# Patient Record
Sex: FEMALE | Race: WHITE | NOT HISPANIC OR LATINO | Employment: FULL TIME | URBAN - METROPOLITAN AREA
[De-identification: names, ages, dates, MRNs, and addresses within clinical notes are randomized per-mention and may not be internally consistent; named-entity substitution may affect disease eponyms.]

---

## 2017-02-27 ENCOUNTER — OFFICE VISIT (OUTPATIENT)
Dept: URGENT CARE | Facility: MEDICAL CENTER | Age: 42
End: 2017-02-27
Payer: COMMERCIAL

## 2017-02-27 PROCEDURE — 99203 OFFICE O/P NEW LOW 30 MIN: CPT

## 2017-11-21 ENCOUNTER — OFFICE VISIT (OUTPATIENT)
Dept: URGENT CARE | Facility: MEDICAL CENTER | Age: 42
End: 2017-11-21
Payer: COMMERCIAL

## 2017-11-21 PROCEDURE — 99213 OFFICE O/P EST LOW 20 MIN: CPT

## 2017-11-23 NOTE — PROGRESS NOTES
Assessment    1  Low back pain (724 2) (M54 5)    Plan  Low back pain    · Medrol 4 MG Oral Tablet Therapy Pack (MethylPREDNISolone); TAKE ASDIRECTED ON PACKAGE   · Methocarbamol 500 MG Oral Tablet; Take 1 tablet 4 times daily  Continue ice over the back  She can also use ibuprofen or naproxen after the Medrol Dosepak  I gave her handout on back exercises and discussed that she may benefit from physical therapy as her range of motion is full and she has no nerve signs  Chief Complaint    1  Back Pain  Chief Complaint Free Text Note Form: Known history of spinal stenosis and lumbar disc herniation, pain began yesterday      History of Present Illness  HPI: 51-year-old female complains of low back pain for 2 days  She states she got out of the car and felt pain in her back right in the center  She she denies numbness or tingling  She has some pain radiating to her left lateral thigh  Has had a history of back pain and stenosis before  She was referred to pain management but did not go back  She was referred to physical therapy but did not do it due to her work schedule  Hospital Based Practices Required Assessment:  Pain Assessment  the patient states they have pain  The pain is located in the low back  (on a scale of 0 to 10, the patient rates the pain at 8 )       Active Problems  1  Age 35+ During Pregnancy   2  Ankle pain, left (719 47) (M25 572)   3  Bulging lumbar disc (722 10) (M51 26)   4  Complete spontaneous  without mention of complication (310 08) (M51 4)   5  Contusion of leg, right (924 5) (S80 11XA)   6  Facet arthropathy, lumbosacral (721 3) (M12 88)   7  GERD (gastroesophageal reflux disease) (530 81) (K21 9)   8  Hypoglycemia (251 2) (E16 2)   9  Low back pain (724 2) (M54 5)   10  Lumbar degenerative disc disease (722 52) (M51 36)   11  Lumbar radiculopathy (724 4) (M54 16)   12  Lumbar spinal stenosis (724 02) (M48 061)   13  Normal pregnancy (V22 2) (Z34 90)   14   Oral contraceptive prescribed (V25 01) (Z30 011)   15  Spontaneous , incomplete (634 91) (O03 4)    Past Medical History  1  History of Depression (311) (F32 9)   2  History of Diabetes Mellitus (250 00)   3  History of esophageal reflux (V12 79) (Z87 19)   4  History of hypertension (V12 59) (Z86 79)   5  History of urinary tract infection (V13 02) (Z87 440)   6  Oral contraceptive prescribed (V25 01) (Z30 011)   7  History of Pancreatic B Islet Cell Hyperplasia (251 1)   8  History of Previously Pregnant Including 1  Miscarriage(S)   9  History of Strain of ankle, left (845 00) (S96 912A)   10  History of Summary Of Previous Pregnancies  1  (Total No )    Family History  Mother    1  Family history of Diabetes Mellitus (V18 0)   2  Family history of Hypertension (V17 49)  Father    3  Family history of Hypertension (V17 49)  Maternal Grandmother    4  Family history of Diabetes Mellitus (V18 0)  Paternal Grandmother    11  Family history of Breast Cancer (V16 3)  Paternal Aunt    10  Family history of Breast Cancer (V16 3)  Family History    7  Family history of Neuropathy, generalized    Social History   · Denied: History of Alcohol Use (History)   · Daily caffeine consumption, 1 serving a day   · Denied: History of Former smoker   · Never A Smoker   · No drug use   · Social alcohol use (Z78 9)    Surgical History    1  History of Cholecystectomy   2  History of Gastric Surgery For Morbid Obesity   3  History of Wrist Surgery    Current Meds   1  DULoxetine HCl - 60 MG Oral Capsule Delayed Release Particles; TAKE 1 CAPSULE DAILY; Therapy: [id number] to Recorded   2  Famotidine 40 MG Oral Tablet; TAKE 1 TABLET DAILY; Therapy: [id number] to Recorded   3  Ferrous Sulfate 325 (65 Fe) MG Oral Tablet; TAKE 1 TABLET TWICE DAILY; Therapy: (Recorded:2015) to Recorded   4  Multivitamins TABS; TAKE 1 TABLET DAILY; Therapy: (Recorded:2015) to Recorded   5   Protonix 40 MG Oral Tablet Delayed Release; take 1 tab daily; Therapy: (Recorded:15Jan2015) to Recorded   6  Vitamin B Complex Oral Tablet; TAKE 1 TABLET DAILY; Therapy: (Recorded:15Jan2015) to Recorded   7  Vitamin C 1000 MG Oral Tablet; TAKE 1 TABLET DAILY; Therapy: (Recorded:15Jan2015) to Recorded    Allergies    1  No Known Drug Allergies    Vitals  Signs   Recorded: 21Nov2017 05:34PM   Temperature: 98 4 F  Heart Rate: 111  Respiration: 18  Systolic: 227  Diastolic: 74  Weight: 305 lb   BMI Calculated: 39 53  BSA Calculated: 2 28  O2 Saturation: 100  Pain Scale: 8    Physical Exam   Constitutional  General appearance: No acute distress, well appearing and well nourished  Musculoskeletal  Inspection/palpation of joints, bones, and muscles: Abnormal  -- Lumbar spine tender over the spinous processes and bilateral paravertebral muscles  She has some spasm along the bilateral erectors  She is a full back range of motion but painful extension from bend over  She has no weakness with lower extremity range of motion of the hips knees and ankles  Negative straight leg raise bilaterally  Sensation intact bilateral lower extremities        Signatures   Electronically signed by : Luis Manuel Albrecht, [facility]; Nov 21 2017  6:24PM EST                       (Author)    Electronically signed by : KRYSTINA Amso ; Nov 22 2017  9:22AM EST                       (Co-author)

## 2018-01-10 NOTE — MISCELLANEOUS
Message   Recorded as Task   Date: 2016 09:07 AM, Created By: Denise Ambrocio   Task Name: Call Back   Assigned To: Donna Harp   Regarding Patient: Demetrio Martinez, Status: Active   Comment:    Ting Chavez - 2016 9:07 AM     TASK CREATED  Caller: Self; [phone] (Home)  pt called - she said her period has been off  She has been cramping and discharge  please advise  [phone]   Maddie Ruiz - 2016 9:57 AM     TASK EDITED  pt states this is the first episode she has had a 37 day cycle    disch is intermittent with no odor, no c/o redness or irritation    she will monitor and cb if smpt persist or increase     has yrly sched  2016        Active Problems    1  Age 35+ During Pregnancy   2  Ankle pain, left (719 47) (M25 572)   3  Bulging lumbar disc (722 10) (M51 26)   4  Complete spontaneous  without mention of complication (549 15) (W79 0)   5  Facet arthropathy, lumbosacral (721 3) (M46 97)   6  Hypoglycemia (251 2) (E16 2)   7  Low back pain (724 2) (M54 5)   8  Lumbar degenerative disc disease (722 52) (M51 36)   9  Lumbar radiculopathy (724 4) (M54 16)   10  Lumbar spinal stenosis (724 02) (M48 06)   11  Normal pregnancy (V22 2) (Z34 90)   12  Oral contraceptive prescribed (V25 01) (Z30 011)   13  Spontaneous , incomplete (634 91) (O03 4)   14  Urinary tract infection (599 0) (N39 0)    Current Meds   1  DULoxetine HCl - 60 MG Oral Capsule Delayed Release Particles; TAKE 1 CAPSULE   DAILY; Therapy: [id number] to Recorded   2  Ferrous Sulfate 325 (65 Fe) MG Oral Tablet; TAKE 1 TABLET TWICE DAILY; Therapy: (Recorded:2015) to Recorded   3  Multivitamins TABS; TAKE 1 TABLET DAILY; Therapy: (Recorded:2015) to Recorded   4  Protonix 40 MG Oral Tablet Delayed Release (Pantoprazole Sodium); take 1 tab daily; Therapy: (Recorded:2015) to Recorded   5  Vitamin B Complex Oral Tablet; TAKE 1 TABLET DAILY;    Therapy: (Recorded:2015) to Recorded 6  Vitamin C 1000 MG Oral Tablet; TAKE 1 TABLET DAILY; Therapy: (Recorded:15Jan2015) to Recorded   7  ZyrTEC Allergy 10 MG Oral Capsule; take 1 capsule daily; Therapy: (Recorded:15Jan2015) to Recorded    Allergies    1   No Known Drug Allergies    Signatures   Electronically signed by : Chauncey Booth, ; Apr 1 2016  9:58AM EST                       (Author)

## 2018-01-12 NOTE — PROGRESS NOTES
Assessment   1  Contusion of leg, right (924 5) (S80 11XA)    Discussion/Summary   Discussion Summary:    Motrin as directed  as directed     Medication Side Effects Reviewed: Possible side effects of new medications were reviewed with the patient/guardian today  Understands and agrees with treatment plan: The treatment plan was reviewed with the patient/guardian  The patient/guardian understands and agrees with the treatment plan    Counseling Documentation With Imm: The patient was counseled regarding diagnostic results,-- instructions for management,-- risk factor reductions,-- prognosis,-- patient and family education,-- impressions,-- risks and benefits of treatment options,-- importance of compliance with treatment  Follow Up Instructions: Follow Up with your Primary Care Provider in 1-2 days  If your symptoms worsen, go to the nearest [place] [address] Emergency Department  Chief Complaint   Chief Complaint Free Text Note Form: 2 to 3 weeks ago tripped over dog gate had bruises and lumps on R calf with pain      History of Present Illness   HPI: This is a 61-year-old female complaining of bruise right calf times 2-3 weeks  Patient reports she tripped over a dog gate and fell  Patient is concerned his daughter still Edema from the fall  Patient denies any numbness, tingling, loss of sensation  Patient reports pain to deep palpation  Hospital Based Practices Required Assessment:      Pain Assessment      the patient states they have pain  The pain is located in the R leg  (on a scale of 0 to 10, the patient rates the pain at 6 )      Abuse And Domestic Violence Screen       Yes, the patient is safe at home  -- The patient states no one is hurting them  Depression And Suicide Screen  No, the patient has not had thoughts of hurting themself  No, the patient has not felt depressed in the past 7 days  Prefered Language is  english  Primary Language is  english        Readiness To Learn: Receptive  Barriers To Learning: none  Preferred Learning: verbal      Review of Systems   Focused-Female:      Constitutional: No fever, no chills, feels well, no tiredness, no recent weight gain or loss  ENT: no ear ache, no loss of hearing, no nosebleeds or nasal discharge, no sore throat or hoarseness  Cardiovascular: no complaints of slow or fast heart rate, no chest pain, no palpitations, no leg claudication or lower extremity edema  Respiratory: no complaints of shortness of breath, no wheezing, no dyspnea on exertion, no orthopnea or PND  Breasts: no complaints of breast pain, breast lump or nipple discharge  Gastrointestinal: no complaints of abdominal pain, no constipation, no nausea or diarrhea, no vomiting, no bloody stools  Genitourinary: no complaints of dysuria, no incontinence, no pelvic pain, no dysmenorrhea, no vaginal discharge or abnormal vaginal bleeding  Musculoskeletal: no complaints of arthralgia, no myalgia, no joint swelling or stiffness, no limb pain or swelling-- and-- as noted in HPI  Integumentary: no complaints of skin rash or lesion, no itching or dry skin, no skin wounds  Neurological: no complaints of headache, no confusion, no numbness or tingling, no dizziness or fainting  Active Problems    1  Age 35+ During Pregnancy   2  Ankle pain, left (719 47) (M25 572)   3  Bulging lumbar disc (722 10) (M51 26)   4  Complete spontaneous  without mention of complication (138 71) (B75 9)   5  Facet arthropathy, lumbosacral (721 3) (M46 97)   6  Hypoglycemia (251 2) (E16 2)   7  Lumbar degenerative disc disease (722 52) (M51 36)   8  Lumbar radiculopathy (724 4) (M54 16)   9  Lumbar spinal stenosis (724 02) (M48 061)   10  Normal pregnancy (V22 2) (Z34 90)   11  Oral contraceptive prescribed (V25 01) (Z30 011)   12   Spontaneous , incomplete (634 91) (O03 4)      Urinary tract infection (599 0) (N39 0) Low back pain (724 2) (M54 5)               Past Medical History   1  History of Depression (311) (F32 9)   2  History of Diabetes Mellitus (250 00)   3  History of esophageal reflux (V12 79) (Z87 19)   4  History of hypertension (V12 59) (Z86 79)   5  Oral contraceptive prescribed (V25 01) (Z30 011)   6  History of Pancreatic B Islet Cell Hyperplasia (251 1)   7  History of Previously Pregnant Including 1  Miscarriage(S)   8  History of Strain of ankle, left (845 00) (S96 912A)   9  History of Summary Of Previous Pregnancies  1  (Total No )  Active Problems And Past Medical History Reviewed: The active problems and past medical history were reviewed and updated today  Family History   Mother    1  Family history of Diabetes Mellitus (V18 0)   2  Family history of Hypertension (V17 49)  Father    3  Family history of Hypertension (V17 49)  Maternal Grandmother    4  Family history of Diabetes Mellitus (V18 0)  Paternal Grandmother    11  Family history of Breast Cancer (V16 3)  Paternal Aunt    10  Family history of Breast Cancer (V16 3)  Family History    7  Family history of Neuropathy, generalized  Family History Reviewed: The family history was reviewed and updated today  Social History    · Denied: History of Alcohol Use (History)   · Daily caffeine consumption, 1 serving a day   · Denied: History of Former smoker   · Never A Smoker   · No drug use   · Social alcohol use (Z78 9)  Social History Reviewed: The social history was reviewed and updated today  Surgical History   1  History of Cholecystectomy   2  History of Gastric Surgery For Morbid Obesity   3  History of Wrist Surgery  Surgical History Reviewed: The surgical history was reviewed and updated today  Current Meds    1  DULoxetine HCl - 60 MG Oral Capsule Delayed Release Particles; TAKE 1 CAPSULE     DAILY; Therapy: [id number] to Recorded   2  Famotidine 40 MG Oral Tablet; TAKE 1 TABLET DAILY;      Therapy: [id number] to Recorded   3  Ferrous Sulfate 325 (65 Fe) MG Oral Tablet; TAKE 1 TABLET TWICE DAILY; Therapy: (Recorded:15Jan2015) to Recorded   4  Multivitamins TABS; TAKE 1 TABLET DAILY; Therapy: (Recorded:15Jan2015) to Recorded   5  Protonix 40 MG Oral Tablet Delayed Release; take 1 tab daily; Therapy: (Recorded:15Jan2015) to Recorded   6  Vitamin B Complex Oral Tablet; TAKE 1 TABLET DAILY; Therapy: (Recorded:15Jan2015) to Recorded   7  Vitamin C 1000 MG Oral Tablet; TAKE 1 TABLET DAILY; Therapy: (Recorded:15Jan2015) to Recorded   8  ZyrTEC Allergy 10 MG Oral Capsule; take 1 capsule daily; Therapy: (Recorded:15Jan2015) to Recorded  Medication List Reviewed: The medication list was reviewed and updated today  Allergies   1  No Known Drug Allergies    Vitals   Signs   Recorded: [id number] 04:50PM   Heart Rate: 76  Respiration: 16  Systolic: 876  Diastolic: 84  Weight: 484 lb   BMI Calculated: 39 38  BSA Calculated: 2 28  Pain Scale: 6    Physical Exam        Constitutional      General appearance: No acute distress, well appearing and well nourished  Musculoskeletal      Gait and station: Normal        Digits and nails: Normal without clubbing or cyanosis  Inspection/palpation of joints, bones, and muscles: Abnormal  -- right calf, full range of motion, +5 strength, contusion distal aspect of calf with ecchymosis, edema, negative Homans sign        Signatures    Electronically signed by : DREAD Powell; Hany 10 2018  8:20AM EST                       (Author)     Electronically signed by : KRYSTINA Salgado ; Hany 10 2018 10:48AM EST                       (Co-author)

## 2018-05-23 ENCOUNTER — APPOINTMENT (EMERGENCY)
Dept: RADIOLOGY | Facility: HOSPITAL | Age: 43
End: 2018-05-23
Payer: COMMERCIAL

## 2018-05-23 ENCOUNTER — HOSPITAL ENCOUNTER (EMERGENCY)
Facility: HOSPITAL | Age: 43
Discharge: HOME | End: 2018-05-23
Attending: EMERGENCY MEDICINE
Payer: COMMERCIAL

## 2018-05-23 VITALS
SYSTOLIC BLOOD PRESSURE: 159 MMHG | WEIGHT: 246.91 LBS | BODY MASS INDEX: 37.54 KG/M2 | OXYGEN SATURATION: 96 % | HEART RATE: 90 BPM | RESPIRATION RATE: 20 BRPM | TEMPERATURE: 98 F | DIASTOLIC BLOOD PRESSURE: 93 MMHG

## 2018-05-23 DIAGNOSIS — R07.89 CHEST DISCOMFORT: Primary | ICD-10-CM

## 2018-05-23 DIAGNOSIS — R06.00 DYSPNEA: ICD-10-CM

## 2018-05-23 LAB
DEPRECATED D DIMER PPP: 413 NG/ML (FEU) (ref 0–424)
NT-PROBNP SERPL-MCNC: 34 PG/ML
TROPONIN I SERPL-MCNC: <0.02 NG/ML

## 2018-05-23 PROCEDURE — 85379 FIBRIN DEGRADATION QUANT: CPT | Performed by: EMERGENCY MEDICINE

## 2018-05-23 PROCEDURE — 99285 EMERGENCY DEPT VISIT HI MDM: CPT

## 2018-05-23 PROCEDURE — 71046 X-RAY EXAM CHEST 2 VIEWS: CPT

## 2018-05-23 PROCEDURE — 36415 COLL VENOUS BLD VENIPUNCTURE: CPT | Performed by: EMERGENCY MEDICINE

## 2018-05-23 PROCEDURE — 83880 ASSAY OF NATRIURETIC PEPTIDE: CPT | Performed by: EMERGENCY MEDICINE

## 2018-05-23 PROCEDURE — 93005 ELECTROCARDIOGRAM TRACING: CPT

## 2018-05-23 PROCEDURE — 84484 ASSAY OF TROPONIN QUANT: CPT | Performed by: EMERGENCY MEDICINE

## 2018-05-23 NOTE — DISCHARGE INSTRUCTIONS
Chest Pain   WHAT YOU NEED TO KNOW:   Chest pain can be caused by a range of conditions, from not serious to life-threatening  Chest pain can be a symptom of a digestive problem, such as acid reflux or a stomach ulcer  An anxiety attack or a strong emotion, such as anger, can also cause chest pain  Infection, inflammation, or a fracture in the bones or cartilage in your chest can cause pain or discomfort  Sometimes chest pain or pressure is caused by poor blood flow to your heart (angina)  Chest pain may also be caused by life-threatening conditions such as a heart attack or blood clot in your lungs  DISCHARGE INSTRUCTIONS:   Call 911 if:   · You have any of the following signs of a heart attack:      ¨ Squeezing, pressure, or pain in your chest that lasts longer than 5 minutes or returns    ¨ Discomfort or pain in your back, neck, jaw, stomach, or arm     ¨ Trouble breathing    ¨ Nausea or vomiting    ¨ Lightheadedness or a sudden cold sweat, especially with chest pain or trouble breathing    Seek care immediately if:   · You have chest discomfort that gets worse, even with medicine  · You cough or vomit blood  · Your bowel movements are black or bloody  · You cannot stop vomiting, or it hurts to swallow  Contact your healthcare provider if:   · You have questions or concerns about your condition or care  Medicines:   · Medicines  may be given to treat the cause of your chest pain  Examples include pain medicine, anxiety medicine, or medicines to increase blood flow to your heart  · Do not take certain medicines without asking your healthcare provider first   These include NSAIDs, herbal or vitamin supplements, or hormones (estrogen or progestin)  · Take your medicine as directed  Contact your healthcare provider if you think your medicine is not helping or if you have side effects  Tell him or her if you are allergic to any medicine   Keep a list of the medicines, vitamins, and herbs you take  Include the amounts, and when and why you take them  Bring the list or the pill bottles to follow-up visits  Carry your medicine list with you in case of an emergency  Follow up with your healthcare provider within 72 hours, or as directed: You may need to return for more tests to find the cause of your chest pain  You may be referred to a specialist, such as a cardiologist or gastroenterologist  Write down your questions so you remember to ask them during your visits  Healthy living tips: The following are general healthy guidelines  If your chest pain is caused by a heart problem, your healthcare provider will give you specific guidelines to follow  · Do not smoke  Nicotine and other chemicals in cigarettes and cigars can cause lung and heart damage  Ask your healthcare provider for information if you currently smoke and need help to quit  E-cigarettes or smokeless tobacco still contain nicotine  Talk to your healthcare provider before you use these products  · Eat a variety of healthy, low-fat foods  Healthy foods include fruits, vegetables, whole-grain breads, low-fat dairy products, beans, lean meats, and fish  Ask for more information about a heart healthy diet  · Ask about activity  Your healthcare provider will tell you which activities to limit or avoid  Ask when you can drive, return to work, and have sex  Ask about the best exercise plan for you  · Maintain a healthy weight  Ask your healthcare provider how much you should weigh  Ask him or her to help you create a weight loss plan if you are overweight  © 2017 [address] [employer] Information is for End User's use only and may not be sold, redistributed or otherwise used for commercial purposes  All illustrations and images included in CareNotes® are the copyrighted property of [employer]  or [employer]  The above information is an  only   It is not intended as medical advice for individual conditions or treatments  Talk to your doctor, nurse or pharmacist before following any medical regimen to see if it is safe and effective for you  Dyspnea   WHAT YOU NEED TO KNOW:   Dyspnea is breathing difficulty or discomfort  You may have labored, painful, or shallow breathing  You may feel breathless or short of breath  Dyspnea can occur during rest or with activity  You may have dyspnea for a short time, or it might become chronic  Dyspnea is often a symptom of a disease or condition  DISCHARGE INSTRUCTIONS:   Seek care immediately if:   · Your signs and symptoms are the same or worse within 24 hours of treatment  · You have shaking chills or a fever over 102°F      · You have new pain, pressure, or tightness in your chest      · You have a new or worse cough or wheezing, or you cough up blood  · You feel like you cannot get enough air  · The skin over your ribs or on your neck sinks in when you breathe  · You have a severe headache with vomiting and abdominal pain  · You feel confused or dizzy  Contact your healthcare provider or specialist if:   · You have questions or concerns about your condition or care  Medicines:   · Medicines  may be used to treat the cause of your dyspnea  Medicines may reduce swelling in your airway or decrease extra fluid from around your heart or lungs  Other medicines may be used to decrease anxiety and help you feel calm and relaxed  · Take your medicine as directed  Contact your healthcare provider if you think your medicine is not helping or if you have side effects  Tell him or her if you are allergic to any medicine  Keep a list of the medicines, vitamins, and herbs you take  Include the amounts, and when and why you take them  Bring the list or the pill bottles to follow-up visits  Carry your medicine list with you in case of an emergency  Manage long-term dyspnea:   · Create an action plan    You and your healthcare provider can work together to create a plan for how to handle episodes of dyspnea  The plan can include daily activities, treatment changes, and what to do if you have severe breathing problems  · Lean forward on your elbows when you sit  This helps your lungs expand and may make it easier to breathe  · Use pursed-lip breathing any time you feel short of breath  Breathe in through your nose and then slowly breathe out through your mouth with your lips slightly puckered  It should take you twice as long to breathe out as it did to breathe in  · Do not smoke  Nicotine and other chemicals in cigarettes and cigars can cause lung damage and make it harder to breathe  Ask your healthcare provider for information if you currently smoke and need help to quit  E-cigarettes or smokeless tobacco still contain nicotine  Talk to your healthcare provider before you use these products  · Reach or maintain a healthy weight  Your healthcare provider can help you create a safe weight loss plan if you are overweight  · Exercise as directed  Exercise can help your lungs work more easily  Exercise can also help you lose weight if needed  Try to get at least 30 minutes of exercise most days of the week  Your healthcare provider can help you create an exercise plan that is safe for you  Follow up with your healthcare provider or specialist as directed:  Write down your questions so you remember to ask them during your visits  © 2017 [employer] Information is for End User's use only and may not be sold, redistributed or otherwise used for commercial purposes  All illustrations and images included in CareNotes® are the copyrighted property of [employer]  or Isra Daniels  The above information is an  only  It is not intended as medical advice for individual conditions or treatments   Talk to your doctor, nurse or pharmacist before following any medical regimen to see if it is safe and effective for you

## 2018-05-23 NOTE — ED PROVIDER NOTES
History  Chief Complaint   Patient presents with    Shortness of Breath     Pt started with bilat LE edema, now with SOB and some CP  59-year-old female presents to the emergency department relating that "a month ago began having swelling my arms and legs "  She relates that the swelling would be a bit worse at the end of each day  She relates that she saw her primary care physician on Friday and had blood work which was "okay "  She relates that the only thing abnormal on this was that her red blood cells per high and that a couple of numbers on the CBC were off  She relates that she did have thyroid testing,Lyme testing and rheumatoid panel which were unremarkable  She relates that on Monday afternoon) 2 days ago) she began noticing some shortness of breath  Yesterday she began experiencing some chest pressure  Episodes of this were intermittent that they have become more frequent  The dyspnea has been constant  She notices that the symptoms are worse with activity  She relates that she felt a little bit weird when she was laying flat  She has had a cough over this time though attributes this to allergies  She has noticed some fullness in her ears and nasal congestion  Cough is occasionally productive of white-yellow sputum  She has not appreciated any hemoptysis  She has noted occasional temperature elevation up to 99  Patient denies having felt particularly anxious  She notes that she did contact her PCP hoping to obtain a chest x-ray  She relates that she was directed to the ED for further evaluation  Past medical history significant for GERD and spinal stenosis  She relates that she did have gastric bypass surgery in 8167 and some complications afterwards  She has had anemia and required use of oral iron  She has never required transfusion and on most recent testing was not anemic  No personal history of cardiac, pulmonary or coagulopathy problems    Family history only of hypertension  Patient denies any recent immobilization  She is not on any hormonal medications  5/19 labs reviewed            None       Past Medical History:   Diagnosis Date    GERD (gastroesophageal reflux disease)     Spinal stenosis        Past Surgical History:   Procedure Laterality Date    GASTRIC BYPASS         History reviewed  No pertinent family history  I have reviewed and agree with the history as documented  Social History   Substance Use Topics    Smoking status: Never Smoker    Smokeless tobacco: Never Used    Alcohol use No        Review of Systems   Cardiovascular: Positive for leg swelling (Without discomfort)  All other systems reviewed and are negative  Physical Exam  Physical Exam   Constitutional: She is oriented to person, place, and time  She appears well-developed and well-nourished  HENT:   Head: Normocephalic  Eyes: Conjunctivae and EOM are normal    Cardiovascular: Normal rate and regular rhythm  Pulmonary/Chest: Effort normal and breath sounds normal    Musculoskeletal: Normal range of motion  She exhibits no edema or tenderness  Neurological: She is alert and oriented to person, place, and time  Skin: Skin is warm and dry  Psychiatric: She has a normal mood and affect  Her behavior is normal    Nursing note and vitals reviewed        Vital Signs  ED Triage Vitals [05/23/18 1448]   Temperature Pulse Respirations Blood Pressure SpO2   98 °F (36 7 °C) 103 20 147/71 98 %      Temp Source Heart Rate Source Patient Position - Orthostatic VS BP Location FiO2 (%)   Oral Monitor Sitting Right arm --      Pain Score       7           Vitals:    05/23/18 1448 05/23/18 1530 05/23/18 1615   BP: 147/71 159/93    Pulse: 103 88 90   Patient Position - Orthostatic VS: Sitting Sitting        Visual Acuity      ED Medications  Medications - No data to display    Diagnostic Studies  Results Reviewed     Procedure Component Value Units Date/Time    D-Dimer [[id number]] (Normal) Collected:  05/23/18 1518    Lab Status:  Final result Specimen:  Blood from Arm, Right Updated:  05/23/18 1553     D-Dimer, Quant 413 ng/ml (FEU)     B-type natriuretic peptide [[id number]]  (Normal) Collected:  05/23/18 1518    Lab Status:  Final result Specimen:  Blood from Arm, Right Updated:  05/23/18 1548     NT-proBNP 34 pg/mL     Troponin I [[id number]]  (Normal) Collected:  05/23/18 1518    Lab Status:  Final result Specimen:  Blood from Arm, Right Updated:  05/23/18 1547     Troponin I <0 02 ng/mL     Narrative:         [employer] Chemistry analyzer 99% cutoff is > 0 04 ng/mL in network labs    o cTnI 99% cutoff is useful only when applied to patients in the clinical setting of myocardial ischemia  o cTnI 99% cutoff should be interpreted in the context of clinical history, ECG findings and possibly cardiac imaging to establish correct diagnosis  o cTnI 99% cutoff may be suggestive but clearly not indicative of a coronary event without the clinical setting of myocardial ischemia  XR chest 2 views   ED Interpretation by Aris Silva MD (05/23 1546)   Unremarkable cardiac silhouette  Clear lungs  Final Result by John Rizzo MD (05/23 1548)      No acute cardiopulmonary disease              Workstation performed: [id number]                    Procedures  ECG 12 Lead Documentation  Date/Time: 5/23/2018 3:18 PM  Performed by: Yaima Barba  Authorized by: Yaima Barba     ECG reviewed by me, the ED Provider: yes    Patient location:  ED and bedside  Previous ECG:     Previous ECG:  Unavailable  Rate:     ECG rate:  95    ECG rate assessment: normal    Rhythm:     Rhythm: sinus rhythm    Ectopy:     Ectopy: none    QRS:     QRS axis:  Normal  Conduction:     Conduction: normal    ST segments:     ST segments:  Normal  T waves:     T waves: normal             Phone Contacts  ED Phone Contact    ED Course MDM  Number of Diagnoses or Management Options  Chest discomfort:   Dyspnea:     CritCare Time    Disposition  Final diagnoses:   Chest discomfort   Dyspnea     Time reflects when diagnosis was documented in both MDM as applicable and the Disposition within this note     Time User Action Codes Description Comment    5/23/2018  4:15 PM Namita Capone A Add [R07 89] Chest discomfort     5/23/2018  4:15 PM Namita Capone A Add [R06 00] Dyspnea       ED Disposition     ED Disposition Condition Comment    Discharge  Carlin Essex discharge to home/self care  Condition at discharge: Good        Follow-up Information     Follow up With Specialties Details Why [address], [address] Kia Gonzalez Nurse Practitioner Schedule an appointment as soon as possible for a visit  [address] [place] [address]  [phone]            There are no discharge medications for this patient  No discharge procedures on file      ED Provider  Electronically Signed by           Genia Correa MD  05/23/18 Xu Gabriel

## 2018-05-24 LAB
ATRIAL RATE: 95 BPM
P AXIS: 58 DEGREES
PR INTERVAL: 144 MS
QRS AXIS: 66 DEGREES
QRSD INTERVAL: 78 MS
QT INTERVAL: 330 MS
QTC INTERVAL: 414 MS
T WAVE AXIS: 47 DEGREES
VENTRICULAR RATE: 95 BPM

## 2018-05-24 PROCEDURE — 93010 ELECTROCARDIOGRAM REPORT: CPT | Performed by: INTERNAL MEDICINE

## 2018-09-20 ENCOUNTER — OFFICE VISIT (OUTPATIENT)
Dept: URGENT CARE | Facility: CLINIC | Age: 43
End: 2018-09-20
Payer: COMMERCIAL

## 2018-09-20 VITALS
OXYGEN SATURATION: 97 % | HEART RATE: 103 BPM | HEIGHT: 67 IN | SYSTOLIC BLOOD PRESSURE: 118 MMHG | WEIGHT: 258 LBS | DIASTOLIC BLOOD PRESSURE: 70 MMHG | RESPIRATION RATE: 18 BRPM | TEMPERATURE: 98.8 F | BODY MASS INDEX: 40.49 KG/M2

## 2018-09-20 DIAGNOSIS — J20.9 ACUTE BRONCHITIS, UNSPECIFIED ORGANISM: Primary | ICD-10-CM

## 2018-09-20 PROCEDURE — 99213 OFFICE O/P EST LOW 20 MIN: CPT | Performed by: PHYSICIAN ASSISTANT

## 2018-09-20 RX ORDER — CETIRIZINE HYDROCHLORIDE 10 MG/1
TABLET ORAL
COMMUNITY

## 2018-09-20 RX ORDER — ONDANSETRON HYDROCHLORIDE 8 MG/1
TABLET, FILM COATED ORAL
COMMUNITY
End: 2021-09-15 | Stop reason: SDUPTHER

## 2018-09-20 RX ORDER — BUSPIRONE HYDROCHLORIDE 10 MG/1
10 TABLET ORAL 2 TIMES DAILY
COMMUNITY
Start: 2018-06-08 | End: 2020-06-01

## 2018-09-20 RX ORDER — FAMOTIDINE 40 MG/1
40 TABLET, FILM COATED ORAL 2 TIMES DAILY
COMMUNITY
End: 2020-06-01

## 2018-09-20 RX ORDER — BENZONATATE 100 MG/1
100 CAPSULE ORAL 3 TIMES DAILY PRN
Qty: 20 CAPSULE | Refills: 0 | Status: SHIPPED | OUTPATIENT
Start: 2018-09-20 | End: 2020-06-01

## 2018-09-20 RX ORDER — DULOXETIN HYDROCHLORIDE 60 MG/1
60 CAPSULE, DELAYED RELEASE ORAL
COMMUNITY
Start: 2017-12-20 | End: 2020-07-27 | Stop reason: SDUPTHER

## 2018-09-20 RX ORDER — AZITHROMYCIN 250 MG/1
TABLET, FILM COATED ORAL
Qty: 6 TABLET | Refills: 0 | Status: SHIPPED | OUTPATIENT
Start: 2018-09-20 | End: 2018-09-24

## 2018-09-20 NOTE — PROGRESS NOTES
[facility]        NAME: Kang Michel is a 43 y o  female  : 1975    MRN: [id number]  DATE: 2018  TIME: 4:55 PM    Assessment and Plan   Acute bronchitis, unspecified organism [J20 9]  1  Acute bronchitis, unspecified organism  azithromycin (ZITHROMAX) 250 mg tablet    benzonatate (TESSALON PERLES) 100 mg capsule         Patient Instructions     1  Bronchitis  -Take antibiotic as directed with food  -Take tessalon perles as directed  -Tylenol/Motrin  -Increase fluids  -Follow-up with PCP within 3-5 days    Go to ER with worsening symptoms, fever, chest pain, shortness of breath, or any signs of distress    Chief Complaint     Chief Complaint   Patient presents with    Cold Like Symptoms     taking sudafed/cough med   Cough     x 7 days, chest congestion    Headache     sinus headache, head congested    Nasal Congestion     sinus pressure         History of Present Illness       Patient is a 17-year-old female who presents today for evaluation of a cough and cold symptoms that has been present for the past 7 days  Patient feels that her symptoms now moved down into her chest and she is having some chest congestion  Patient admits to nasal congestion and headache as well  Patient states that she tried taking Sudafed and guaifenesin without any relief  Patient denies any sick contacts  Review of Systems   Review of Systems   Constitutional: Negative for chills and fever  HENT: Positive for rhinorrhea  Negative for ear pain and sore throat  Respiratory: Positive for cough  Negative for shortness of breath  Cardiovascular: Negative for chest pain  Neurological: Positive for headaches           Current Medications       Current Outpatient Prescriptions:     cetirizine (ZyrTEC) 10 mg tablet, , Disp: , Rfl:     DULoxetine (CYMBALTA) 60 mg delayed release capsule, Take 60 mg by mouth, Disp: , Rfl:     famotidine (PEPCID) 40 MG tablet, Take 40 mg by mouth 2 (two) times a day, Disp: , Rfl:     azithromycin (ZITHROMAX) 250 mg tablet, Take 2 tablets today then 1 tablet daily x 4 days, Disp: 6 tablet, Rfl: 0    benzonatate (TESSALON PERLES) 100 mg capsule, Take 1 capsule (100 mg total) by mouth 3 (three) times a day as needed for cough, Disp: 20 capsule, Rfl: 0    busPIRone (BUSPAR) 10 mg tablet, Take 10 mg by mouth 2 (two) times a day, Disp: , Rfl:     ondansetron (ZOFRAN) 8 mg tablet, 1 tab(s), Disp: , Rfl:     Current Allergies     Allergies as of 09/20/2018 - Reviewed 09/20/2018   Allergen Reaction Noted    Pollen extract  09/29/2016            The following portions of the patient's history were reviewed and updated as appropriate: allergies, current medications, past family history, past medical history, past social history, past surgical history and problem list      Past Medical History:   Diagnosis Date    GERD (gastroesophageal reflux disease)     Spinal stenosis        Past Surgical History:   Procedure Laterality Date    GASTRIC BYPASS         Family History   Problem Relation Age of Onset    Diabetes Mother     Hypertension Mother     Diabetes Father     Hypertension Father     Heart disease Father          Medications have been verified  Objective   /70   Pulse 103   Temp 98 8 °F (37 1 °C) (Tympanic)   Resp 18   Ht 5' 7" (1 702 m)   Wt 117 kg (258 lb)   LMP 09/09/2018 (Approximate)   SpO2 97%   BMI 40 41 kg/m²        Physical Exam     Physical Exam   Constitutional: She is oriented to person, place, and time  She appears well-developed and well-nourished  No distress  HENT:   Right Ear: Tympanic membrane and external ear normal    Left Ear: Tympanic membrane and external ear normal    Nose: Nose normal  Right sinus exhibits no maxillary sinus tenderness and no frontal sinus tenderness  Left sinus exhibits no maxillary sinus tenderness and no frontal sinus tenderness     Mouth/Throat: Uvula is midline, oropharynx is clear and moist and mucous membranes are normal    Eyes: Conjunctivae and EOM are normal  Pupils are equal, round, and reactive to light  Neck: Normal range of motion  Neck supple  Cardiovascular: Normal rate, regular rhythm and normal heart sounds  Pulmonary/Chest: Effort normal and breath sounds normal  She has no wheezes  She has no rales  Lymphadenopathy:     She has no cervical adenopathy  Neurological: She is alert and oriented to person, place, and time  Skin: Skin is warm and dry  Psychiatric: She has a normal mood and affect  Nursing note and vitals reviewed

## 2018-09-20 NOTE — PATIENT INSTRUCTIONS
1  Bronchitis  -Take antibiotic as directed with food  -Take tessalon perles as directed  -Tylenol/Motrin  -Increase fluids  -Follow-up with PCP within 3-5 days    Go to ER with worsening symptoms, fever, chest pain, shortness of breath, or any signs of distress    Acute Bronchitis   AMBULATORY CARE:   Acute bronchitis  is swelling and irritation in the air passages of your lungs  This irritation may cause you to cough or have other breathing problems  Acute bronchitis often starts because of another illness, such as a cold or the flu  The illness spreads from your nose and throat to your windpipe and airways  Bronchitis is often called a chest cold  Acute bronchitis lasts about 3 to 6 weeks and is usually not a serious illness  Your cough can last for several weeks  You may have any of the following symptoms:   · A cough with sputum that may be clear, yellow, or green    · Feeling more tired than usual, and body aches    · A fever and chills    · Wheezing when you breathe    · A tight chest or pain when you breathe or cough  Seek care immediately if:   · You cough up blood  · Your lips or fingernails turn blue  · You feel like you are not getting enough air when you breathe  Contact your healthcare provider if:   · You have a fever  · Your breathing problems do not go away or get worse  · Your cough does not get better within 4 weeks  · You have questions or concerns about your condition or care  Self-care:   · Get more rest   Rest helps your body to heal  Slowly start to do more each day  Rest when you feel it is needed  · Avoid irritants in the air  Avoid chemicals, fumes, and dust  Wear a face mask if you must work around dust or fumes  Stay inside on days when air pollution levels are high  If you have allergies, stay inside when pollen counts are high  Do not use aerosol products, such as spray-on deodorant, bug spray, and hair spray  · Do not smoke or be around others who smoke  Nicotine and other chemicals in cigarettes and cigars damages the cilia that move mucus out of your lungs  Ask your healthcare provider for information if you currently smoke and need help to quit  E-cigarettes or smokeless tobacco still contain nicotine  Talk to your healthcare provider before you use these products  · Drink liquids as directed  Liquids help keep your air passages moist and help you cough up mucus  You may need to drink more liquids when you have acute bronchitis  Ask how much liquid to drink each day and which liquids are best for you  · Use a humidifier or vaporizer  Use a cool mist humidifier or a vaporizer to increase air moisture in your home  This may make it easier for you to breathe and help decrease your cough  Prevent acute bronchitis by doing the following:   · Get the vaccinations you need  Ask your healthcare provider if you should get vaccinated against the flu or pneumonia  · Prevent the spread of germs  You can decrease your risk of acute bronchitis and other illnesses by doing the following:     [facility] your hands often with soap and water  Carry germ-killing hand lotion or gel with you  You can use the lotion or gel to clean your hands when soap and water are not available  ¨ Do not touch your eyes, nose, or mouth unless you have washed your hands first     ¨ Always cover your mouth when you cough to prevent the spread of germs  It is best to cough into a tissue or your shirt sleeve instead of into your hand  Ask those around you cover their mouths when they cough  ¨ Try to avoid people who have a cold or the flu  If you are sick, stay away from others as much as possible  Medicines: Your healthcare provider may  give you any of the following:  · Ibuprofen or acetaminophen  are medicines that help lower your fever  They are available without a doctor's order  Ask your healthcare provider which medicine is right for you   Ask how much to take and how often to take it  Follow directions  These medicines can cause stomach bleeding if not taken correctly  Ibuprofen can cause kidney damage  Do not take ibuprofen if you have kidney disease, an ulcer, or allergies to aspirin  Acetaminophen can cause liver damage  Do not take more than 4,000 milligrams in 24 hours  · Decongestants  help loosen mucus in your lungs and make it easier to cough up  This can help you breathe easier  · Cough suppressants  decrease your urge to cough  If your cough produces mucus, do not take a cough suppressant unless your healthcare provider tells you to  Your healthcare provider may suggest that you take a cough suppressant at night so you can rest     · Inhalers  may be given  Your healthcare provider may give you one or more inhalers to help you breathe easier and cough less  An inhaler gives your medicine to open your airways  Ask your healthcare provider to show you how to use your inhaler correctly  Follow up with your healthcare provider as directed:  Write down questions you have so you will remember to ask them during your follow-up visits  © 2017 [facility] Information is for End User's use only and may not be sold, redistributed or otherwise used for commercial purposes  All illustrations and images included in CareNotes® are the copyrighted property of A D A M [employer] Inc  or Isra Daniels  The above information is an  only  It is not intended as medical advice for individual conditions or treatments  Talk to your doctor, nurse or pharmacist before following any medical regimen to see if it is safe and effective for you

## 2020-05-18 ENCOUNTER — OFFICE VISIT (OUTPATIENT)
Dept: URGENT CARE | Facility: CLINIC | Age: 45
End: 2020-05-18
Payer: COMMERCIAL

## 2020-05-18 VITALS
WEIGHT: 261.6 LBS | HEART RATE: 86 BPM | TEMPERATURE: 97.5 F | OXYGEN SATURATION: 99 % | SYSTOLIC BLOOD PRESSURE: 137 MMHG | RESPIRATION RATE: 18 BRPM | DIASTOLIC BLOOD PRESSURE: 71 MMHG | HEIGHT: 67 IN | BODY MASS INDEX: 41.06 KG/M2

## 2020-05-18 DIAGNOSIS — L03.113 CELLULITIS OF RIGHT UPPER EXTREMITY: Primary | ICD-10-CM

## 2020-05-18 PROCEDURE — G0382 LEV 3 HOSP TYPE B ED VISIT: HCPCS | Performed by: NURSE PRACTITIONER

## 2020-05-18 RX ORDER — PANTOPRAZOLE SODIUM 40 MG/1
40 TABLET, DELAYED RELEASE ORAL DAILY
COMMUNITY
End: 2020-10-06 | Stop reason: SDUPTHER

## 2020-05-18 RX ORDER — CEPHALEXIN 500 MG/1
500 CAPSULE ORAL EVERY 6 HOURS SCHEDULED
Qty: 20 CAPSULE | Refills: 0 | Status: SHIPPED | OUTPATIENT
Start: 2020-05-18 | End: 2020-05-23

## 2020-06-01 ENCOUNTER — OFFICE VISIT (OUTPATIENT)
Dept: FAMILY MEDICINE CLINIC | Facility: CLINIC | Age: 45
End: 2020-06-01
Payer: COMMERCIAL

## 2020-06-01 VITALS
TEMPERATURE: 97.6 F | OXYGEN SATURATION: 99 % | DIASTOLIC BLOOD PRESSURE: 72 MMHG | HEART RATE: 76 BPM | BODY MASS INDEX: 40.81 KG/M2 | SYSTOLIC BLOOD PRESSURE: 118 MMHG | HEIGHT: 67 IN | RESPIRATION RATE: 18 BRPM | WEIGHT: 260 LBS

## 2020-06-01 DIAGNOSIS — Z00.00 ANNUAL PHYSICAL EXAM: Primary | ICD-10-CM

## 2020-06-01 DIAGNOSIS — Z12.39 SCREENING FOR BREAST CANCER: ICD-10-CM

## 2020-06-01 DIAGNOSIS — K21.9 GASTROESOPHAGEAL REFLUX DISEASE, ESOPHAGITIS PRESENCE NOT SPECIFIED: ICD-10-CM

## 2020-06-01 DIAGNOSIS — E66.01 CLASS 3 SEVERE OBESITY DUE TO EXCESS CALORIES WITH SERIOUS COMORBIDITY AND BODY MASS INDEX (BMI) OF 40.0 TO 44.9 IN ADULT (HCC): ICD-10-CM

## 2020-06-01 DIAGNOSIS — M79.89 LEG SWELLING: ICD-10-CM

## 2020-06-01 DIAGNOSIS — I10 ESSENTIAL HYPERTENSION: ICD-10-CM

## 2020-06-01 DIAGNOSIS — M48.061 SPINAL STENOSIS OF LUMBAR REGION WITHOUT NEUROGENIC CLAUDICATION: ICD-10-CM

## 2020-06-01 DIAGNOSIS — Z98.84 HISTORY OF ROUX-EN-Y GASTRIC BYPASS: ICD-10-CM

## 2020-06-01 DIAGNOSIS — R73.03 PREDIABETES: ICD-10-CM

## 2020-06-01 DIAGNOSIS — R06.00 DYSPNEA ON EXERTION: ICD-10-CM

## 2020-06-01 PROBLEM — R06.09 DYSPNEA ON EXERTION: Status: ACTIVE | Noted: 2020-06-01

## 2020-06-01 PROBLEM — E66.813 CLASS 3 SEVERE OBESITY DUE TO EXCESS CALORIES WITH SERIOUS COMORBIDITY AND BODY MASS INDEX (BMI) OF 40.0 TO 44.9 IN ADULT (HCC): Status: ACTIVE | Noted: 2020-06-01

## 2020-06-01 PROCEDURE — 99386 PREV VISIT NEW AGE 40-64: CPT | Performed by: INTERNAL MEDICINE

## 2020-06-01 PROCEDURE — 3074F SYST BP LT 130 MM HG: CPT | Performed by: INTERNAL MEDICINE

## 2020-06-01 PROCEDURE — 3008F BODY MASS INDEX DOCD: CPT | Performed by: INTERNAL MEDICINE

## 2020-06-01 PROCEDURE — 3078F DIAST BP <80 MM HG: CPT | Performed by: INTERNAL MEDICINE

## 2020-06-01 PROCEDURE — 99215 OFFICE O/P EST HI 40 MIN: CPT | Performed by: INTERNAL MEDICINE

## 2020-06-01 PROCEDURE — 1036F TOBACCO NON-USER: CPT | Performed by: INTERNAL MEDICINE

## 2020-06-01 RX ORDER — METOPROLOL SUCCINATE 25 MG/1
25 TABLET, EXTENDED RELEASE ORAL DAILY
COMMUNITY
End: 2020-06-01 | Stop reason: SDUPTHER

## 2020-06-01 RX ORDER — METOPROLOL SUCCINATE 25 MG/1
25 TABLET, EXTENDED RELEASE ORAL DAILY
Qty: 90 TABLET | Refills: 1 | Status: SHIPPED | OUTPATIENT
Start: 2020-06-01 | End: 2020-12-03 | Stop reason: SDUPTHER

## 2020-06-05 ENCOUNTER — LAB (OUTPATIENT)
Dept: LAB | Facility: CLINIC | Age: 45
End: 2020-06-05
Payer: COMMERCIAL

## 2020-06-05 DIAGNOSIS — R73.03 PREDIABETES: ICD-10-CM

## 2020-06-05 DIAGNOSIS — D50.9 IRON DEFICIENCY ANEMIA, UNSPECIFIED IRON DEFICIENCY ANEMIA TYPE: ICD-10-CM

## 2020-06-05 DIAGNOSIS — E55.9 VITAMIN D DEFICIENCY: ICD-10-CM

## 2020-06-05 DIAGNOSIS — I10 ESSENTIAL HYPERTENSION: ICD-10-CM

## 2020-06-05 DIAGNOSIS — Z98.84 HISTORY OF ROUX-EN-Y GASTRIC BYPASS: ICD-10-CM

## 2020-06-05 DIAGNOSIS — R06.00 DYSPNEA ON EXERTION: ICD-10-CM

## 2020-06-05 DIAGNOSIS — E66.01 CLASS 3 SEVERE OBESITY DUE TO EXCESS CALORIES WITH SERIOUS COMORBIDITY AND BODY MASS INDEX (BMI) OF 40.0 TO 44.9 IN ADULT (HCC): ICD-10-CM

## 2020-06-05 DIAGNOSIS — M79.89 LEG SWELLING: ICD-10-CM

## 2020-06-05 DIAGNOSIS — D50.9 MICROCYTIC ANEMIA: Primary | ICD-10-CM

## 2020-06-05 DIAGNOSIS — R31.9 HEMATURIA, UNSPECIFIED TYPE: ICD-10-CM

## 2020-06-05 LAB
25(OH)D3 SERPL-MCNC: 10.7 NG/ML (ref 30–100)
ALBUMIN SERPL BCP-MCNC: 3.5 G/DL (ref 3.5–5)
ALP SERPL-CCNC: 96 U/L (ref 46–116)
ALT SERPL W P-5'-P-CCNC: 19 U/L (ref 12–78)
ANION GAP SERPL CALCULATED.3IONS-SCNC: 7 MMOL/L (ref 4–13)
AST SERPL W P-5'-P-CCNC: 8 U/L (ref 5–45)
BACTERIA UR QL AUTO: NORMAL /HPF
BASOPHILS # BLD AUTO: 0.08 THOUSANDS/ΜL (ref 0–0.1)
BASOPHILS NFR BLD AUTO: 1 % (ref 0–1)
BILIRUB SERPL-MCNC: 0.31 MG/DL (ref 0.2–1)
BILIRUB UR QL STRIP: NEGATIVE
BUN SERPL-MCNC: 11 MG/DL (ref 5–25)
CALCIUM SERPL-MCNC: 8.7 MG/DL (ref 8.3–10.1)
CHLORIDE SERPL-SCNC: 108 MMOL/L (ref 100–108)
CHOLEST SERPL-MCNC: 186 MG/DL (ref 50–200)
CLARITY UR: ABNORMAL
CO2 SERPL-SCNC: 23 MMOL/L (ref 21–32)
COLOR UR: YELLOW
CREAT SERPL-MCNC: 0.66 MG/DL (ref 0.6–1.3)
CREAT UR-MCNC: 129 MG/DL
CREAT UR-MCNC: 129 MG/DL
EOSINOPHIL # BLD AUTO: 0.29 THOUSAND/ΜL (ref 0–0.61)
EOSINOPHIL NFR BLD AUTO: 5 % (ref 0–6)
ERYTHROCYTE [DISTWIDTH] IN BLOOD BY AUTOMATED COUNT: 18.3 % (ref 11.6–15.1)
EST. AVERAGE GLUCOSE BLD GHB EST-MCNC: 140 MG/DL
FERRITIN SERPL-MCNC: 2 NG/ML (ref 8–388)
GFR SERPL CREATININE-BSD FRML MDRD: 108 ML/MIN/1.73SQ M
GLUCOSE P FAST SERPL-MCNC: 99 MG/DL (ref 65–99)
GLUCOSE UR STRIP-MCNC: NEGATIVE MG/DL
HBA1C MFR BLD: 6.5 %
HCT VFR BLD AUTO: 31.3 % (ref 34.8–46.1)
HDLC SERPL-MCNC: 57 MG/DL
HGB BLD-MCNC: 8.8 G/DL (ref 11.5–15.4)
HGB UR QL STRIP.AUTO: ABNORMAL
IMM GRANULOCYTES # BLD AUTO: 0.02 THOUSAND/UL (ref 0–0.2)
IMM GRANULOCYTES NFR BLD AUTO: 0 % (ref 0–2)
KETONES UR STRIP-MCNC: NEGATIVE MG/DL
LDLC SERPL CALC-MCNC: 96 MG/DL (ref 0–100)
LEUKOCYTE ESTERASE UR QL STRIP: NEGATIVE
LYMPHOCYTES # BLD AUTO: 2.45 THOUSANDS/ΜL (ref 0.6–4.47)
LYMPHOCYTES NFR BLD AUTO: 38 % (ref 14–44)
MCH RBC QN AUTO: 18.4 PG (ref 26.8–34.3)
MCHC RBC AUTO-ENTMCNC: 28.1 G/DL (ref 31.4–37.4)
MCV RBC AUTO: 66 FL (ref 82–98)
MICROALBUMIN UR-MCNC: 14.3 MG/L (ref 0–20)
MICROALBUMIN/CREAT 24H UR: 11 MG/G CREATININE (ref 0–30)
MONOCYTES # BLD AUTO: 0.39 THOUSAND/ΜL (ref 0.17–1.22)
MONOCYTES NFR BLD AUTO: 6 % (ref 4–12)
NEUTROPHILS # BLD AUTO: 3.15 THOUSANDS/ΜL (ref 1.85–7.62)
NEUTS SEG NFR BLD AUTO: 50 % (ref 43–75)
NITRITE UR QL STRIP: NEGATIVE
NON-SQ EPI CELLS URNS QL MICRO: NORMAL /HPF
NRBC BLD AUTO-RTO: 0 /100 WBCS
NT-PROBNP SERPL-MCNC: 43 PG/ML
PH UR STRIP.AUTO: 8.5 [PH]
PLATELET # BLD AUTO: 427 THOUSANDS/UL (ref 149–390)
PMV BLD AUTO: 9.5 FL (ref 8.9–12.7)
POTASSIUM SERPL-SCNC: 4.1 MMOL/L (ref 3.5–5.3)
PROT SERPL-MCNC: 6.6 G/DL (ref 6.4–8.2)
PROT UR STRIP-MCNC: ABNORMAL MG/DL
PROT UR-MCNC: 17 MG/DL
PROT/CREAT UR: 0.13 MG/G{CREAT} (ref 0–0.1)
RBC # BLD AUTO: 4.77 MILLION/UL (ref 3.81–5.12)
RBC #/AREA URNS AUTO: NORMAL /HPF
SODIUM SERPL-SCNC: 138 MMOL/L (ref 136–145)
SP GR UR STRIP.AUTO: 1.02 (ref 1–1.03)
TRIGL SERPL-MCNC: 164 MG/DL
TSH SERPL DL<=0.05 MIU/L-ACNC: 1.3 UIU/ML (ref 0.36–3.74)
UROBILINOGEN UR QL STRIP.AUTO: 1 E.U./DL
VIT B12 SERPL-MCNC: 342 PG/ML (ref 100–900)
WBC # BLD AUTO: 6.38 THOUSAND/UL (ref 4.31–10.16)
WBC #/AREA URNS AUTO: NORMAL /HPF

## 2020-06-05 PROCEDURE — 80053 COMPREHEN METABOLIC PANEL: CPT

## 2020-06-05 PROCEDURE — 84443 ASSAY THYROID STIM HORMONE: CPT

## 2020-06-05 PROCEDURE — 83880 ASSAY OF NATRIURETIC PEPTIDE: CPT

## 2020-06-05 PROCEDURE — 82570 ASSAY OF URINE CREATININE: CPT | Performed by: INTERNAL MEDICINE

## 2020-06-05 PROCEDURE — 82043 UR ALBUMIN QUANTITATIVE: CPT

## 2020-06-05 PROCEDURE — 82728 ASSAY OF FERRITIN: CPT

## 2020-06-05 PROCEDURE — 36415 COLL VENOUS BLD VENIPUNCTURE: CPT

## 2020-06-05 PROCEDURE — 82607 VITAMIN B-12: CPT

## 2020-06-05 PROCEDURE — 81001 URINALYSIS AUTO W/SCOPE: CPT | Performed by: INTERNAL MEDICINE

## 2020-06-05 PROCEDURE — 85025 COMPLETE CBC W/AUTO DIFF WBC: CPT

## 2020-06-05 PROCEDURE — 80061 LIPID PANEL: CPT

## 2020-06-05 PROCEDURE — 84156 ASSAY OF PROTEIN URINE: CPT | Performed by: INTERNAL MEDICINE

## 2020-06-05 PROCEDURE — 83036 HEMOGLOBIN GLYCOSYLATED A1C: CPT

## 2020-06-05 PROCEDURE — 82570 ASSAY OF URINE CREATININE: CPT

## 2020-06-05 PROCEDURE — 82306 VITAMIN D 25 HYDROXY: CPT

## 2020-06-06 RX ORDER — ERGOCALCIFEROL 1.25 MG/1
50000 CAPSULE ORAL WEEKLY
Qty: 12 CAPSULE | Refills: 0 | Status: SHIPPED | OUTPATIENT
Start: 2020-06-06 | End: 2020-09-04 | Stop reason: SDUPTHER

## 2020-06-06 RX ORDER — MULTIVIT WITH MINERALS/LUTEIN
250 TABLET ORAL DAILY
Qty: 90 TABLET | Refills: 1
Start: 2020-06-06

## 2020-06-06 RX ORDER — FERROUS SULFATE TAB EC 324 MG (65 MG FE EQUIVALENT) 324 (65 FE) MG
TABLET DELAYED RESPONSE ORAL
Qty: 90 TABLET | Refills: 1
Start: 2020-06-06

## 2020-06-26 ENCOUNTER — APPOINTMENT (OUTPATIENT)
Dept: LAB | Facility: CLINIC | Age: 45
End: 2020-06-26
Payer: COMMERCIAL

## 2020-06-26 DIAGNOSIS — D50.9 MICROCYTIC ANEMIA: ICD-10-CM

## 2020-06-26 LAB
BACTERIA UR QL AUTO: ABNORMAL /HPF
BILIRUB UR QL STRIP: NEGATIVE
CLARITY UR: ABNORMAL
COLOR UR: YELLOW
FERRITIN SERPL-MCNC: 6 NG/ML (ref 8–388)
GLUCOSE UR STRIP-MCNC: NEGATIVE MG/DL
HGB UR QL STRIP.AUTO: NEGATIVE
IRON SATN MFR SERPL: 38 %
IRON SERPL-MCNC: 179 UG/DL (ref 50–170)
KETONES UR STRIP-MCNC: ABNORMAL MG/DL
LEUKOCYTE ESTERASE UR QL STRIP: ABNORMAL
NITRITE UR QL STRIP: NEGATIVE
NON-SQ EPI CELLS URNS QL MICRO: ABNORMAL /HPF
PH UR STRIP.AUTO: 6 [PH]
PROT UR STRIP-MCNC: NEGATIVE MG/DL
RBC #/AREA URNS AUTO: ABNORMAL /HPF
SP GR UR STRIP.AUTO: 1.02 (ref 1–1.03)
TIBC SERPL-MCNC: 469 UG/DL (ref 250–450)
UROBILINOGEN UR QL STRIP.AUTO: 0.2 E.U./DL
WBC #/AREA URNS AUTO: ABNORMAL /HPF

## 2020-06-26 PROCEDURE — 83550 IRON BINDING TEST: CPT

## 2020-06-26 PROCEDURE — 82728 ASSAY OF FERRITIN: CPT

## 2020-06-26 PROCEDURE — 36415 COLL VENOUS BLD VENIPUNCTURE: CPT

## 2020-06-26 PROCEDURE — 83540 ASSAY OF IRON: CPT

## 2020-06-26 PROCEDURE — 81001 URINALYSIS AUTO W/SCOPE: CPT

## 2020-07-16 ENCOUNTER — TELEPHONE (OUTPATIENT)
Dept: FAMILY MEDICINE CLINIC | Facility: CLINIC | Age: 45
End: 2020-07-16

## 2020-07-16 DIAGNOSIS — L03.011 PARONYCHIA OF FINGER OF RIGHT HAND: Primary | ICD-10-CM

## 2020-07-16 RX ORDER — SULFAMETHOXAZOLE AND TRIMETHOPRIM 800; 160 MG/1; MG/1
1 TABLET ORAL 2 TIMES DAILY
Qty: 10 TABLET | Refills: 0 | Status: SHIPPED | OUTPATIENT
Start: 2020-07-16 | End: 2020-07-21

## 2020-07-16 NOTE — TELEPHONE ENCOUNTER
Please advise pt that I can send oral antibiotic but if it's not improving or worsening there may be abscess at the nail bed that needs to be drained  I'll send Bactrim to her pharmacy  She should follow up with us Monday if it's not improving   Thx

## 2020-07-16 NOTE — TELEPHONE ENCOUNTER
Pt called and she has an infection on her left hand ,2nd finger at the nail    She get them from using the hand  and gloves    She has had is for 1 week , has used OVC antibiotic cream and is not getting better    She had went to the urgent care in May for the same problem    She is asking for an antibiotic    Pharmacy [facility]    Please advise    [phone]

## 2020-07-27 ENCOUNTER — OFFICE VISIT (OUTPATIENT)
Dept: FAMILY MEDICINE CLINIC | Facility: CLINIC | Age: 45
End: 2020-07-27
Payer: COMMERCIAL

## 2020-07-27 VITALS
HEART RATE: 70 BPM | TEMPERATURE: 98 F | HEIGHT: 67 IN | BODY MASS INDEX: 36.73 KG/M2 | SYSTOLIC BLOOD PRESSURE: 124 MMHG | DIASTOLIC BLOOD PRESSURE: 70 MMHG | WEIGHT: 234 LBS | OXYGEN SATURATION: 98 % | RESPIRATION RATE: 18 BRPM

## 2020-07-27 DIAGNOSIS — R31.29 MICROSCOPIC HEMATURIA: ICD-10-CM

## 2020-07-27 DIAGNOSIS — M54.50 CHRONIC LOW BACK PAIN, UNSPECIFIED BACK PAIN LATERALITY, UNSPECIFIED WHETHER SCIATICA PRESENT: ICD-10-CM

## 2020-07-27 DIAGNOSIS — Z98.84 HISTORY OF ROUX-EN-Y GASTRIC BYPASS: ICD-10-CM

## 2020-07-27 DIAGNOSIS — E53.8 B12 DEFICIENCY: ICD-10-CM

## 2020-07-27 DIAGNOSIS — M48.061 SPINAL STENOSIS OF LUMBAR REGION WITHOUT NEUROGENIC CLAUDICATION: ICD-10-CM

## 2020-07-27 DIAGNOSIS — G89.29 CHRONIC LOW BACK PAIN, UNSPECIFIED BACK PAIN LATERALITY, UNSPECIFIED WHETHER SCIATICA PRESENT: ICD-10-CM

## 2020-07-27 DIAGNOSIS — E55.9 VITAMIN D DEFICIENCY: ICD-10-CM

## 2020-07-27 DIAGNOSIS — D50.8 OTHER IRON DEFICIENCY ANEMIA: Primary | ICD-10-CM

## 2020-07-27 PROBLEM — D64.9 ABSOLUTE ANEMIA: Status: ACTIVE | Noted: 2020-07-27

## 2020-07-27 PROCEDURE — 3008F BODY MASS INDEX DOCD: CPT | Performed by: INTERNAL MEDICINE

## 2020-07-27 PROCEDURE — 3078F DIAST BP <80 MM HG: CPT | Performed by: INTERNAL MEDICINE

## 2020-07-27 PROCEDURE — 1036F TOBACCO NON-USER: CPT | Performed by: INTERNAL MEDICINE

## 2020-07-27 PROCEDURE — 99214 OFFICE O/P EST MOD 30 MIN: CPT | Performed by: INTERNAL MEDICINE

## 2020-07-27 PROCEDURE — 3074F SYST BP LT 130 MM HG: CPT | Performed by: INTERNAL MEDICINE

## 2020-07-27 RX ORDER — DULOXETIN HYDROCHLORIDE 60 MG/1
120 CAPSULE, DELAYED RELEASE ORAL DAILY
Qty: 60 CAPSULE | Refills: 2 | Status: SHIPPED | OUTPATIENT
Start: 2020-07-27 | End: 2020-10-19 | Stop reason: SDUPTHER

## 2020-07-27 RX ORDER — CYANOCOBALAMIN 1000 UG/ML
1000 INJECTION INTRAMUSCULAR; SUBCUTANEOUS
Qty: 1 ML | Refills: 3 | Status: SHIPPED | OUTPATIENT
Start: 2020-07-27 | End: 2020-09-28 | Stop reason: SDUPTHER

## 2020-07-27 NOTE — PROGRESS NOTES
[facility]        NAME: Nahun Calhoun is a 40 y o  female  : 1975    MRN: [id number]  DATE: 2020  TIME: 8:09 AM    Assessment and Plan     Problem List Items Addressed This Visit        Genitourinary    Microscopic hematuria  Resolved, likely related to menses  Other    Spinal stenosis of lumbar region without neurogenic claudication    Increase cymbalta to 120mg, trial of PT  Relevant Orders    Ambulatory referral to Physical Therapy    History of Toni-en-Y gastric bypass    Absolute anemia - Primary    Microcytic, elevated RDW, low ferritin  Likely related to dietary intake and prior gastric bypass  Taking po FeSO4 with symptomatic improvement  Will repeat CBC at this time, if Hgb still < 10, will start IV iron  Check stool blood  Relevant Medications    cyanocobalamin 1,000 mcg/mL    Other Relevant Orders    CBC and differential    Occult Blood, Fecal Immunochemical    Vitamin D deficiency  D25OH very low at 10  Taking high-dose weekly formulation, will repeat testing in 2 months  B12 deficiency  Mild, B12 342 on recent labs  Will arranged for IM injections due to history of gastric bypass    Relevant Medications    cyanocobalamin 1,000 mcg/mL      Other Visit Diagnoses     Chronic low back pain, unspecified back pain laterality, unspecified whether sciatica present        Relevant Medications    DULoxetine (CYMBALTA) 60 mg delayed release capsule    Other Relevant Orders    Ambulatory referral to Physical Therapy               Chief Complaint     Chief Complaint   Patient presents with    Follow-up     one month follow up         History of Present Illness       Here for one month follow up iron deficiency anemia, HTN and obesity  Anemia: feels less fatigued but still gets short of breath at time  Not falling asleep at work as much  Taking 2 tablets FeSO4 every other day with vitamin C  Did not get stool blood testing done   Toni-en-Y gastric bypass in 2011  Also history of GERD and nausea for which she takes PPI and prn zofran  Menses occur every 28 days, last about 4-5 days, denies heavy bleeding like changing pad/tampon every hour  Obesity: lost about 15lbs since last visit  Walking 2 miles per day, eating better  Back pain: chronic, taking cymbalta and advil  Cymbalta helped initially, but no longer helping  Has never done PT  No weakness or falls  Review of Systems   Review of Systems   Constitutional: Positive for activity change and fatigue  Negative for chills, fever and unexpected weight change  Respiratory: Positive for shortness of breath  Negative for cough and chest tightness  Cardiovascular: Negative for chest pain, palpitations and leg swelling  Gastrointestinal: Positive for nausea  Negative for abdominal pain, blood in stool, constipation and diarrhea  Genitourinary: Negative for difficulty urinating, dysuria, frequency, hematuria, menstrual problem and urgency  Musculoskeletal: Positive for back pain  Hematological: Negative for adenopathy  Does not bruise/bleed easily           Current Medications       Current Outpatient Medications:     ascorbic acid (VITAMIN C) 250 mg tablet, Take 1 tablet (250 mg total) by mouth daily With FeSO4, Disp: 90 tablet, Rfl: 1    cetirizine (ZyrTEC) 10 mg tablet, , Disp: , Rfl:     DULoxetine (CYMBALTA) 60 mg delayed release capsule, Take 2 capsules (120 mg total) by mouth daily, Disp: 60 capsule, Rfl: 2    ergocalciferol (VITAMIN D2) 50,000 units, Take 1 capsule (50,000 Units total) by mouth once a week, Disp: 12 capsule, Rfl: 0    ferrous sulfate 324 (65 Fe) mg, Take 2-3 tabs every other day with orange juice or vitamin C, Disp: 90 tablet, Rfl: 1    metoprolol succinate (TOPROL-XL) 25 mg 24 hr tablet, Take 1 tablet (25 mg total) by mouth daily, Disp: 90 tablet, Rfl: 1    ondansetron (ZOFRAN) 8 mg tablet, 1 tab(s), Disp: , Rfl:     pantoprazole (PROTONIX) 40 mg tablet, Take 40 mg by mouth daily, Disp: , Rfl:     cyanocobalamin 1,000 mcg/mL, Inject 1 mL (1,000 mcg total) into a muscle every 30 (thirty) days, Disp: 1 mL, Rfl: 3    Current Allergies     Allergies as of 07/27/2020 - Reviewed 07/27/2020   Allergen Reaction Noted    Bee pollen  09/29/2016    Pollen extract  09/29/2016            The following portions of the patient's history were reviewed and updated as appropriate: allergies, current medications, past family history, past medical history, past social history, past surgical history and problem list      Past Medical History:   Diagnosis Date    GERD (gastroesophageal reflux disease)     Spinal stenosis        Past Surgical History:   Procedure Laterality Date    GASTRIC BYPASS         Family History   Problem Relation Age of Onset    Diabetes Mother     Hypertension Mother     Diabetes Father     Hypertension Father     Heart disease Father          Medications have been verified  Objective   /70   Pulse 70   Temp 98 °F (36 7 °C)   Resp 18   Ht 5' 7" (1 702 m)   Wt 106 kg (234 lb)   SpO2 98%   BMI 36 65 kg/m²        Physical Exam     Physical Exam   Constitutional: She appears well-developed and well-nourished  No distress  Cardiovascular: Normal rate, regular rhythm, normal heart sounds and intact distal pulses  Exam reveals no gallop and no friction rub  No murmur heard  Pulmonary/Chest: Effort normal and breath sounds normal  No respiratory distress  She has no wheezes  She has no rales  Abdominal: Soft  Bowel sounds are normal  She exhibits no distension and no mass  There is no tenderness  There is no rebound and no guarding  No hernia  Vitals reviewed            Results:  Lab Results   Component Value Date    SODIUM 138 06/05/2020    K 4 1 06/05/2020     06/05/2020    CO2 23 06/05/2020    BUN 11 06/05/2020    CREATININE 0 66 06/05/2020    CALCIUM 8 7 06/05/2020       Lab Results   Component Value Date    HGBA1C 6 5 (H) 06/05/2020 Lab Results   Component Value Date    WBC 6 38 06/05/2020    HGB 8 8 (L) 06/05/2020    HCT 31 3 (L) 06/05/2020    MCV 66 (L) 06/05/2020     (H) 06/05/2020     Irone 179, ferritin 6, %saturation 38, TIBC 469 (06/26/2020)    D25OH 10 7  B12 342

## 2020-07-31 ENCOUNTER — APPOINTMENT (OUTPATIENT)
Dept: LAB | Facility: CLINIC | Age: 45
End: 2020-07-31
Payer: COMMERCIAL

## 2020-07-31 DIAGNOSIS — D50.8 OTHER IRON DEFICIENCY ANEMIA: ICD-10-CM

## 2020-07-31 LAB
BASOPHILS # BLD AUTO: 0.07 THOUSANDS/ΜL (ref 0–0.1)
BASOPHILS NFR BLD AUTO: 1 % (ref 0–1)
EOSINOPHIL # BLD AUTO: 0.3 THOUSAND/ΜL (ref 0–0.61)
EOSINOPHIL NFR BLD AUTO: 5 % (ref 0–6)
ERYTHROCYTE [DISTWIDTH] IN BLOOD BY AUTOMATED COUNT: 24.6 % (ref 11.6–15.1)
HCT VFR BLD AUTO: 40 % (ref 34.8–46.1)
HGB BLD-MCNC: 11.7 G/DL (ref 11.5–15.4)
IMM GRANULOCYTES # BLD AUTO: 0.01 THOUSAND/UL (ref 0–0.2)
IMM GRANULOCYTES NFR BLD AUTO: 0 % (ref 0–2)
LYMPHOCYTES # BLD AUTO: 2.12 THOUSANDS/ΜL (ref 0.6–4.47)
LYMPHOCYTES NFR BLD AUTO: 37 % (ref 14–44)
MCH RBC QN AUTO: 20.7 PG (ref 26.8–34.3)
MCHC RBC AUTO-ENTMCNC: 29.3 G/DL (ref 31.4–37.4)
MCV RBC AUTO: 71 FL (ref 82–98)
MONOCYTES # BLD AUTO: 0.43 THOUSAND/ΜL (ref 0.17–1.22)
MONOCYTES NFR BLD AUTO: 8 % (ref 4–12)
NEUTROPHILS # BLD AUTO: 2.75 THOUSANDS/ΜL (ref 1.85–7.62)
NEUTS SEG NFR BLD AUTO: 49 % (ref 43–75)
NRBC BLD AUTO-RTO: 0 /100 WBCS
PLATELET # BLD AUTO: 287 THOUSANDS/UL (ref 149–390)
PMV BLD AUTO: 10.2 FL (ref 8.9–12.7)
RBC # BLD AUTO: 5.64 MILLION/UL (ref 3.81–5.12)
WBC # BLD AUTO: 5.68 THOUSAND/UL (ref 4.31–10.16)

## 2020-07-31 PROCEDURE — 36415 COLL VENOUS BLD VENIPUNCTURE: CPT

## 2020-07-31 PROCEDURE — 85025 COMPLETE CBC W/AUTO DIFF WBC: CPT

## 2020-09-01 ENCOUNTER — APPOINTMENT (OUTPATIENT)
Dept: LAB | Facility: CLINIC | Age: 45
End: 2020-09-01
Payer: COMMERCIAL

## 2020-09-01 DIAGNOSIS — D50.9 IRON DEFICIENCY ANEMIA, UNSPECIFIED IRON DEFICIENCY ANEMIA TYPE: ICD-10-CM

## 2020-09-01 DIAGNOSIS — D50.8 OTHER IRON DEFICIENCY ANEMIA: ICD-10-CM

## 2020-09-01 LAB
HEMOCCULT STL QL: NEGATIVE

## 2020-09-01 PROCEDURE — 82272 OCCULT BLD FECES 1-3 TESTS: CPT

## 2020-09-04 DIAGNOSIS — E55.9 VITAMIN D DEFICIENCY: ICD-10-CM

## 2020-09-04 RX ORDER — ERGOCALCIFEROL 1.25 MG/1
50000 CAPSULE ORAL WEEKLY
Qty: 12 CAPSULE | Refills: 0 | Status: SHIPPED | OUTPATIENT
Start: 2020-09-04 | End: 2020-09-28 | Stop reason: SDUPTHER

## 2020-09-15 ENCOUNTER — TELEPHONE (OUTPATIENT)
Dept: FAMILY MEDICINE CLINIC | Facility: CLINIC | Age: 45
End: 2020-09-15

## 2020-09-17 DIAGNOSIS — D50.8 OTHER IRON DEFICIENCY ANEMIA: Primary | ICD-10-CM

## 2020-09-17 DIAGNOSIS — E56.9 VITAMIN DEFICIENCY: ICD-10-CM

## 2020-09-17 DIAGNOSIS — Z98.84 HISTORY OF ROUX-EN-Y GASTRIC BYPASS: ICD-10-CM

## 2020-09-17 DIAGNOSIS — E53.8 B12 DEFICIENCY: ICD-10-CM

## 2020-09-25 ENCOUNTER — APPOINTMENT (OUTPATIENT)
Dept: LAB | Facility: CLINIC | Age: 45
End: 2020-09-25
Payer: COMMERCIAL

## 2020-09-25 DIAGNOSIS — E53.8 B12 DEFICIENCY: ICD-10-CM

## 2020-09-25 DIAGNOSIS — Z98.84 HISTORY OF ROUX-EN-Y GASTRIC BYPASS: ICD-10-CM

## 2020-09-25 DIAGNOSIS — E56.9 VITAMIN DEFICIENCY: ICD-10-CM

## 2020-09-25 DIAGNOSIS — D50.8 OTHER IRON DEFICIENCY ANEMIA: ICD-10-CM

## 2020-09-25 DIAGNOSIS — E55.9 VITAMIN D DEFICIENCY: ICD-10-CM

## 2020-09-25 LAB
25(OH)D3 SERPL-MCNC: 17.1 NG/ML (ref 30–100)
BASOPHILS # BLD AUTO: 0.07 THOUSANDS/ΜL (ref 0–0.1)
BASOPHILS NFR BLD AUTO: 1 % (ref 0–1)
EOSINOPHIL # BLD AUTO: 0.36 THOUSAND/ΜL (ref 0–0.61)
EOSINOPHIL NFR BLD AUTO: 5 % (ref 0–6)
ERYTHROCYTE [DISTWIDTH] IN BLOOD BY AUTOMATED COUNT: 22 % (ref 11.6–15.1)
FERRITIN SERPL-MCNC: 7 NG/ML (ref 8–388)
FOLATE SERPL-MCNC: 6 NG/ML (ref 3.1–17.5)
HCT VFR BLD AUTO: 43 % (ref 34.8–46.1)
HGB BLD-MCNC: 13.1 G/DL (ref 11.5–15.4)
IMM GRANULOCYTES # BLD AUTO: 0.02 THOUSAND/UL (ref 0–0.2)
IMM GRANULOCYTES NFR BLD AUTO: 0 % (ref 0–2)
IRON SATN MFR SERPL: 22 %
IRON SERPL-MCNC: 94 UG/DL (ref 50–170)
LYMPHOCYTES # BLD AUTO: 2.4 THOUSANDS/ΜL (ref 0.6–4.47)
LYMPHOCYTES NFR BLD AUTO: 33 % (ref 14–44)
MCH RBC QN AUTO: 23.4 PG (ref 26.8–34.3)
MCHC RBC AUTO-ENTMCNC: 30.5 G/DL (ref 31.4–37.4)
MCV RBC AUTO: 77 FL (ref 82–98)
MONOCYTES # BLD AUTO: 0.39 THOUSAND/ΜL (ref 0.17–1.22)
MONOCYTES NFR BLD AUTO: 5 % (ref 4–12)
NEUTROPHILS # BLD AUTO: 4.09 THOUSANDS/ΜL (ref 1.85–7.62)
NEUTS SEG NFR BLD AUTO: 56 % (ref 43–75)
NRBC BLD AUTO-RTO: 0 /100 WBCS
PLATELET # BLD AUTO: 327 THOUSANDS/UL (ref 149–390)
PMV BLD AUTO: 10.1 FL (ref 8.9–12.7)
RBC # BLD AUTO: 5.6 MILLION/UL (ref 3.81–5.12)
TIBC SERPL-MCNC: 434 UG/DL (ref 250–450)
VIT B12 SERPL-MCNC: 223 PG/ML (ref 100–900)
WBC # BLD AUTO: 7.33 THOUSAND/UL (ref 4.31–10.16)

## 2020-09-25 PROCEDURE — 82728 ASSAY OF FERRITIN: CPT

## 2020-09-25 PROCEDURE — 82746 ASSAY OF FOLIC ACID SERUM: CPT

## 2020-09-25 PROCEDURE — 84425 ASSAY OF VITAMIN B-1: CPT

## 2020-09-25 PROCEDURE — 36415 COLL VENOUS BLD VENIPUNCTURE: CPT

## 2020-09-25 PROCEDURE — 82607 VITAMIN B-12: CPT

## 2020-09-25 PROCEDURE — 83540 ASSAY OF IRON: CPT

## 2020-09-25 PROCEDURE — 85025 COMPLETE CBC W/AUTO DIFF WBC: CPT

## 2020-09-25 PROCEDURE — 83550 IRON BINDING TEST: CPT

## 2020-09-25 PROCEDURE — 82306 VITAMIN D 25 HYDROXY: CPT

## 2020-09-27 PROBLEM — D50.8 IRON DEFICIENCY ANEMIA SECONDARY TO INADEQUATE DIETARY IRON INTAKE: Status: ACTIVE | Noted: 2020-07-27

## 2020-09-27 NOTE — PROGRESS NOTES
[facility]        NAME: Otilia Servin is a 40 y o  female  : 1975    MRN: [id number]  DATE: 2020  TIME: 9:10 AM    Assessment and Plan   1  Class 3 severe obesity due to excess calories with serious comorbidity and body mass index (BMI) of 40 0 to 44 9 in [facility])    - pt has never tried medications for weight loss  Discussed trial of metformin due to concurrent pre-diabetes  Discussed referral to weight management for nutritional evaluation/dietary recommendations  -     Ambulatory referral to Weight Management; Future    2  B12 deficiency  - B12 level 223  Increase to weekly injections for 4 weeks, then repeat levels  -  cyanocobalamin 1,000 mcg/mL; Inject 1 mL (1,000 mcg total) into a muscle once a week for 4 doses  -     Vitamin B12; Future    3  Vitamin D deficiency  - slightly improved  Will continue high-dose supplementation for 6 more weeks  -    ergocalciferol (VITAMIN D2) 50,000 units; Take 1 capsule (50,000 Units total) by mouth once a week for 6 doses  -     Vitamin D 25 hydroxy; Future    4  Iron deficiency anemia secondary to inadequate dietary iron intake  - continue po FeSO4, ferritin sill low but Hgb has improved  Discussed possible IV iron, will inquire whether insurance will approve  5  History of Toni-en-Y gastric bypass  -     Ambulatory referral to Weight Management; Future  -     folic acid (FOLVITE) 054 mcg tablet; Take 1 tablet (400 mcg total) by mouth daily    6  Prediabetes  - A1c 6 3%  Discussed using metformin to help with weight loss  -  metFORMIN (GLUCOPHAGE) 500 mg tablet; Take 1 tablet (500 mg total) by mouth daily with dinner             Chief Complaint     Chief Complaint   Patient presents with    Follow-up         History of Present Illness       Here for follow up iron deficiency anemia, vitamin D deficiency, fatigue and obesity  Anemia: still taking po FeSO4 and vitamin C   Complains of continued fatigue, low energy despite adequate sleep (8 hrs), shortness of breath has resolved  B12 deficiency: doing home injections monthly  Obesity: weight loss has plateaued despite low carb diet  Pt tries to exercise by walking but hasn't been able to do so recently due to work schedule, home improvement projects and fatigue  Is interested in meeting with nutritionist, weight management  Review of Systems   Review of Systems   Constitutional: Positive for fatigue  Negative for chills, fever and unexpected weight change  HENT: Positive for dental problem, ear pain, postnasal drip, rhinorrhea and sinus pressure  Negative for congestion, ear discharge, facial swelling, hearing loss, sore throat and trouble swallowing  Eyes: Negative for pain, redness, itching and visual disturbance  Respiratory: Negative for cough, chest tightness, shortness of breath and wheezing  Cardiovascular: Negative for chest pain, palpitations and leg swelling  Gastrointestinal: Negative for abdominal pain, blood in stool, constipation, diarrhea and nausea  Endocrine: Negative for cold intolerance, heat intolerance, polydipsia and polyuria  Genitourinary: Negative for dysuria, frequency, hematuria and urgency  Musculoskeletal: Negative for arthralgias, back pain and joint swelling  Skin: Negative for rash  Neurological: Negative for dizziness, tremors, weakness, light-headedness, numbness and headaches  Psychiatric/Behavioral: Negative for confusion, dysphoric mood, hallucinations and suicidal ideas  The patient is not nervous/anxious            Current Medications       Current Outpatient Medications:     ascorbic acid (VITAMIN C) 250 mg tablet, Take 1 tablet (250 mg total) by mouth daily With FeSO4, Disp: 90 tablet, Rfl: 1    cetirizine (ZyrTEC) 10 mg tablet, , Disp: , Rfl:     cyanocobalamin 1,000 mcg/mL, Inject 1 mL (1,000 mcg total) into a muscle once a week for 4 doses, Disp: 4 mL, Rfl: 0    DULoxetine (CYMBALTA) 60 mg delayed release capsule, Take 2 capsules (120 mg total) by mouth daily, Disp: 60 capsule, Rfl: 2    ergocalciferol (VITAMIN D2) 50,000 units, Take 1 capsule (50,000 Units total) by mouth once a week for 6 doses, Disp: 6 capsule, Rfl: 0    ferrous sulfate 324 (65 Fe) mg, Take 2-3 tabs every other day with orange juice or vitamin C, Disp: 90 tablet, Rfl: 1    metoprolol succinate (TOPROL-XL) 25 mg 24 hr tablet, Take 1 tablet (25 mg total) by mouth daily, Disp: 90 tablet, Rfl: 1    ondansetron (ZOFRAN) 8 mg tablet, 1 tab(s), Disp: , Rfl:     pantoprazole (PROTONIX) 40 mg tablet, Take 40 mg by mouth daily, Disp: , Rfl:     folic acid (FOLVITE) 422 mcg tablet, Take 1 tablet (400 mcg total) by mouth daily, Disp: 30 tablet, Rfl: 3    metFORMIN (GLUCOPHAGE) 500 mg tablet, Take 1 tablet (500 mg total) by mouth daily with dinner, Disp: 30 tablet, Rfl: 3    Current Allergies     Allergies as of 09/28/2020 - Reviewed 09/28/2020   Allergen Reaction Noted    Bee pollen  09/29/2016    Pollen extract  09/29/2016            The following portions of the patient's history were reviewed and updated as appropriate: allergies, current medications, past family history, past medical history, past social history, past surgical history and problem list      Past Medical History:   Diagnosis Date    GERD (gastroesophageal reflux disease)     Spinal stenosis        Past Surgical History:   Procedure Laterality Date    GASTRIC BYPASS         Family History   Problem Relation Age of Onset    Diabetes Mother     Hypertension Mother     Diabetes Father     Hypertension Father     Heart disease Father          Medications have been verified  Objective   /62   Pulse 78   Temp 98 °F (36 7 °C)   Resp 18   Ht 5' 7" (1 702 m)   Wt 109 kg (240 lb)   SpO2 98%   BMI 37 59 kg/m²        Physical Exam     Physical Exam  Vitals signs reviewed     Constitutional:       General: She is not in acute distress  Appearance: She is obese  HENT:      Head: Normocephalic and atraumatic  Right Ear: Hearing, ear canal and external ear normal  A middle ear effusion is present  Tympanic membrane is not perforated, erythematous or bulging  Left Ear: Hearing, ear canal and external ear normal  Tympanic membrane is scarred  Tympanic membrane is not perforated, erythematous or bulging  Mouth/Throat:      Dentition: Abnormal dentition  Dental tenderness, gingival swelling and dental caries present  No dental abscesses or gum lesions  Tongue: No lesions  Pharynx: No posterior oropharyngeal erythema  Cardiovascular:      Rate and Rhythm: Normal rate and regular rhythm  Pulses: Normal pulses  Heart sounds: S1 normal and S2 normal  No murmur  No friction rub  No gallop  Pulmonary:      Effort: Pulmonary effort is normal       Breath sounds: Normal breath sounds  No wheezing, rhonchi or rales  Musculoskeletal:      Right lower leg: No edema  Left lower leg: No edema  Lymphadenopathy:      Head:      Right side of head: No submental, submandibular, preauricular or posterior auricular adenopathy  Left side of head: No submental, submandibular, preauricular or posterior auricular adenopathy  Cervical: No cervical adenopathy  Psychiatric:         Mood and Affect: Mood and affect normal          Speech: Speech normal          Thought Content:  Thought content normal              Results:  Lab Results   Component Value Date    SODIUM 138 06/05/2020    K 4 1 06/05/2020     06/05/2020    CO2 23 06/05/2020    BUN 11 06/05/2020    CREATININE 0 66 06/05/2020    CALCIUM 8 7 06/05/2020       Lab Results   Component Value Date    HGBA1C 6 5 (H) 06/05/2020       Lab Results   Component Value Date    WBC 7 33 09/25/2020    HGB 13 1 09/25/2020    HCT 43 0 09/25/2020    MCV 77 (L) 09/25/2020     09/25/2020     Ferritin 7  D25-OH 17  B12 223  Folate 6

## 2020-09-28 ENCOUNTER — OFFICE VISIT (OUTPATIENT)
Dept: FAMILY MEDICINE CLINIC | Facility: CLINIC | Age: 45
End: 2020-09-28
Payer: COMMERCIAL

## 2020-09-28 VITALS
OXYGEN SATURATION: 98 % | TEMPERATURE: 98 F | RESPIRATION RATE: 18 BRPM | BODY MASS INDEX: 37.67 KG/M2 | DIASTOLIC BLOOD PRESSURE: 62 MMHG | HEIGHT: 67 IN | WEIGHT: 240 LBS | SYSTOLIC BLOOD PRESSURE: 124 MMHG | HEART RATE: 78 BPM

## 2020-09-28 DIAGNOSIS — E53.8 B12 DEFICIENCY: ICD-10-CM

## 2020-09-28 DIAGNOSIS — E55.9 VITAMIN D DEFICIENCY: ICD-10-CM

## 2020-09-28 DIAGNOSIS — R73.03 PREDIABETES: ICD-10-CM

## 2020-09-28 DIAGNOSIS — K04.7 DENTAL INFECTION: ICD-10-CM

## 2020-09-28 DIAGNOSIS — D50.8 IRON DEFICIENCY ANEMIA SECONDARY TO INADEQUATE DIETARY IRON INTAKE: ICD-10-CM

## 2020-09-28 DIAGNOSIS — E66.01 CLASS 3 SEVERE OBESITY DUE TO EXCESS CALORIES WITH SERIOUS COMORBIDITY AND BODY MASS INDEX (BMI) OF 40.0 TO 44.9 IN ADULT (HCC): Primary | ICD-10-CM

## 2020-09-28 DIAGNOSIS — Z98.84 HISTORY OF ROUX-EN-Y GASTRIC BYPASS: ICD-10-CM

## 2020-09-28 PROCEDURE — 3078F DIAST BP <80 MM HG: CPT | Performed by: INTERNAL MEDICINE

## 2020-09-28 PROCEDURE — 1036F TOBACCO NON-USER: CPT | Performed by: INTERNAL MEDICINE

## 2020-09-28 PROCEDURE — 3725F SCREEN DEPRESSION PERFORMED: CPT | Performed by: INTERNAL MEDICINE

## 2020-09-28 PROCEDURE — 3074F SYST BP LT 130 MM HG: CPT | Performed by: INTERNAL MEDICINE

## 2020-09-28 PROCEDURE — 99214 OFFICE O/P EST MOD 30 MIN: CPT | Performed by: INTERNAL MEDICINE

## 2020-09-28 RX ORDER — AMOXICILLIN AND CLAVULANATE POTASSIUM 875; 125 MG/1; MG/1
1 TABLET, FILM COATED ORAL EVERY 12 HOURS SCHEDULED
Qty: 20 TABLET | Refills: 0 | Status: SHIPPED | OUTPATIENT
Start: 2020-09-28 | End: 2020-10-08

## 2020-09-28 RX ORDER — ERGOCALCIFEROL 1.25 MG/1
50000 CAPSULE ORAL WEEKLY
Qty: 6 CAPSULE | Refills: 0 | Status: SHIPPED | OUTPATIENT
Start: 2020-09-28 | End: 2020-09-28 | Stop reason: SDUPTHER

## 2020-09-28 RX ORDER — CYANOCOBALAMIN 1000 UG/ML
1000 INJECTION INTRAMUSCULAR; SUBCUTANEOUS WEEKLY
Qty: 4 ML | Refills: 0 | Status: SHIPPED | OUTPATIENT
Start: 2020-09-28 | End: 2020-10-22 | Stop reason: SDUPTHER

## 2020-09-28 RX ORDER — LANOLIN ALCOHOL/MO/W.PET/CERES
400 CREAM (GRAM) TOPICAL DAILY
Qty: 30 TABLET | Refills: 3 | Status: SHIPPED | OUTPATIENT
Start: 2020-09-28 | End: 2020-12-28 | Stop reason: SDUPTHER

## 2020-09-28 RX ORDER — ERGOCALCIFEROL 1.25 MG/1
50000 CAPSULE ORAL WEEKLY
Qty: 6 CAPSULE | Refills: 0 | Status: SHIPPED | OUTPATIENT
Start: 2020-09-28 | End: 2020-12-14 | Stop reason: SDUPTHER

## 2020-09-28 NOTE — PATIENT INSTRUCTIONS
Please increase B12 injections to weekly for 4 weeks  Continue weekly vitamin D supplement for 6 weeks  We will repeat blood work in 2 months  My office will contact you about IV iron infusions  Please start metformin 500mg daily with dinner for pre-diabetes and help with weight loss       Please start folic acid supplementation 400-800mcg daily, as well as oral B complex (B1, B6)

## 2020-09-29 LAB — VIT B1 BLD-SCNC: 101.5 NMOL/L (ref 66.5–200)

## 2020-10-06 DIAGNOSIS — K21.9 GASTROESOPHAGEAL REFLUX DISEASE, UNSPECIFIED WHETHER ESOPHAGITIS PRESENT: Primary | ICD-10-CM

## 2020-10-06 RX ORDER — PANTOPRAZOLE SODIUM 40 MG/1
40 TABLET, DELAYED RELEASE ORAL DAILY
Qty: 90 TABLET | Refills: 1 | Status: SHIPPED | OUTPATIENT
Start: 2020-10-06 | End: 2021-04-26 | Stop reason: SDUPTHER

## 2020-10-19 DIAGNOSIS — G89.29 CHRONIC LOW BACK PAIN, UNSPECIFIED BACK PAIN LATERALITY, UNSPECIFIED WHETHER SCIATICA PRESENT: ICD-10-CM

## 2020-10-19 DIAGNOSIS — M54.50 CHRONIC LOW BACK PAIN, UNSPECIFIED BACK PAIN LATERALITY, UNSPECIFIED WHETHER SCIATICA PRESENT: ICD-10-CM

## 2020-10-19 RX ORDER — DULOXETIN HYDROCHLORIDE 60 MG/1
120 CAPSULE, DELAYED RELEASE ORAL DAILY
Qty: 60 CAPSULE | Refills: 2 | Status: SHIPPED | OUTPATIENT
Start: 2020-10-19 | End: 2021-02-24 | Stop reason: SDUPTHER

## 2020-10-20 ENCOUNTER — TELEPHONE (OUTPATIENT)
Dept: BARIATRICS | Facility: CLINIC | Age: 45
End: 2020-10-20

## 2020-10-22 ENCOUNTER — TELEPHONE (OUTPATIENT)
Dept: FAMILY MEDICINE CLINIC | Facility: CLINIC | Age: 45
End: 2020-10-22

## 2020-10-22 DIAGNOSIS — E53.8 B12 DEFICIENCY: ICD-10-CM

## 2020-10-22 RX ORDER — CYANOCOBALAMIN 1000 UG/ML
1000 INJECTION INTRAMUSCULAR; SUBCUTANEOUS
Qty: 3 ML | Refills: 1 | Status: SHIPPED | OUTPATIENT
Start: 2020-10-22 | End: 2020-10-30 | Stop reason: SDUPTHER

## 2020-10-30 DIAGNOSIS — E53.8 B12 DEFICIENCY: ICD-10-CM

## 2020-10-30 RX ORDER — CYANOCOBALAMIN 1000 UG/ML
1000 INJECTION INTRAMUSCULAR; SUBCUTANEOUS
Qty: 3 ML | Refills: 1 | Status: SHIPPED | OUTPATIENT
Start: 2020-10-30 | End: 2020-12-23 | Stop reason: SDUPTHER

## 2020-11-30 ENCOUNTER — OFFICE VISIT (OUTPATIENT)
Dept: FAMILY MEDICINE CLINIC | Facility: CLINIC | Age: 45
End: 2020-11-30
Payer: COMMERCIAL

## 2020-11-30 ENCOUNTER — TELEPHONE (OUTPATIENT)
Dept: ADMINISTRATIVE | Facility: OTHER | Age: 45
End: 2020-11-30

## 2020-11-30 VITALS
WEIGHT: 248 LBS | BODY MASS INDEX: 38.92 KG/M2 | HEIGHT: 67 IN | DIASTOLIC BLOOD PRESSURE: 78 MMHG | TEMPERATURE: 97.1 F | SYSTOLIC BLOOD PRESSURE: 116 MMHG | OXYGEN SATURATION: 98 % | RESPIRATION RATE: 20 BRPM | HEART RATE: 79 BPM

## 2020-11-30 DIAGNOSIS — D50.8 IRON DEFICIENCY ANEMIA SECONDARY TO INADEQUATE DIETARY IRON INTAKE: ICD-10-CM

## 2020-11-30 DIAGNOSIS — E66.01 CLASS 3 SEVERE OBESITY DUE TO EXCESS CALORIES WITH SERIOUS COMORBIDITY AND BODY MASS INDEX (BMI) OF 40.0 TO 44.9 IN ADULT (HCC): Primary | ICD-10-CM

## 2020-11-30 DIAGNOSIS — E55.9 VITAMIN D DEFICIENCY: ICD-10-CM

## 2020-11-30 DIAGNOSIS — E53.8 B12 DEFICIENCY: ICD-10-CM

## 2020-11-30 PROCEDURE — 3078F DIAST BP <80 MM HG: CPT | Performed by: INTERNAL MEDICINE

## 2020-11-30 PROCEDURE — 3008F BODY MASS INDEX DOCD: CPT | Performed by: INTERNAL MEDICINE

## 2020-11-30 PROCEDURE — 99214 OFFICE O/P EST MOD 30 MIN: CPT | Performed by: INTERNAL MEDICINE

## 2020-11-30 PROCEDURE — 3725F SCREEN DEPRESSION PERFORMED: CPT | Performed by: INTERNAL MEDICINE

## 2020-11-30 PROCEDURE — 3074F SYST BP LT 130 MM HG: CPT | Performed by: INTERNAL MEDICINE

## 2020-11-30 PROCEDURE — 1036F TOBACCO NON-USER: CPT | Performed by: INTERNAL MEDICINE

## 2020-11-30 RX ORDER — TOPIRAMATE 25 MG/1
25 CAPSULE, EXTENDED RELEASE ORAL
Qty: 30 CAPSULE | Refills: 1 | Status: SHIPPED | OUTPATIENT
Start: 2020-11-30 | End: 2021-03-08

## 2020-11-30 RX ORDER — PHENTERMINE HYDROCHLORIDE 15 MG/1
15 CAPSULE ORAL EVERY MORNING
Qty: 30 CAPSULE | Refills: 1 | Status: SHIPPED | OUTPATIENT
Start: 2020-11-30 | End: 2021-03-08

## 2020-12-03 ENCOUNTER — PATIENT MESSAGE (OUTPATIENT)
Dept: FAMILY MEDICINE CLINIC | Facility: CLINIC | Age: 45
End: 2020-12-03

## 2020-12-03 DIAGNOSIS — I10 ESSENTIAL HYPERTENSION: ICD-10-CM

## 2020-12-03 RX ORDER — METOPROLOL SUCCINATE 25 MG/1
25 TABLET, EXTENDED RELEASE ORAL DAILY
Qty: 90 TABLET | Refills: 1 | Status: SHIPPED | OUTPATIENT
Start: 2020-12-03 | End: 2021-06-02 | Stop reason: SDUPTHER

## 2020-12-14 DIAGNOSIS — E55.9 VITAMIN D DEFICIENCY: ICD-10-CM

## 2020-12-14 RX ORDER — ERGOCALCIFEROL 1.25 MG/1
50000 CAPSULE ORAL WEEKLY
Qty: 6 CAPSULE | Refills: 0 | Status: SHIPPED | OUTPATIENT
Start: 2020-12-14 | End: 2020-12-23 | Stop reason: SDUPTHER

## 2020-12-23 ENCOUNTER — LAB (OUTPATIENT)
Dept: LAB | Facility: CLINIC | Age: 45
End: 2020-12-23
Payer: COMMERCIAL

## 2020-12-23 ENCOUNTER — TELEMEDICINE (OUTPATIENT)
Dept: FAMILY MEDICINE CLINIC | Facility: CLINIC | Age: 45
End: 2020-12-23
Payer: COMMERCIAL

## 2020-12-23 DIAGNOSIS — E53.8 B12 DEFICIENCY: ICD-10-CM

## 2020-12-23 DIAGNOSIS — J02.9 SORE THROAT: Primary | ICD-10-CM

## 2020-12-23 DIAGNOSIS — E55.9 VITAMIN D DEFICIENCY: ICD-10-CM

## 2020-12-23 DIAGNOSIS — D50.8 IRON DEFICIENCY ANEMIA SECONDARY TO INADEQUATE DIETARY IRON INTAKE: ICD-10-CM

## 2020-12-23 LAB
25(OH)D3 SERPL-MCNC: 29.4 NG/ML (ref 30–100)
BASOPHILS # BLD AUTO: 0.07 THOUSANDS/ΜL (ref 0–0.1)
BASOPHILS NFR BLD AUTO: 1 % (ref 0–1)
EOSINOPHIL # BLD AUTO: 0.5 THOUSAND/ΜL (ref 0–0.61)
EOSINOPHIL NFR BLD AUTO: 7 % (ref 0–6)
ERYTHROCYTE [DISTWIDTH] IN BLOOD BY AUTOMATED COUNT: 15.9 % (ref 11.6–15.1)
FERRITIN SERPL-MCNC: 11 NG/ML (ref 8–388)
HCT VFR BLD AUTO: 42.6 % (ref 34.8–46.1)
HGB BLD-MCNC: 13.5 G/DL (ref 11.5–15.4)
IMM GRANULOCYTES # BLD AUTO: 0.01 THOUSAND/UL (ref 0–0.2)
IMM GRANULOCYTES NFR BLD AUTO: 0 % (ref 0–2)
IRON SATN MFR SERPL: 43 %
IRON SERPL-MCNC: 189 UG/DL (ref 50–170)
LYMPHOCYTES # BLD AUTO: 2.43 THOUSANDS/ΜL (ref 0.6–4.47)
LYMPHOCYTES NFR BLD AUTO: 36 % (ref 14–44)
MCH RBC QN AUTO: 26.2 PG (ref 26.8–34.3)
MCHC RBC AUTO-ENTMCNC: 31.7 G/DL (ref 31.4–37.4)
MCV RBC AUTO: 83 FL (ref 82–98)
MONOCYTES # BLD AUTO: 0.45 THOUSAND/ΜL (ref 0.17–1.22)
MONOCYTES NFR BLD AUTO: 7 % (ref 4–12)
NEUTROPHILS # BLD AUTO: 3.28 THOUSANDS/ΜL (ref 1.85–7.62)
NEUTS SEG NFR BLD AUTO: 49 % (ref 43–75)
NRBC BLD AUTO-RTO: 0 /100 WBCS
PLATELET # BLD AUTO: 340 THOUSANDS/UL (ref 149–390)
PMV BLD AUTO: 9.8 FL (ref 8.9–12.7)
RBC # BLD AUTO: 5.16 MILLION/UL (ref 3.81–5.12)
S PYO AG THROAT QL: NEGATIVE
TIBC SERPL-MCNC: 435 UG/DL (ref 250–450)
VIT B12 SERPL-MCNC: 243 PG/ML (ref 100–900)
WBC # BLD AUTO: 6.74 THOUSAND/UL (ref 4.31–10.16)

## 2020-12-23 PROCEDURE — 83540 ASSAY OF IRON: CPT

## 2020-12-23 PROCEDURE — 36415 COLL VENOUS BLD VENIPUNCTURE: CPT

## 2020-12-23 PROCEDURE — 99213 OFFICE O/P EST LOW 20 MIN: CPT | Performed by: INTERNAL MEDICINE

## 2020-12-23 PROCEDURE — 85025 COMPLETE CBC W/AUTO DIFF WBC: CPT

## 2020-12-23 PROCEDURE — 82728 ASSAY OF FERRITIN: CPT

## 2020-12-23 PROCEDURE — 83550 IRON BINDING TEST: CPT

## 2020-12-23 PROCEDURE — 82306 VITAMIN D 25 HYDROXY: CPT

## 2020-12-23 PROCEDURE — 87070 CULTURE OTHR SPECIMN AEROBIC: CPT | Performed by: INTERNAL MEDICINE

## 2020-12-23 PROCEDURE — 82607 VITAMIN B-12: CPT

## 2020-12-23 PROCEDURE — 87880 STREP A ASSAY W/OPTIC: CPT | Performed by: INTERNAL MEDICINE

## 2020-12-23 RX ORDER — CYANOCOBALAMIN 1000 UG/ML
1000 INJECTION INTRAMUSCULAR; SUBCUTANEOUS WEEKLY
Qty: 12 ML | Refills: 1 | Status: SHIPPED | OUTPATIENT
Start: 2020-12-23 | End: 2021-01-04

## 2020-12-23 RX ORDER — ERGOCALCIFEROL 1.25 MG/1
50000 CAPSULE ORAL WEEKLY
Qty: 12 CAPSULE | Refills: 1 | Status: SHIPPED | OUTPATIENT
Start: 2020-12-23 | End: 2021-03-08

## 2020-12-23 RX ORDER — AMOXICILLIN AND CLAVULANATE POTASSIUM 875; 125 MG/1; MG/1
1 TABLET, FILM COATED ORAL EVERY 12 HOURS SCHEDULED
Qty: 20 TABLET | Refills: 0 | Status: SHIPPED | OUTPATIENT
Start: 2020-12-23 | End: 2021-01-02

## 2020-12-25 LAB — BACTERIA THROAT CULT: NORMAL

## 2020-12-28 DIAGNOSIS — Z98.84 HISTORY OF ROUX-EN-Y GASTRIC BYPASS: ICD-10-CM

## 2020-12-28 RX ORDER — LANOLIN ALCOHOL/MO/W.PET/CERES
400 CREAM (GRAM) TOPICAL DAILY
Qty: 30 TABLET | Refills: 3 | Status: SHIPPED | OUTPATIENT
Start: 2020-12-28 | End: 2021-03-22 | Stop reason: SDUPTHER

## 2020-12-29 ENCOUNTER — PATIENT MESSAGE (OUTPATIENT)
Dept: FAMILY MEDICINE CLINIC | Facility: CLINIC | Age: 45
End: 2020-12-29

## 2020-12-29 DIAGNOSIS — B34.9 VIRAL INFECTION, UNSPECIFIED: ICD-10-CM

## 2020-12-29 DIAGNOSIS — J31.2 SORE THROAT, CHRONIC: Primary | ICD-10-CM

## 2020-12-29 DIAGNOSIS — Z20.822 EXPOSURE TO COVID-19 VIRUS: ICD-10-CM

## 2020-12-29 PROCEDURE — U0003 INFECTIOUS AGENT DETECTION BY NUCLEIC ACID (DNA OR RNA); SEVERE ACUTE RESPIRATORY SYNDROME CORONAVIRUS 2 (SARS-COV-2) (CORONAVIRUS DISEASE [COVID-19]), AMPLIFIED PROBE TECHNIQUE, MAKING USE OF HIGH THROUGHPUT TECHNOLOGIES AS DESCRIBED BY CMS-2020-01-R: HCPCS | Performed by: INTERNAL MEDICINE

## 2020-12-31 DIAGNOSIS — J02.9 SORE THROAT: Primary | ICD-10-CM

## 2020-12-31 LAB — SARS-COV-2 RNA SPEC QL NAA+PROBE: NOT DETECTED

## 2021-01-04 DIAGNOSIS — E53.8 B12 DEFICIENCY: ICD-10-CM

## 2021-01-04 RX ORDER — CYANOCOBALAMIN 1000 UG/ML
1000 INJECTION INTRAMUSCULAR; SUBCUTANEOUS WEEKLY
Qty: 12 ML | Refills: 1 | Status: SHIPPED | OUTPATIENT
Start: 2021-01-04 | End: 2021-11-05

## 2021-01-07 ENCOUNTER — IMMUNIZATIONS (OUTPATIENT)
Dept: FAMILY MEDICINE CLINIC | Facility: HOSPITAL | Age: 46
End: 2021-01-07

## 2021-01-07 DIAGNOSIS — Z23 ENCOUNTER FOR IMMUNIZATION: ICD-10-CM

## 2021-01-07 PROCEDURE — 0011A SARS-COV-2 / COVID-19 MRNA VACCINE (MODERNA) 100 MCG: CPT

## 2021-01-07 PROCEDURE — 91301 SARS-COV-2 / COVID-19 MRNA VACCINE (MODERNA) 100 MCG: CPT

## 2021-01-14 DIAGNOSIS — Z23 ENCOUNTER FOR IMMUNIZATION: ICD-10-CM

## 2021-02-03 ENCOUNTER — IMMUNIZATIONS (OUTPATIENT)
Dept: FAMILY MEDICINE CLINIC | Facility: HOSPITAL | Age: 46
End: 2021-02-03

## 2021-02-03 DIAGNOSIS — Z23 ENCOUNTER FOR IMMUNIZATION: Primary | ICD-10-CM

## 2021-02-03 PROCEDURE — 91301 SARS-COV-2 / COVID-19 MRNA VACCINE (MODERNA) 100 MCG: CPT

## 2021-02-03 PROCEDURE — 0012A SARS-COV-2 / COVID-19 MRNA VACCINE (MODERNA) 100 MCG: CPT

## 2021-02-24 DIAGNOSIS — G89.29 CHRONIC LOW BACK PAIN, UNSPECIFIED BACK PAIN LATERALITY, UNSPECIFIED WHETHER SCIATICA PRESENT: ICD-10-CM

## 2021-02-24 DIAGNOSIS — M54.50 CHRONIC LOW BACK PAIN, UNSPECIFIED BACK PAIN LATERALITY, UNSPECIFIED WHETHER SCIATICA PRESENT: ICD-10-CM

## 2021-02-24 RX ORDER — DULOXETIN HYDROCHLORIDE 60 MG/1
120 CAPSULE, DELAYED RELEASE ORAL DAILY
Qty: 180 CAPSULE | Refills: 1 | Status: SHIPPED | OUTPATIENT
Start: 2021-02-24 | End: 2021-10-05 | Stop reason: SDUPTHER

## 2021-02-24 NOTE — TELEPHONE ENCOUNTER
----- Message from Fe Akins sent at 2/24/2021 11:34 AM EST -----  Regarding: Prescription Question  Contact: [phone]  Can you call in duloxetine 120 mg daily, 90 day supply to [facility] in [place]  Thank you!

## 2021-03-08 ENCOUNTER — OFFICE VISIT (OUTPATIENT)
Dept: FAMILY MEDICINE CLINIC | Facility: CLINIC | Age: 46
End: 2021-03-08
Payer: COMMERCIAL

## 2021-03-08 VITALS
RESPIRATION RATE: 18 BRPM | TEMPERATURE: 98.8 F | SYSTOLIC BLOOD PRESSURE: 130 MMHG | HEIGHT: 67 IN | HEART RATE: 94 BPM | WEIGHT: 256.2 LBS | BODY MASS INDEX: 40.21 KG/M2 | DIASTOLIC BLOOD PRESSURE: 78 MMHG | OXYGEN SATURATION: 100 %

## 2021-03-08 DIAGNOSIS — E55.9 VITAMIN D DEFICIENCY: ICD-10-CM

## 2021-03-08 DIAGNOSIS — D50.8 IRON DEFICIENCY ANEMIA SECONDARY TO INADEQUATE DIETARY IRON INTAKE: ICD-10-CM

## 2021-03-08 DIAGNOSIS — M25.50 POLYARTHRALGIA: ICD-10-CM

## 2021-03-08 DIAGNOSIS — E53.8 B12 DEFICIENCY: ICD-10-CM

## 2021-03-08 DIAGNOSIS — I10 ESSENTIAL HYPERTENSION: ICD-10-CM

## 2021-03-08 DIAGNOSIS — R53.82 CHRONIC FATIGUE: ICD-10-CM

## 2021-03-08 DIAGNOSIS — R73.03 PREDIABETES: ICD-10-CM

## 2021-03-08 DIAGNOSIS — E66.01 CLASS 3 SEVERE OBESITY DUE TO EXCESS CALORIES WITH SERIOUS COMORBIDITY AND BODY MASS INDEX (BMI) OF 40.0 TO 44.9 IN ADULT (HCC): Primary | ICD-10-CM

## 2021-03-08 PROCEDURE — 99214 OFFICE O/P EST MOD 30 MIN: CPT | Performed by: INTERNAL MEDICINE

## 2021-03-08 PROCEDURE — 3075F SYST BP GE 130 - 139MM HG: CPT | Performed by: INTERNAL MEDICINE

## 2021-03-08 PROCEDURE — 3078F DIAST BP <80 MM HG: CPT | Performed by: INTERNAL MEDICINE

## 2021-03-08 PROCEDURE — 1036F TOBACCO NON-USER: CPT | Performed by: INTERNAL MEDICINE

## 2021-03-08 PROCEDURE — 3725F SCREEN DEPRESSION PERFORMED: CPT | Performed by: INTERNAL MEDICINE

## 2021-03-08 PROCEDURE — 3008F BODY MASS INDEX DOCD: CPT | Performed by: INTERNAL MEDICINE

## 2021-03-08 NOTE — PROGRESS NOTES
[facility]        NAME: Fede Alexander is a 39 y o  female  : 1975    MRN: [id number]  DATE: March 10, 2021  TIME: 11:28 AM    Assessment and Plan   1  Class 3 severe obesity due to excess calories with serious comorbidity and body mass index (BMI) of 40 0 to 44 9 in [facility])  - she's lost 3 lbs since last visit, also discussed possible CHRISTIANO contributing to fatigue, will refer for repeat polysomnogram      -  Ambulatory referral to Sleep Medicine; Future    2  Prediabetes  - A1c 6 5 in 2020  Repeat prior to follow up    -   HEMOGLOBIN A1C W/ EAG ESTIMATION; Future    3  Vitamin D deficiency  - vitamin D levels improved, now 29  Stopped high-dose D2, should be taking D3 1,000 units daily    4  Iron deficiency anemia secondary to inadequate dietary iron intake  -    Ferritin still low at 11 in December, Hgb normal  Repeat prior to follow up  She has history of gastric bypass  -  CBC and differential; Future  -     Iron Panel (Includes Ferritin, Iron Sat%, Iron, and TIBC); Future    5  B12 deficiency  - B12 remains below goal at 243, continue IM supplementation    6  Essential hypertension  -     Comprehensive metabolic panel; Future    7  Polyarthralgia  - symmetric, both small and large joints affected but also worse with activity, no morning stiffness  Will check RF, inflammatory markers  Suspect possible OA or FMS  -     RF Screen w/ Reflex to Titer; Future  -     Sedimentation rate, automated; Future  -     C-reactive protein; Future    8  Chronic fatigue  - multifactorial, hasn't improved much with repleting iron stores, anemia correction, vitamin D deficiency, will evaluate for CHRISTIANO  -     Ambulatory referral to Sleep Medicine; Future             Chief Complaint     Chief Complaint   Patient presents with    Follow-up         History of Present Illness       Here for follow up  Still feels very tired  Taking FeSO4, IM B12 and vitamin D   Reports that she did have a sleep study previously for CHRISTIANO but it has been a while  Lives alone, so she doesn't know if she snores or has apneic episodes  Reports that she's not taking phentermine or topiramate for weight loss  Has started walking again since weather is nice  Reports a lot of pain and stiffness in hands, knees and elbows  Not typical for her fibromyalgia, more joint related  Pain is usually at the end of the day and at night  Denies prolonged morning stiffness  Review of Systems   Review of Systems   Constitutional: Positive for fatigue  Negative for appetite change, chills, fever and unexpected weight change  Respiratory: Negative for cough, chest tightness and shortness of breath  Cardiovascular: Negative for chest pain, palpitations and leg swelling  Gastrointestinal: Negative for abdominal pain, constipation, diarrhea, nausea and vomiting  Musculoskeletal: Positive for arthralgias, back pain and myalgias           Current Medications       Current Outpatient Medications:     ascorbic acid (VITAMIN C) 250 mg tablet, Take 1 tablet (250 mg total) by mouth daily With FeSO4, Disp: 90 tablet, Rfl: 1    azelastine (ASTELIN) 0 1 % nasal spray, 1 spray into each nostril 2 (two) times a day, Disp: 1 Bottle, Rfl: 11    cetirizine (ZyrTEC) 10 mg tablet, , Disp: , Rfl:     cyanocobalamin 1,000 mcg/mL, INJECT 1 ML (1,000 MCG TOTAL) INTO A MUSCLE ONCE A WEEK, Disp: 12 mL, Rfl: 1    DULoxetine (CYMBALTA) 60 mg delayed release capsule, Take 2 capsules (120 mg total) by mouth daily, Disp: 180 capsule, Rfl: 1    ferrous sulfate 324 (65 Fe) mg, Take 2-3 tabs every other day with orange juice or vitamin C, Disp: 90 tablet, Rfl: 1    folic acid (FOLVITE) 370 mcg tablet, Take 1 tablet (400 mcg total) by mouth daily, Disp: 30 tablet, Rfl: 3    metoprolol succinate (TOPROL-XL) 25 mg 24 hr tablet, Take 1 tablet (25 mg total) by mouth daily, Disp: 90 tablet, Rfl: 1    ondansetron (ZOFRAN) 8 mg tablet, 1 tab(s), Disp: , Rfl:   pantoprazole (PROTONIX) 40 mg tablet, Take 1 tablet (40 mg total) by mouth daily, Disp: 90 tablet, Rfl: 1    Current Allergies     Allergies as of 03/08/2021 - Reviewed 03/08/2021   Allergen Reaction Noted    Bee pollen  09/29/2016    Pollen extract  09/29/2016            The following portions of the patient's history were reviewed and updated as appropriate: allergies, current medications, past family history, past medical history, past social history, past surgical history and problem list      Past Medical History:   Diagnosis Date    Chronic back pain     secondary to spinal stenosis    Environmental allergies     GERD (gastroesophageal reflux disease)     Hypertension     Spinal stenosis        Past Surgical History:   Procedure Laterality Date    GASTRIC BYPASS  2011       Family History   Problem Relation Age of Onset    Diabetes Mother     Hypertension Mother     Diabetes Father     Hypertension Father     Heart disease Father          Medications have been verified  Objective   /78   Pulse 94   Temp 98 8 °F (37 1 °C)   Resp 18   Ht 5' 7" (1 702 m)   Wt 116 kg (256 lb 3 2 oz)   SpO2 100%   BMI 40 13 kg/m²        Physical Exam     Physical Exam  Vitals signs reviewed  Constitutional:       General: She is not in acute distress  Appearance: Normal appearance  She is obese  Cardiovascular:      Rate and Rhythm: Normal rate and regular rhythm  Heart sounds: S1 normal and S2 normal  No murmur  No friction rub  No gallop  Pulmonary:      Effort: Pulmonary effort is normal  No accessory muscle usage  Breath sounds: Normal breath sounds  No wheezing, rhonchi or rales  Musculoskeletal:      Right lower leg: No edema  Left lower leg: No edema  Comments: No tenderness of MCPs, DIPs or PIPs, wrists  No crepitus or bony deformity  Neurological:      General: No focal deficit present  Mental Status: She is alert     Psychiatric:         Mood and Affect: Mood and affect normal          Speech: Speech normal          Behavior: Behavior normal  Behavior is cooperative  Thought Content:  Thought content normal              Results:  Lab Results   Component Value Date    SODIUM 138 06/05/2020    K 4 1 06/05/2020     06/05/2020    CO2 23 06/05/2020    BUN 11 06/05/2020    CREATININE 0 66 06/05/2020    CALCIUM 8 7 06/05/2020       Lab Results   Component Value Date    HGBA1C 6 5 (H) 06/05/2020       Lab Results   Component Value Date    WBC 6 74 12/23/2020    HGB 13 5 12/23/2020    HCT 42 6 12/23/2020    MCV 83 12/23/2020     12/23/2020

## 2021-03-18 ENCOUNTER — PATIENT MESSAGE (OUTPATIENT)
Dept: FAMILY MEDICINE CLINIC | Facility: CLINIC | Age: 46
End: 2021-03-18

## 2021-03-18 DIAGNOSIS — L03.032 PARONYCHIA OF GREAT TOE, LEFT: Primary | ICD-10-CM

## 2021-03-18 RX ORDER — CEPHALEXIN 500 MG/1
500 CAPSULE ORAL EVERY 8 HOURS SCHEDULED
Qty: 15 CAPSULE | Refills: 0 | Status: SHIPPED | OUTPATIENT
Start: 2021-03-18 | End: 2021-03-23

## 2021-03-22 DIAGNOSIS — Z98.84 HISTORY OF ROUX-EN-Y GASTRIC BYPASS: ICD-10-CM

## 2021-03-22 DIAGNOSIS — M54.50 CHRONIC LOW BACK PAIN, UNSPECIFIED BACK PAIN LATERALITY, UNSPECIFIED WHETHER SCIATICA PRESENT: ICD-10-CM

## 2021-03-22 DIAGNOSIS — G89.29 CHRONIC LOW BACK PAIN, UNSPECIFIED BACK PAIN LATERALITY, UNSPECIFIED WHETHER SCIATICA PRESENT: ICD-10-CM

## 2021-03-22 RX ORDER — LANOLIN ALCOHOL/MO/W.PET/CERES
400 CREAM (GRAM) TOPICAL DAILY
Qty: 30 TABLET | Refills: 3 | Status: SHIPPED | OUTPATIENT
Start: 2021-03-22 | End: 2021-06-17 | Stop reason: SDUPTHER

## 2021-03-22 RX ORDER — DULOXETIN HYDROCHLORIDE 60 MG/1
120 CAPSULE, DELAYED RELEASE ORAL DAILY
Qty: 180 CAPSULE | Refills: 0 | Status: CANCELLED | OUTPATIENT
Start: 2021-03-22

## 2021-03-22 NOTE — TELEPHONE ENCOUNTER
Patient requesting refill(s) of: Cymbalta 60 mg take 2 caps PO QD    Last filled: 2/24/21 #180 x 1  Last appt:3/8/21  Next appt:6/9/21  [facility]

## 2021-03-22 NOTE — TELEPHONE ENCOUNTER
Patient requesting refill(s) of: Folic Acid 903 mcg take one tab PO QD    Last filled: 12/28/2020 #30 x 3  Last appt:3/8/21  Next appt:6/9/21  Pharmacy: [facility]

## 2021-03-22 NOTE — TELEPHONE ENCOUNTER
Pt was given a 3 month supply with one refill one month ago  She should have medication left  Called and lm for pt to call the office back to discuss

## 2021-03-25 DIAGNOSIS — L03.032 PARONYCHIA OF GREAT TOE, LEFT: Primary | ICD-10-CM

## 2021-03-25 RX ORDER — AMOXICILLIN AND CLAVULANATE POTASSIUM 875; 125 MG/1; MG/1
1 TABLET, FILM COATED ORAL EVERY 12 HOURS SCHEDULED
Qty: 8 TABLET | Refills: 0 | Status: SHIPPED | OUTPATIENT
Start: 2021-03-25 | End: 2021-03-29

## 2021-03-26 NOTE — TELEPHONE ENCOUNTER
Called and spoke with pharmacy  They state the pt did not  medication  They had it there for two weeks and she never came  Will call pt to inform

## 2021-03-29 NOTE — TELEPHONE ENCOUNTER
Called and left detailed message with patient informing her that Rx has been at pharmacy  Asked for a return call to confirm that she received message

## 2021-04-26 DIAGNOSIS — K21.9 GASTROESOPHAGEAL REFLUX DISEASE, UNSPECIFIED WHETHER ESOPHAGITIS PRESENT: ICD-10-CM

## 2021-04-26 NOTE — TELEPHONE ENCOUNTER
Patient requesting refill(s) of: Pantoprazole     Last filled: 10/6/20 90 tablets 1refill  Last appt: 3/8/21  Next appt: 6/9/21  [facility]

## 2021-04-27 ENCOUNTER — PATIENT MESSAGE (OUTPATIENT)
Dept: FAMILY MEDICINE CLINIC | Facility: CLINIC | Age: 46
End: 2021-04-27

## 2021-04-27 DIAGNOSIS — L03.039 PARONYCHIA OF GREAT TOE: Primary | ICD-10-CM

## 2021-04-27 RX ORDER — CHLORHEXIDINE GLUCONATE 4 G/100ML
1 SOLUTION TOPICAL DAILY PRN
Qty: 120 ML | Refills: 0 | Status: SHIPPED | OUTPATIENT
Start: 2021-04-27 | End: 2021-09-15

## 2021-04-27 RX ORDER — PANTOPRAZOLE SODIUM 40 MG/1
40 TABLET, DELAYED RELEASE ORAL DAILY
Qty: 90 TABLET | Refills: 1 | Status: SHIPPED | OUTPATIENT
Start: 2021-04-27 | End: 2021-06-04 | Stop reason: SDUPTHER

## 2021-06-02 DIAGNOSIS — I10 ESSENTIAL HYPERTENSION: ICD-10-CM

## 2021-06-02 RX ORDER — METOPROLOL SUCCINATE 25 MG/1
25 TABLET, EXTENDED RELEASE ORAL DAILY
Qty: 90 TABLET | Refills: 0 | Status: SHIPPED | OUTPATIENT
Start: 2021-06-02 | End: 2021-08-31

## 2021-06-02 NOTE — TELEPHONE ENCOUNTER
Patient requesting refill(s) of: Metoprolol Succ ER 25mg tab    Last filled: 12/3/20  Last appt:  3/8/21  Next appt:  6/16/21  [facility]: [facility] [place]

## 2021-06-04 DIAGNOSIS — K21.9 GASTROESOPHAGEAL REFLUX DISEASE, UNSPECIFIED WHETHER ESOPHAGITIS PRESENT: ICD-10-CM

## 2021-06-04 RX ORDER — PANTOPRAZOLE SODIUM 40 MG/1
40 TABLET, DELAYED RELEASE ORAL DAILY
Qty: 90 TABLET | Refills: 1 | Status: SHIPPED | OUTPATIENT
Start: 2021-06-04 | End: 2021-11-05

## 2021-06-04 NOTE — TELEPHONE ENCOUNTER
Patient requesting refill(s) of:pantoprazole (PROTONIX) 40 mg tablet    Last filled:4/27/21  Last appt:3/8/21  Next appt:6/16/21  Pharmacy:  [facility] [address] [place] [address] [id number] [place] [address]

## 2021-06-07 ENCOUNTER — OFFICE VISIT (OUTPATIENT)
Dept: OBGYN CLINIC | Facility: CLINIC | Age: 46
End: 2021-06-07
Payer: COMMERCIAL

## 2021-06-07 VITALS
SYSTOLIC BLOOD PRESSURE: 126 MMHG | WEIGHT: 256.6 LBS | HEIGHT: 67 IN | BODY MASS INDEX: 40.27 KG/M2 | DIASTOLIC BLOOD PRESSURE: 78 MMHG

## 2021-06-07 DIAGNOSIS — Z01.419 ROUTINE GYNECOLOGICAL EXAMINATION: Primary | ICD-10-CM

## 2021-06-07 DIAGNOSIS — Z12.4 SCREENING FOR MALIGNANT NEOPLASM OF CERVIX: ICD-10-CM

## 2021-06-07 PROCEDURE — G0145 SCR C/V CYTO,THINLAYER,RESCR: HCPCS | Performed by: PATHOLOGY

## 2021-06-07 PROCEDURE — S0610 ANNUAL GYNECOLOGICAL EXAMINA: HCPCS | Performed by: ADVANCED PRACTICE MIDWIFE

## 2021-06-07 PROCEDURE — 87624 HPV HI-RISK TYP POOLED RSLT: CPT | Performed by: ADVANCED PRACTICE MIDWIFE

## 2021-06-07 PROCEDURE — G0124 SCREEN C/V THIN LAYER BY MD: HCPCS | Performed by: PATHOLOGY

## 2021-06-07 NOTE — PROGRESS NOTES
[facility] [address], [place]    ASSESSMENT/PLAN: Imelda Truong is a 39 y o  No obstetric history on file  who presents for annual gynecologic exam     Encounter for routine gynecologic examination  - Routine well woman exam completed today  - Cervical Cancer Screening: Current ASCCP Guidelines reviewed  Last Pap: 06/07/2021 Denies hx of abnormal pap smears   - HPV Vaccination status: unknown  - STI screening offered including HIV: not indicated based on hx  - Contraceptive counseling discussed  Current contraception: none:   - Breast Cancer Screening: Last Mammogram Not on file, script given by PCP  Last mammogram 2019  - RTO 1 yr    Additional problems addressed at this visit:  1  Routine gynecological examination  -     Liquid-based pap, screening    2  Screening for malignant neoplasm of cervix  -     Liquid-based pap, screening          CC: Annual Gynecologic Examination    HPI: Imelda Truong is a 39 y o  No obstetric history on file  who presents for annual gynecologic examination  Shelley Piedra presents today for gyn exam  Her cycles are monthly and lasting 5 days  She had 1 episode of spotting between April and May, but otherwise cycles unchanged  Notes increased stressors- working, taking masters program in nursing education, mother diagnosed with uterine ca at age 68  Shelley Piedra is not sexually active  Sleep pattern is interrupted by need for void and by her dogs, but notes 6-10 hours daily, good intake of calcium rich foods, walks dogs daily and up until recently walked on treadmill 2-3 miles per day  32oz of ice tea daily  Performs regular SBE         The following portions of the patient's history were reviewed and updated as appropriate: allergies, current medications, past family history, past medical history, obstetric history, gynecologic history, past social history, past surgical history and problem list     Review of Systems   Constitutional: Negative for activity change, appetite change, fatigue and fever  Respiratory: Negative for cough and shortness of breath  Cardiovascular: Negative for chest pain, palpitations and leg swelling  Gastrointestinal: Negative for constipation and diarrhea  Genitourinary: Negative for difficulty urinating, frequency, menstrual problem, pelvic pain, vaginal bleeding, vaginal discharge and vaginal pain  Neurological: Negative for light-headedness and headaches  Psychiatric/Behavioral: The patient is not nervous/anxious  Objective:  /78 (BP Location: Right arm, Patient Position: Sitting, Cuff Size: Large)   Ht 5' 7" (1 702 m)   Wt 116 kg (256 lb 9 6 oz)   LMP 05/17/2021 (Exact Date)   BMI 40 19 kg/m²    Physical Exam  Vitals signs reviewed  Constitutional:       Appearance: Normal appearance  HENT:      Head: Normocephalic  Neck:      Musculoskeletal: Normal range of motion  Thyroid: No thyroid mass or thyroid tenderness  Cardiovascular:      Rate and Rhythm: Normal rate and regular rhythm  Heart sounds: Normal heart sounds  Pulmonary:      Effort: Pulmonary effort is normal       Breath sounds: Normal breath sounds  Chest:      Breasts:         Right: No mass, nipple discharge, skin change or tenderness  Left: No mass, nipple discharge, skin change or tenderness  Abdominal:      General: There is no distension  Palpations: There is no mass  Tenderness: There is no abdominal tenderness  There is no guarding  Genitourinary:     General: Normal vulva  Exam position: Lithotomy position  Labia:         Right: No tenderness or lesion  Left: No tenderness or lesion  Vagina: No vaginal discharge, tenderness, bleeding or lesions  Cervix: No discharge, lesion, erythema or cervical bleeding  Uterus: Normal  Not enlarged and not tender  Adnexa:         Right: No mass, tenderness or fullness  Left: No mass, tenderness or fullness  Lymphadenopathy:      Upper Body:      Right upper body: No axillary adenopathy  Left upper body: No axillary adenopathy  Skin:     General: Skin is warm and dry  Neurological:      Mental Status: She is alert and oriented to person, place, and time     Psychiatric:         Mood and Affect: Mood normal          Behavior: Behavior normal          Judgment: Judgment normal              Marilia Stroud CNM  [facility]  06/07/21 4:07 PM

## 2021-06-08 LAB
HPV HR 12 DNA CVX QL NAA+PROBE: NEGATIVE
HPV16 DNA CVX QL NAA+PROBE: NEGATIVE
HPV18 DNA CVX QL NAA+PROBE: NEGATIVE

## 2021-06-11 LAB
LAB AP GYN PRIMARY INTERPRETATION: NORMAL
Lab: NORMAL
PATH INTERP SPEC-IMP: NORMAL

## 2021-06-17 ENCOUNTER — TELEPHONE (OUTPATIENT)
Dept: FAMILY MEDICINE CLINIC | Facility: CLINIC | Age: 46
End: 2021-06-17

## 2021-06-17 DIAGNOSIS — Z98.84 HISTORY OF ROUX-EN-Y GASTRIC BYPASS: ICD-10-CM

## 2021-06-17 RX ORDER — LANOLIN ALCOHOL/MO/W.PET/CERES
400 CREAM (GRAM) TOPICAL DAILY
Qty: 30 TABLET | Refills: 3 | Status: SHIPPED | OUTPATIENT
Start: 2021-06-17 | End: 2021-09-13 | Stop reason: SDUPTHER

## 2021-06-17 NOTE — TELEPHONE ENCOUNTER
Patient requesting refill(s) of:  Folic Acid (847 MCG)    Last filled:3/22/21  Last appt:3/8/21  Next appt:6/29/21  Ph[id number] ,  [id number]

## 2021-07-31 ENCOUNTER — APPOINTMENT (OUTPATIENT)
Dept: LAB | Facility: CLINIC | Age: 46
End: 2021-07-31
Payer: COMMERCIAL

## 2021-07-31 DIAGNOSIS — M25.50 POLYARTHRALGIA: ICD-10-CM

## 2021-07-31 DIAGNOSIS — I10 ESSENTIAL HYPERTENSION: ICD-10-CM

## 2021-07-31 DIAGNOSIS — R73.03 PREDIABETES: ICD-10-CM

## 2021-07-31 DIAGNOSIS — D50.8 IRON DEFICIENCY ANEMIA SECONDARY TO INADEQUATE DIETARY IRON INTAKE: ICD-10-CM

## 2021-07-31 LAB
ALBUMIN SERPL BCP-MCNC: 3.7 G/DL (ref 3.5–5)
ALP SERPL-CCNC: 91 U/L (ref 46–116)
ALT SERPL W P-5'-P-CCNC: 40 U/L (ref 12–78)
ANION GAP SERPL CALCULATED.3IONS-SCNC: 5 MMOL/L (ref 4–13)
AST SERPL W P-5'-P-CCNC: 23 U/L (ref 5–45)
BASOPHILS # BLD AUTO: 0.06 THOUSANDS/ΜL (ref 0–0.1)
BASOPHILS NFR BLD AUTO: 1 % (ref 0–1)
BILIRUB SERPL-MCNC: 0.48 MG/DL (ref 0.2–1)
BUN SERPL-MCNC: 8 MG/DL (ref 5–25)
CALCIUM SERPL-MCNC: 8.8 MG/DL (ref 8.3–10.1)
CHLORIDE SERPL-SCNC: 104 MMOL/L (ref 100–108)
CO2 SERPL-SCNC: 28 MMOL/L (ref 21–32)
CREAT SERPL-MCNC: 0.74 MG/DL (ref 0.6–1.3)
CRP SERPL QL: 4.9 MG/L
EOSINOPHIL # BLD AUTO: 0.51 THOUSAND/ΜL (ref 0–0.61)
EOSINOPHIL NFR BLD AUTO: 6 % (ref 0–6)
ERYTHROCYTE [DISTWIDTH] IN BLOOD BY AUTOMATED COUNT: 13.2 % (ref 11.6–15.1)
ERYTHROCYTE [SEDIMENTATION RATE] IN BLOOD: 8 MM/HOUR (ref 0–19)
EST. AVERAGE GLUCOSE BLD GHB EST-MCNC: 123 MG/DL
FERRITIN SERPL-MCNC: 23 NG/ML (ref 8–388)
GFR SERPL CREATININE-BSD FRML MDRD: 98 ML/MIN/1.73SQ M
GLUCOSE P FAST SERPL-MCNC: 98 MG/DL (ref 65–99)
HBA1C MFR BLD: 5.9 %
HCT VFR BLD AUTO: 45.2 % (ref 34.8–46.1)
HGB BLD-MCNC: 14.6 G/DL (ref 11.5–15.4)
IMM GRANULOCYTES # BLD AUTO: 0.02 THOUSAND/UL (ref 0–0.2)
IMM GRANULOCYTES NFR BLD AUTO: 0 % (ref 0–2)
IRON SATN MFR SERPL: 36 %
IRON SERPL-MCNC: 149 UG/DL (ref 50–170)
LYMPHOCYTES # BLD AUTO: 2.55 THOUSANDS/ΜL (ref 0.6–4.47)
LYMPHOCYTES NFR BLD AUTO: 30 % (ref 14–44)
MCH RBC QN AUTO: 27.8 PG (ref 26.8–34.3)
MCHC RBC AUTO-ENTMCNC: 32.3 G/DL (ref 31.4–37.4)
MCV RBC AUTO: 86 FL (ref 82–98)
MONOCYTES # BLD AUTO: 0.66 THOUSAND/ΜL (ref 0.17–1.22)
MONOCYTES NFR BLD AUTO: 8 % (ref 4–12)
NEUTROPHILS # BLD AUTO: 4.74 THOUSANDS/ΜL (ref 1.85–7.62)
NEUTS SEG NFR BLD AUTO: 55 % (ref 43–75)
NRBC BLD AUTO-RTO: 0 /100 WBCS
PLATELET # BLD AUTO: 288 THOUSANDS/UL (ref 149–390)
PMV BLD AUTO: 10.5 FL (ref 8.9–12.7)
POTASSIUM SERPL-SCNC: 4.1 MMOL/L (ref 3.5–5.3)
PROT SERPL-MCNC: 6.7 G/DL (ref 6.4–8.2)
RBC # BLD AUTO: 5.26 MILLION/UL (ref 3.81–5.12)
SODIUM SERPL-SCNC: 137 MMOL/L (ref 136–145)
TIBC SERPL-MCNC: 411 UG/DL (ref 250–450)
WBC # BLD AUTO: 8.54 THOUSAND/UL (ref 4.31–10.16)

## 2021-07-31 PROCEDURE — 83550 IRON BINDING TEST: CPT

## 2021-07-31 PROCEDURE — 82728 ASSAY OF FERRITIN: CPT

## 2021-07-31 PROCEDURE — 83036 HEMOGLOBIN GLYCOSYLATED A1C: CPT

## 2021-07-31 PROCEDURE — 85025 COMPLETE CBC W/AUTO DIFF WBC: CPT

## 2021-07-31 PROCEDURE — 86140 C-REACTIVE PROTEIN: CPT

## 2021-07-31 PROCEDURE — 86430 RHEUMATOID FACTOR TEST QUAL: CPT

## 2021-07-31 PROCEDURE — 83540 ASSAY OF IRON: CPT

## 2021-07-31 PROCEDURE — 36415 COLL VENOUS BLD VENIPUNCTURE: CPT

## 2021-07-31 PROCEDURE — 85652 RBC SED RATE AUTOMATED: CPT

## 2021-07-31 PROCEDURE — 80053 COMPREHEN METABOLIC PANEL: CPT

## 2021-08-02 LAB — RHEUMATOID FACT SER QL LA: NEGATIVE

## 2021-08-03 ENCOUNTER — PATIENT MESSAGE (OUTPATIENT)
Dept: FAMILY MEDICINE CLINIC | Facility: CLINIC | Age: 46
End: 2021-08-03

## 2021-08-03 DIAGNOSIS — L03.019 PARONYCHIA OF FINGER, UNSPECIFIED LATERALITY: Primary | ICD-10-CM

## 2021-08-03 RX ORDER — AMOXICILLIN AND CLAVULANATE POTASSIUM 875; 125 MG/1; MG/1
1 TABLET, FILM COATED ORAL EVERY 12 HOURS SCHEDULED
Qty: 10 TABLET | Refills: 0 | Status: SHIPPED | OUTPATIENT
Start: 2021-08-03 | End: 2021-08-08

## 2021-08-09 ENCOUNTER — PATIENT MESSAGE (OUTPATIENT)
Dept: FAMILY MEDICINE CLINIC | Facility: CLINIC | Age: 46
End: 2021-08-09

## 2021-08-09 DIAGNOSIS — L03.019 PARONYCHIA OF FINGER, UNSPECIFIED LATERALITY: Primary | ICD-10-CM

## 2021-08-09 RX ORDER — AMOXICILLIN AND CLAVULANATE POTASSIUM 875; 125 MG/1; MG/1
1 TABLET, FILM COATED ORAL EVERY 12 HOURS SCHEDULED
Qty: 10 TABLET | Refills: 0 | Status: SHIPPED | OUTPATIENT
Start: 2021-08-09 | End: 2021-08-14

## 2021-08-09 NOTE — TELEPHONE ENCOUNTER
From: Junior Frank  To: Ellie Lewis MD  Sent: 8/9/2021 11:45 AM EDT  Subject: Non-Urgent Medical Question    Hi Dr Blankenship,  My finger is much better  I think I may need a few more days of antibiotic it is much much smaller still have a little pain when touch it  it's very hard to see In picture but I can send if U want me to! Thanks,   [facility]       ----- Message -----   Josephine Harrison MD    Sent:8/3/2021 2:41 PM EDT   To:Ting Vasques   Subject:RE: Non-Urgent Medical Question    Berry Keating  [facility]  Let me know if it doesn't improve  Thanks    Dr Rosales Cristina      ----- Message -----   From:Ting Vasques   Sent:8/3/2021 12:53 PM EDT   MD Lily Mijares Dr,  I did it again  My finger is sore and inflamed  Can you call in an antibiotic? I think Augmentin worked the last time?    Thanks,   [facility]

## 2021-08-30 DIAGNOSIS — I10 ESSENTIAL HYPERTENSION: ICD-10-CM

## 2021-08-31 RX ORDER — METOPROLOL SUCCINATE 25 MG/1
TABLET, EXTENDED RELEASE ORAL
Qty: 90 TABLET | Refills: 0 | Status: SHIPPED | OUTPATIENT
Start: 2021-08-31 | End: 2021-09-15 | Stop reason: SDUPTHER

## 2021-09-01 ENCOUNTER — PATIENT MESSAGE (OUTPATIENT)
Dept: FAMILY MEDICINE CLINIC | Facility: CLINIC | Age: 46
End: 2021-09-01

## 2021-09-01 NOTE — TELEPHONE ENCOUNTER
From: Carlin Essex  To: Eric Shaw MD  Sent: 9/1/2021 9:17 AM EDT  Subject: Non-Urgent Medical Question    Another photo   Right leg is worse then left

## 2021-09-01 NOTE — TELEPHONE ENCOUNTER
Rose Rendon 9/1/2021 8:38 AM EDT      ----- Message -----  From: Triston Hernandez  Sent: 9/1/2021 8:30 AM EDT  To: Antony [place] [facility]  Subject: Non-Urgent Sadia Umana,  Sorry for missing our appts  I m going with an afternoon appt now!!    I have a rash on both legs  It is from the knee down  I attached pictures  My arms are itchy and red but no visible rash  Nothing anywhere else as of yet  It started yesterday  I took benedryl and my daily Zyrtec  I have applied cortisone 3 x so far  Tanya difficult cause it is so widespread  My bp has been higher then i would like it to be  Systolic is 457'D-067'G diastolic is high 14'G-15'O  Heart rate is 70-80's  I do have edema in my legs by the end of the day  Can we increase my meds and maybe add a diuretic? I have an appt on sept 15   I will be there!!    Thanks   Lea Alejandre

## 2021-09-02 ENCOUNTER — OFFICE VISIT (OUTPATIENT)
Dept: FAMILY MEDICINE CLINIC | Facility: CLINIC | Age: 46
End: 2021-09-02
Payer: COMMERCIAL

## 2021-09-02 VITALS
RESPIRATION RATE: 20 BRPM | DIASTOLIC BLOOD PRESSURE: 70 MMHG | WEIGHT: 260 LBS | HEIGHT: 67 IN | SYSTOLIC BLOOD PRESSURE: 120 MMHG | HEART RATE: 92 BPM | OXYGEN SATURATION: 98 % | BODY MASS INDEX: 40.81 KG/M2 | TEMPERATURE: 98.4 F

## 2021-09-02 DIAGNOSIS — L23.9 ALLERGIC CONTACT DERMATITIS, UNSPECIFIED TRIGGER: Primary | ICD-10-CM

## 2021-09-02 PROCEDURE — 99213 OFFICE O/P EST LOW 20 MIN: CPT | Performed by: NURSE PRACTITIONER

## 2021-09-02 RX ORDER — HYDROCODONE BITARTRATE AND ACETAMINOPHEN 5; 325 MG/1; MG/1
1 TABLET ORAL EVERY 6 HOURS PRN
COMMUNITY
Start: 2021-08-12 | End: 2021-09-15

## 2021-09-02 RX ORDER — TRIAMCINOLONE ACETONIDE 1 MG/G
CREAM TOPICAL 2 TIMES DAILY
Qty: 30 G | Refills: 2 | Status: SHIPPED | OUTPATIENT
Start: 2021-09-02 | End: 2021-12-29

## 2021-09-02 RX ORDER — IBUPROFEN 600 MG/1
TABLET ORAL
COMMUNITY
Start: 2021-08-12 | End: 2021-09-15

## 2021-09-02 RX ORDER — PREDNISONE 20 MG/1
20 TABLET ORAL 2 TIMES DAILY WITH MEALS
Qty: 10 TABLET | Refills: 0 | Status: SHIPPED | OUTPATIENT
Start: 2021-09-02 | End: 2021-09-07

## 2021-09-02 NOTE — PROGRESS NOTES
[facility]        NAME: Perfecto Mariscal is a 39 y o  female  : 1975    MRN: [id number]  DATE: 2021  TIME: 2:47 PM    Assessment and Plan   Allergic contact dermatitis, unspecified trigger [L23 9]  1  Allergic contact dermatitis, unspecified trigger  predniSONE 20 mg tablet    triamcinolone (KENALOG) 0 1 % cream         Patient Instructions     Patient Instructions   Discussed possible allergy options- grass, poison, dogs- etc  She will continue to try to figure this out  Discussed option of repeat allergy testing and if allergy shots are needed her allergist can order serum and we can administer in our office  Prednisone oral as directed  Triamcinolone cream as directed for itch  Continue allergy medication as already taking daily, consider adding Benadryl oral if itching worsens  Call for problems/concerns          Chief Complaint     Chief Complaint   Patient presents with    Rash     Started 2 days ago on arms to everywhere else         History of Present Illness       Here for itchy rash that started 3 days ago  Started on both arms- started as just red, now noticed a rash on both arms, leg, chest, and abdomen  Reports some itching of scalp  Did not change any detergents, new medications (finished Augmentin almost 2 weeks ago)  Was outside on - unsure if exposed to poison  Had allergy testing years ago- told she was allergy to grass, certain trees, hay, cows, dogs, cats- got allergy shots for a while  Was at her parent's house this weekend- they have dogs, but so does she  "nothing out of my ordinary routine"      Review of Systems   Review of Systems   Constitutional: Negative for activity change, diaphoresis, fatigue and fever  HENT: Negative for congestion, facial swelling, hearing loss, rhinorrhea, sinus pressure, sinus pain, sneezing, sore throat and voice change  Eyes: Negative for discharge and visual disturbance     Respiratory: Negative for cough, choking, chest tightness, shortness of breath, wheezing and stridor  Cardiovascular: Negative for chest pain, palpitations and leg swelling  Gastrointestinal: Negative for abdominal distention, abdominal pain, constipation, diarrhea, nausea and vomiting  Endocrine: Negative for polydipsia, polyphagia and polyuria  Genitourinary: Negative for difficulty urinating, dysuria, frequency and urgency  Musculoskeletal: Negative for arthralgias, back pain, gait problem, joint swelling, myalgias, neck pain and neck stiffness  Skin: Positive for rash  Negative for color change and wound  Neurological: Negative for dizziness, syncope, speech difficulty, weakness, light-headedness and headaches  Hematological: Negative for adenopathy  Does not bruise/bleed easily  Psychiatric/Behavioral: Negative for agitation, behavioral problems, confusion, hallucinations, sleep disturbance and suicidal ideas  The patient is not nervous/anxious            Current Medications       Current Outpatient Medications:     ascorbic acid (VITAMIN C) 250 mg tablet, Take 1 tablet (250 mg total) by mouth daily With FeSO4, Disp: 90 tablet, Rfl: 1    cetirizine (ZyrTEC) 10 mg tablet, , Disp: , Rfl:     DULoxetine (CYMBALTA) 60 mg delayed release capsule, Take 2 capsules (120 mg total) by mouth daily, Disp: 180 capsule, Rfl: 1    ferrous sulfate 324 (65 Fe) mg, Take 2-3 tabs every other day with orange juice or vitamin C, Disp: 90 tablet, Rfl: 1    folic acid (FOLVITE) 501 mcg tablet, Take 1 tablet (400 mcg total) by mouth daily, Disp: 30 tablet, Rfl: 3    metoprolol succinate (TOPROL-XL) 25 mg 24 hr tablet, TAKE 1 TABLET BY MOUTH EVERY DAY, Disp: 90 tablet, Rfl: 0    mupirocin (BACTROBAN) 2 % ointment, Apply topically 3 (three) times a day, Disp: 22 g, Rfl: 0    ondansetron (ZOFRAN) 8 mg tablet, 1 tab(s), Disp: , Rfl:     pantoprazole (PROTONIX) 40 mg tablet, Take 1 tablet (40 mg total) by mouth daily, Disp: 90 tablet, Rfl: 1    azelastine (ASTELIN) 0 1 % nasal spray, 1 spray into each nostril 2 (two) times a day (Patient not taking: Reported on 6/7/2021), Disp: 1 Bottle, Rfl: 11    chlorhexidine (HIBICLENS) 4 % external liquid, Apply 1 application topically daily as needed for wound care (Patient not taking: Reported on 6/7/2021), Disp: 120 mL, Rfl: 0    cyanocobalamin 1,000 mcg/mL, INJECT 1 ML (1,000 MCG TOTAL) INTO A MUSCLE ONCE A WEEK, Disp: 12 mL, Rfl: 1    HYDROcodone-acetaminophen (NORCO) 5-325 mg per tablet, Take 1 tablet by mouth every 6 (six) hours as needed, Disp: , Rfl:     ibuprofen (MOTRIN) 600 mg tablet, TAKE 1 TABLET (600 MG) BY ORAL ROUTE EVERY 6 HOURS FOR FIRST 48 HOURS, THEN AS NEEDED FOR PAIN, Disp: , Rfl:     predniSONE 20 mg tablet, Take 1 tablet (20 mg total) by mouth 2 (two) times a day with meals for 5 days, Disp: 10 tablet, Rfl: 0    triamcinolone (KENALOG) 0 1 % cream, Apply topically 2 (two) times a day, Disp: 30 g, Rfl: 2    Current Allergies     Allergies as of 09/02/2021 - Reviewed 09/02/2021   Allergen Reaction Noted    Bee pollen  09/29/2016    Pollen extract  09/29/2016            The following portions of the patient's history were reviewed and updated as appropriate: allergies, current medications, past family history, past medical history, past social history, past surgical history and problem list      Past Medical History:   Diagnosis Date    Chronic back pain     secondary to spinal stenosis    Environmental allergies     GERD (gastroesophageal reflux disease)     Hypertension     Spinal stenosis        Past Surgical History:   Procedure Laterality Date    CHOLECYSTECTOMY      GASTRIC BYPASS  2011       Family History   Problem Relation Age of Onset    Diabetes Mother     Hypertension Mother     Endometrial cancer Mother     Diabetes Father     Hypertension Father     Heart disease Father     Prostate cancer Father          Medications have been verified  Objective   /70   Pulse 92   Temp 98 4 °F (36 9 °C) (Tympanic)   Resp 20   Ht 5' 7" (1 702 m)   Wt 118 kg (260 lb)   SpO2 98%   BMI 40 72 kg/m²        Physical Exam     Physical Exam  Vitals and nursing note reviewed  Constitutional:       General: She is not in acute distress  Appearance: She is well-developed  She is not diaphoretic  Neck:      Thyroid: No thyromegaly  Trachea: No tracheal deviation  Cardiovascular:      Rate and Rhythm: Normal rate and regular rhythm  Heart sounds: Normal heart sounds  No murmur heard  Pulmonary:      Effort: Pulmonary effort is normal  No respiratory distress  Breath sounds: Normal breath sounds  No wheezing  Musculoskeletal:         General: No tenderness or deformity  Normal range of motion  Cervical back: Normal range of motion and neck supple  Skin:     General: Skin is warm and dry  Findings: Rash present  No erythema  Neurological:      Mental Status: She is alert and oriented to person, place, and time  Psychiatric:         Behavior: Behavior normal  Behavior is cooperative  Thought Content:  Thought content normal          Judgment: Judgment normal

## 2021-09-02 NOTE — PATIENT INSTRUCTIONS
Discussed possible allergy options- grass, poison, dogs- etc  She will continue to try to figure this out    Discussed option of repeat allergy testing and if allergy shots are needed her allergist can order serum and we can administer in our office  Prednisone oral as directed  Triamcinolone cream as directed for itch  Continue allergy medication as already taking daily, consider adding Benadryl oral if itching worsens  Call for problems/concerns

## 2021-09-13 DIAGNOSIS — Z98.84 HISTORY OF ROUX-EN-Y GASTRIC BYPASS: ICD-10-CM

## 2021-09-13 RX ORDER — LANOLIN ALCOHOL/MO/W.PET/CERES
400 CREAM (GRAM) TOPICAL DAILY
Qty: 30 TABLET | Refills: 5 | Status: SHIPPED | OUTPATIENT
Start: 2021-09-13 | End: 2022-01-17 | Stop reason: SDUPTHER

## 2021-09-13 NOTE — TELEPHONE ENCOUNTER
Patient requesting refill(s) of: Folic Acid 418VN    Last filled: 6/17/21 #30x3  Last appt:9/2/21  Next appt: 9/15/21  Pharmacy: [facility]

## 2021-09-15 ENCOUNTER — OFFICE VISIT (OUTPATIENT)
Dept: FAMILY MEDICINE CLINIC | Facility: CLINIC | Age: 46
End: 2021-09-15
Payer: COMMERCIAL

## 2021-09-15 VITALS
DIASTOLIC BLOOD PRESSURE: 76 MMHG | HEIGHT: 67 IN | WEIGHT: 260.8 LBS | HEART RATE: 87 BPM | TEMPERATURE: 98.5 F | BODY MASS INDEX: 40.93 KG/M2 | RESPIRATION RATE: 20 BRPM | SYSTOLIC BLOOD PRESSURE: 126 MMHG | OXYGEN SATURATION: 99 %

## 2021-09-15 DIAGNOSIS — K21.9 GASTROESOPHAGEAL REFLUX DISEASE, UNSPECIFIED WHETHER ESOPHAGITIS PRESENT: ICD-10-CM

## 2021-09-15 DIAGNOSIS — Z12.31 ENCOUNTER FOR SCREENING MAMMOGRAM FOR MALIGNANT NEOPLASM OF BREAST: ICD-10-CM

## 2021-09-15 DIAGNOSIS — R31.29 MICROSCOPIC HEMATURIA: ICD-10-CM

## 2021-09-15 DIAGNOSIS — E53.8 B12 DEFICIENCY: ICD-10-CM

## 2021-09-15 DIAGNOSIS — E55.9 VITAMIN D DEFICIENCY: ICD-10-CM

## 2021-09-15 DIAGNOSIS — R10.12 LEFT UPPER QUADRANT ABDOMINAL PAIN: ICD-10-CM

## 2021-09-15 DIAGNOSIS — Z98.84 HISTORY OF ROUX-EN-Y GASTRIC BYPASS: ICD-10-CM

## 2021-09-15 DIAGNOSIS — E66.01 CLASS 3 SEVERE OBESITY DUE TO EXCESS CALORIES WITH SERIOUS COMORBIDITY AND BODY MASS INDEX (BMI) OF 40.0 TO 44.9 IN ADULT (HCC): ICD-10-CM

## 2021-09-15 DIAGNOSIS — Z00.00 ANNUAL PHYSICAL EXAM: Primary | ICD-10-CM

## 2021-09-15 DIAGNOSIS — R73.03 PREDIABETES: ICD-10-CM

## 2021-09-15 DIAGNOSIS — I10 ESSENTIAL HYPERTENSION: ICD-10-CM

## 2021-09-15 PROCEDURE — 99396 PREV VISIT EST AGE 40-64: CPT | Performed by: INTERNAL MEDICINE

## 2021-09-15 PROCEDURE — 99214 OFFICE O/P EST MOD 30 MIN: CPT | Performed by: INTERNAL MEDICINE

## 2021-09-15 PROCEDURE — 3008F BODY MASS INDEX DOCD: CPT | Performed by: INTERNAL MEDICINE

## 2021-09-15 PROCEDURE — 3078F DIAST BP <80 MM HG: CPT | Performed by: INTERNAL MEDICINE

## 2021-09-15 PROCEDURE — 1036F TOBACCO NON-USER: CPT | Performed by: INTERNAL MEDICINE

## 2021-09-15 PROCEDURE — 3725F SCREEN DEPRESSION PERFORMED: CPT | Performed by: INTERNAL MEDICINE

## 2021-09-15 PROCEDURE — 3074F SYST BP LT 130 MM HG: CPT | Performed by: INTERNAL MEDICINE

## 2021-09-15 RX ORDER — METOPROLOL SUCCINATE 50 MG/1
50 TABLET, EXTENDED RELEASE ORAL DAILY
Qty: 30 TABLET | Refills: 5 | Status: SHIPPED | OUTPATIENT
Start: 2021-09-15 | End: 2021-11-26 | Stop reason: SDUPTHER

## 2021-09-15 RX ORDER — ONDANSETRON HYDROCHLORIDE 8 MG/1
8 TABLET, FILM COATED ORAL EVERY 8 HOURS PRN
Qty: 20 TABLET | Refills: 3 | Status: SHIPPED | OUTPATIENT
Start: 2021-09-15

## 2021-09-15 RX ORDER — FAMOTIDINE 40 MG/1
40 TABLET, FILM COATED ORAL
Qty: 30 TABLET | Refills: 1 | Status: SHIPPED | OUTPATIENT
Start: 2021-09-15

## 2021-09-15 NOTE — PROGRESS NOTES
[facility]        NAME: Junior Frank is a 39 y o  female  : 1975    MRN: [id number]  DATE: September 15, 2021  TIME: 4:06 PM    Assessment and Plan   1  Left upper quadrant abdominal pain  -she has history of GERD and gastric bypass, her abdomen is soft and non-tender on exam today  Continue PPI, add pepcid at bedtime, continue zofran prn  Will refer to GI for EGD     -  ondansetron (ZOFRAN) 8 mg tablet; Take 1 tablet (8 mg total) by mouth every 8 (eight) hours as needed for nausea or vomiting  -     Ambulatory referral to Gastroenterology; Future  -     famotidine (PEPCID) 40 MG tablet; Take 1 tablet (40 mg total) by mouth daily at bedtime as needed for heartburn    2  Encounter for screening mammogram for malignant neoplasm of breast  -     Mammo screening bilateral w 3d & cad; Future; Expected date: 09/15/2021    3  Essential hypertension  -Bp improved with increased dose of metoprolol     -   metoprolol succinate (TOPROL-XL) 50 mg 24 hr tablet; Take 1 tablet (50 mg total) by mouth daily    4  Class 3 severe obesity due to excess calories with serious comorbidity and body mass index (BMI) of 40 0 to 44 9 in adult [facility])  - s/p Toni-en-Y bypass in , her weight is increased from last summer/fall  Regular exercise is difficult due to work schedule and fatigue  5  Prediabetes  - improved, recent A1c 5 9      6  Vitamin D deficiency  - s/p high-dose weekly repletion, last 25OH-D 29 4 in December, will repeat due to fatigue symptoms, history of bypass  -   Vitamin D 25 hydroxy; Future    7  B12 deficiency  -last B12 243, she is no longer using IM B12  Check levels at this time  - Vitamin B12; Future    8  Microscopic hematuria  -2-4 RBCs in urine 2020, advised to wait until menses has finished to repeat testing    -   Urinalysis with microscopic    9  Annual physical exam    10   Gastroesophageal reflux disease, unspecified whether esophagitis present  -see above     - Ambulatory referral to Gastroenterology; Future  -     famotidine (PEPCID) 40 MG tablet; Take 1 tablet (40 mg total) by mouth daily at bedtime as needed for heartburn    11  History of Toni-en-Y gastric bypass  -see above    -   Ambulatory referral to Gastroenterology; Future             Chief Complaint     Chief Complaint   Patient presents with    Annual Exam    Abdominal Pain     onset on and off for 10 years flare up within the last 2 weeks no vomiting or diarrhea no black or bloody stools         History of Present Illness       Here for 6 month follow up  Abdominal pain: started about 2 weeks ago  Located in [place], radiates down, worse with eating/drinking, has nausea but no vomiting  No diarrhea, more constipation  No blood in stool  Has some belching, gas pain and chronic heartburn symptoms for which she takes protonix 40mg daily but reflux is not worse recently  Toni-en-Y bypass in 2011, GB removed at that time  She is taking zofran on occasion with relief  No fevers, chills or weight loss  Last EGD several years ago  B12 deficiency: has been off IM B12 for a few months  HTN: increase metoprolol to 50mg because home Bps were elevated, BP improved  Denies headaches, dizziness, chest pain  Reports fatigue, stress at home due to both parents recently diagnosed with cancer, father finished XRT for prostate cancer, mother is currently undergoing chemotherapy for endometrial cancer  Review of Systems   Review of Systems   Constitutional: Positive for appetite change and fatigue  Negative for chills, fever and unexpected weight change  HENT: Positive for postnasal drip, rhinorrhea and sneezing (takes zyrtec)  Respiratory: Negative for cough, chest tightness and shortness of breath  Cardiovascular: Negative for chest pain, palpitations and leg swelling  Gastrointestinal: Positive for abdominal pain, constipation and nausea   Negative for abdominal distention, blood in stool, diarrhea and vomiting  Genitourinary: Negative for difficulty urinating, dysuria and hematuria  Musculoskeletal: Positive for back pain  Neurological: Negative for dizziness, light-headedness, numbness and headaches           Current Medications       Current Outpatient Medications:     ascorbic acid (VITAMIN C) 250 mg tablet, Take 1 tablet (250 mg total) by mouth daily With FeSO4, Disp: 90 tablet, Rfl: 1    cetirizine (ZyrTEC) 10 mg tablet, , Disp: , Rfl:     cyanocobalamin 1,000 mcg/mL, INJECT 1 ML (1,000 MCG TOTAL) INTO A MUSCLE ONCE A WEEK, Disp: 12 mL, Rfl: 1    DULoxetine (CYMBALTA) 60 mg delayed release capsule, Take 2 capsules (120 mg total) by mouth daily, Disp: 180 capsule, Rfl: 1    ferrous sulfate 324 (65 Fe) mg, Take 2-3 tabs every other day with orange juice or vitamin C, Disp: 90 tablet, Rfl: 1    folic acid (FOLVITE) 594 mcg tablet, Take 1 tablet (400 mcg total) by mouth daily, Disp: 30 tablet, Rfl: 5    metoprolol succinate (TOPROL-XL) 50 mg 24 hr tablet, Take 1 tablet (50 mg total) by mouth daily, Disp: 30 tablet, Rfl: 5    mupirocin (BACTROBAN) 2 % ointment, Apply topically 3 (three) times a day, Disp: 22 g, Rfl: 0    ondansetron (ZOFRAN) 8 mg tablet, Take 1 tablet (8 mg total) by mouth every 8 (eight) hours as needed for nausea or vomiting, Disp: 20 tablet, Rfl: 3    pantoprazole (PROTONIX) 40 mg tablet, Take 1 tablet (40 mg total) by mouth daily, Disp: 90 tablet, Rfl: 1    triamcinolone (KENALOG) 0 1 % cream, Apply topically 2 (two) times a day, Disp: 30 g, Rfl: 2    famotidine (PEPCID) 40 MG tablet, Take 1 tablet (40 mg total) by mouth daily at bedtime as needed for heartburn, Disp: 30 tablet, Rfl: 1    Current Allergies     Allergies as of 09/15/2021 - Reviewed 09/15/2021   Allergen Reaction Noted    Bee pollen  09/29/2016    Pollen extract  09/29/2016            The following portions of the patient's history were reviewed and updated as appropriate: allergies, current medications, past family history, past medical history, past social history, past surgical history and problem list      Past Medical History:   Diagnosis Date    Chronic back pain     secondary to spinal stenosis    Environmental allergies     GERD (gastroesophageal reflux disease)     Hypertension     Spinal stenosis        Past Surgical History:   Procedure Laterality Date    CHOLECYSTECTOMY      GASTRIC BYPASS  2011       Family History   Problem Relation Age of Onset    Diabetes Mother     Hypertension Mother     Endometrial cancer Mother     Diabetes Father     Hypertension Father     Heart disease Father     Prostate cancer Father          Medications have been verified  Objective   /76   Pulse 87   Temp 98 5 °F (36 9 °C)   Resp 20   Ht 5' 7" (1 702 m)   Wt 118 kg (260 lb 12 8 oz)   SpO2 99%   BMI 40 85 kg/m²        Physical Exam     Physical Exam  Vitals reviewed  Constitutional:       General: She is not in acute distress  Appearance: She is obese  HENT:      Head: Normocephalic and atraumatic  Right Ear: Tympanic membrane, ear canal and external ear normal       Left Ear: Tympanic membrane, ear canal and external ear normal       Mouth/Throat:      Pharynx: No oropharyngeal exudate or posterior oropharyngeal erythema  Eyes:      General: No scleral icterus  Pupils: Pupils are equal, round, and reactive to light  Neck:      Thyroid: No thyromegaly  Cardiovascular:      Rate and Rhythm: Normal rate and regular rhythm  Heart sounds: S1 normal and S2 normal  No murmur heard  No friction rub  No gallop  Pulmonary:      Effort: Pulmonary effort is normal  No accessory muscle usage  Breath sounds: Normal breath sounds  No wheezing, rhonchi or rales  Abdominal:      General: Abdomen is flat  Bowel sounds are normal       Palpations: Abdomen is soft  Tenderness: There is no abdominal tenderness  There is no guarding or rebound        Hernia: No hernia is present  Musculoskeletal:      Right lower leg: No edema  Left lower leg: No edema  Lymphadenopathy:      Cervical: No cervical adenopathy  Neurological:      General: No focal deficit present  Mental Status: She is alert  Psychiatric:         Mood and Affect: Mood and affect normal          Speech: Speech normal          Behavior: Behavior normal  Behavior is cooperative  Thought Content:  Thought content normal              Results:  Lab Results   Component Value Date    SODIUM 137 07/31/2021    K 4 1 07/31/2021     07/31/2021    CO2 28 07/31/2021    BUN 8 07/31/2021    CREATININE 0 74 07/31/2021    CALCIUM 8 8 07/31/2021       Lab Results   Component Value Date    HGBA1C 5 9 (H) 07/31/2021       Lab Results   Component Value Date    WBC 8 54 07/31/2021    HGB 14 6 07/31/2021    HCT 45 2 07/31/2021    MCV 86 07/31/2021     07/31/2021

## 2021-09-15 NOTE — PATIENT INSTRUCTIONS

## 2021-09-15 NOTE — PROGRESS NOTES
[facility]ULT [facility]AL [facility]    NAME: Paul Anthony  AGE: 39 y o  SEX: female  : 1975     DATE: 9/15/2021     Assessment and Plan:     Problem List Items Addressed This Visit        Digestive    Gastroesophageal reflux disease    Relevant Medications    famotidine (PEPCID) 40 MG tablet    Other Relevant Orders    Ambulatory referral to Gastroenterology       Cardiovascular and Mediastinum    Essential hypertension    Relevant Medications    metoprolol succinate (TOPROL-XL) 50 mg 24 hr tablet       Genitourinary    Microscopic hematuria    Relevant Orders    Urinalysis with microscopic       Other    Class 3 severe obesity due to excess calories with serious comorbidity and body mass index (BMI) of 40 0 to 44 9 in adult [facility])    Prediabetes    History of Toni-en-Y gastric bypass    Relevant Orders    Ambulatory referral to Gastroenterology    Vitamin D deficiency    Relevant Orders    Vitamin D 25 hydroxy    B12 deficiency    Relevant Orders    Vitamin B12      Other Visit Diagnoses     Annual physical exam    -  Primary    Encounter for screening mammogram for malignant neoplasm of breast        Relevant Orders    Mammo screening bilateral w 3d & cad    Left upper quadrant abdominal pain        Relevant Medications    ondansetron (ZOFRAN) 8 mg tablet    famotidine (PEPCID) 40 MG tablet    Other Relevant Orders    Ambulatory referral to Gastroenterology          Immunizations and preventive care screenings were discussed with patient today  Appropriate education was printed on patient's after visit summary  Counseling:  Alcohol/drug use: discussed moderation in alcohol intake, the recommendations for healthy alcohol use, and avoidance of illicit drug use  Dental Health: discussed importance of regular tooth brushing, flossing, and dental visits    Injury prevention: discussed safety/seat belts, safety helmets, smoke detectors, carbon dioxide detectors, and smoking near bedding or upholstery  Sexual health: discussed sexually transmitted diseases, partner selection, use of condoms, avoidance of unintended pregnancy, and contraceptive alternatives  · Exercise: the importance of regular exercise/physical activity was discussed  Recommend exercise 3-5 times per week for at least 30 minutes  BMI Counseling: Body mass index is 40 85 kg/m²  The BMI is above normal  Nutrition recommendations include decreasing portion sizes and encouraging healthy choices of fruits and vegetables  Exercise recommendations include moderate physical activity 150 minutes/week  Rationale for BMI follow-up plan is due to patient being overweight or obese  Depression Screening and Follow-up Plan:   Patient was screened for depression during today's encounter  They screened negative with a PHQ-2 score of 0  No follow-ups on file  Chief Complaint:     Chief Complaint   Patient presents with    Annual Exam    Abdominal Pain     onset on and off for 10 years flare up within the last 2 weeks no vomiting or diarrhea no black or bloody stools      History of Present Illness:     Adult Annual Physical   Patient here for a comprehensive physical exam  The patient reports problems - refer to separate note  Diet and Physical Activity  · Diet/Nutrition: poor diet  · Exercise: no formal exercise  Depression Screening  PHQ-9 Depression Screening    PHQ-9:   Frequency of the following problems over the past two weeks:      Little interest or pleasure in doing things: 0 - not at all  Feeling down, depressed, or hopeless: 0 - not at all  PHQ-2 Score: 0       General Health  · Sleep: gets 7-8 hours of sleep on average and unrefreshing sleep  · Hearing: normal - bilateral   · Vision: no vision problems and goes for regular eye exams  · Dental: no dental visits for >1 year         /GYN Health  · Patient is: premenopausal  · Last menstrual period: 4 days ago  · Contraceptive method: IUD placement  Review of Systems:     Review of Systems   Refer to separate note     Past Medical History:     Past Medical History:   Diagnosis Date    Chronic back pain     secondary to spinal stenosis    Environmental allergies     GERD (gastroesophageal reflux disease)     Hypertension     Spinal stenosis       Past Surgical History:     Past Surgical History:   Procedure Laterality Date    CHOLECYSTECTOMY      GASTRIC BYPASS  2011      Social History:     Social History     Socioeconomic History    Marital status: Single     Spouse name: None    Number of children: None    Years of education: None    Highest education level: None   Occupational History    None   Tobacco Use    Smoking status: Never Smoker    Smokeless tobacco: Never Used   Vaping Use    Vaping Use: Never used   Substance and Sexual Activity    Alcohol use: No    Drug use: No    Sexual activity: Not Currently   Other Topics Concern    None   Social History Narrative    None     Social Determinants of Health     Financial Resource Strain:     Difficulty of Paying Living Expenses:    Food Insecurity:     Worried About Running Out of Food in the Last Year:     Ran Out of Food in the Last Year:    Transportation Needs:     Lack of Transportation (Medical):      Lack of Transportation (Non-Medical):    Physical Activity:     Days of Exercise per Week:     Minutes of Exercise per Session:    Stress:     Feeling of Stress :    Social Connections:     Frequency of Communication with Friends and Family:     Frequency of Social Gatherings with Friends and Family:     Attends [employer] Services:     Active Member of Clubs or Organizations:     Attends Club or Organization Meetings:     Marital Status:    Intimate Partner Violence:     Fear of Current or Ex-Partner:     Emotionally Abused:     Physically Abused:     Sexually Abused:       Family History:     Family History   Problem Relation Age of Onset    Diabetes Mother     Hypertension Mother     Endometrial cancer Mother     Diabetes Father     Hypertension Father     Heart disease Father     Prostate cancer Father       Current Medications:     Current Outpatient Medications   Medication Sig Dispense Refill    ascorbic acid (VITAMIN C) 250 mg tablet Take 1 tablet (250 mg total) by mouth daily With FeSO4 90 tablet 1    cetirizine (ZyrTEC) 10 mg tablet       cyanocobalamin 1,000 mcg/mL INJECT 1 ML (1,000 MCG TOTAL) INTO A MUSCLE ONCE A WEEK 12 mL 1    DULoxetine (CYMBALTA) 60 mg delayed release capsule Take 2 capsules (120 mg total) by mouth daily 180 capsule 1    ferrous sulfate 324 (65 Fe) mg Take 2-3 tabs every other day with orange juice or vitamin C 90 tablet 1    folic acid (FOLVITE) 746 mcg tablet Take 1 tablet (400 mcg total) by mouth daily 30 tablet 5    metoprolol succinate (TOPROL-XL) 50 mg 24 hr tablet Take 1 tablet (50 mg total) by mouth daily 30 tablet 5    mupirocin (BACTROBAN) 2 % ointment Apply topically 3 (three) times a day 22 g 0    ondansetron (ZOFRAN) 8 mg tablet Take 1 tablet (8 mg total) by mouth every 8 (eight) hours as needed for nausea or vomiting 20 tablet 3    pantoprazole (PROTONIX) 40 mg tablet Take 1 tablet (40 mg total) by mouth daily 90 tablet 1    triamcinolone (KENALOG) 0 1 % cream Apply topically 2 (two) times a day 30 g 2    famotidine (PEPCID) 40 MG tablet Take 1 tablet (40 mg total) by mouth daily at bedtime as needed for heartburn 30 tablet 1     No current facility-administered medications for this visit  Allergies:      Allergies   Allergen Reactions    Bee Pollen     Pollen Extract       Physical Exam:     /76   Pulse 87   Temp 98 5 °F (36 9 °C)   Resp 20   Ht 5' 7" (1 702 m)   Wt 118 kg (260 lb 12 8 oz)   SpO2 99%   BMI 40 85 kg/m²     Physical Exam   Refer to separate note    Mariana Correa MD  [place] [address]

## 2021-10-05 DIAGNOSIS — M54.50 CHRONIC LOW BACK PAIN, UNSPECIFIED BACK PAIN LATERALITY, UNSPECIFIED WHETHER SCIATICA PRESENT: ICD-10-CM

## 2021-10-05 DIAGNOSIS — G89.29 CHRONIC LOW BACK PAIN, UNSPECIFIED BACK PAIN LATERALITY, UNSPECIFIED WHETHER SCIATICA PRESENT: ICD-10-CM

## 2021-10-05 RX ORDER — DULOXETIN HYDROCHLORIDE 60 MG/1
120 CAPSULE, DELAYED RELEASE ORAL DAILY
Qty: 180 CAPSULE | Refills: 1 | Status: SHIPPED | OUTPATIENT
Start: 2021-10-05 | End: 2022-04-04 | Stop reason: SDUPTHER

## 2021-10-15 ENCOUNTER — PATIENT MESSAGE (OUTPATIENT)
Dept: FAMILY MEDICINE CLINIC | Facility: CLINIC | Age: 46
End: 2021-10-15

## 2021-10-15 DIAGNOSIS — L03.119 CELLULITIS OF LOWER EXTREMITY, UNSPECIFIED LATERALITY: Primary | ICD-10-CM

## 2021-10-15 RX ORDER — CEPHALEXIN 500 MG/1
500 CAPSULE ORAL EVERY 6 HOURS SCHEDULED
Qty: 28 CAPSULE | Refills: 0 | Status: SHIPPED | OUTPATIENT
Start: 2021-10-15 | End: 2021-10-22

## 2021-11-05 ENCOUNTER — OFFICE VISIT (OUTPATIENT)
Dept: GASTROENTEROLOGY | Facility: CLINIC | Age: 46
End: 2021-11-05
Payer: COMMERCIAL

## 2021-11-05 VITALS
DIASTOLIC BLOOD PRESSURE: 86 MMHG | BODY MASS INDEX: 41.78 KG/M2 | SYSTOLIC BLOOD PRESSURE: 118 MMHG | HEART RATE: 96 BPM | HEIGHT: 67 IN | TEMPERATURE: 96.8 F | WEIGHT: 266.2 LBS | RESPIRATION RATE: 18 BRPM

## 2021-11-05 DIAGNOSIS — K59.00 CONSTIPATION, UNSPECIFIED CONSTIPATION TYPE: Primary | ICD-10-CM

## 2021-11-05 DIAGNOSIS — K21.9 GASTROESOPHAGEAL REFLUX DISEASE, UNSPECIFIED WHETHER ESOPHAGITIS PRESENT: ICD-10-CM

## 2021-11-05 DIAGNOSIS — R10.12 LEFT UPPER QUADRANT ABDOMINAL PAIN: ICD-10-CM

## 2021-11-05 DIAGNOSIS — Z98.84 HISTORY OF ROUX-EN-Y GASTRIC BYPASS: ICD-10-CM

## 2021-11-05 PROCEDURE — 3074F SYST BP LT 130 MM HG: CPT | Performed by: STUDENT IN AN ORGANIZED HEALTH CARE EDUCATION/TRAINING PROGRAM

## 2021-11-05 PROCEDURE — 99203 OFFICE O/P NEW LOW 30 MIN: CPT | Performed by: STUDENT IN AN ORGANIZED HEALTH CARE EDUCATION/TRAINING PROGRAM

## 2021-11-05 PROCEDURE — 1036F TOBACCO NON-USER: CPT | Performed by: STUDENT IN AN ORGANIZED HEALTH CARE EDUCATION/TRAINING PROGRAM

## 2021-11-05 PROCEDURE — 3008F BODY MASS INDEX DOCD: CPT | Performed by: STUDENT IN AN ORGANIZED HEALTH CARE EDUCATION/TRAINING PROGRAM

## 2021-11-05 PROCEDURE — 3079F DIAST BP 80-89 MM HG: CPT | Performed by: STUDENT IN AN ORGANIZED HEALTH CARE EDUCATION/TRAINING PROGRAM

## 2021-11-12 ENCOUNTER — TELEPHONE (OUTPATIENT)
Dept: FAMILY MEDICINE CLINIC | Facility: CLINIC | Age: 46
End: 2021-11-12

## 2021-11-17 DIAGNOSIS — R73.03 PREDIABETES: Primary | ICD-10-CM

## 2021-11-17 DIAGNOSIS — Z13.220 SCREENING FOR CHOLESTEROL LEVEL: ICD-10-CM

## 2021-11-17 DIAGNOSIS — R53.82 CHRONIC FATIGUE: ICD-10-CM

## 2021-11-26 DIAGNOSIS — I10 ESSENTIAL HYPERTENSION: ICD-10-CM

## 2021-11-26 RX ORDER — METOPROLOL SUCCINATE 50 MG/1
50 TABLET, EXTENDED RELEASE ORAL DAILY
Qty: 90 TABLET | Refills: 0 | Status: SHIPPED | OUTPATIENT
Start: 2021-11-26 | End: 2022-01-17 | Stop reason: SDUPTHER

## 2021-12-08 ENCOUNTER — TELEPHONE (OUTPATIENT)
Dept: FAMILY MEDICINE CLINIC | Facility: CLINIC | Age: 46
End: 2021-12-08

## 2021-12-17 ENCOUNTER — HOSPITAL ENCOUNTER (OUTPATIENT)
Dept: VASCULAR ULTRASOUND | Facility: HOSPITAL | Age: 46
Discharge: HOME | End: 2021-12-17
Payer: COMMERCIAL

## 2021-12-17 ENCOUNTER — OFFICE VISIT (OUTPATIENT)
Dept: FAMILY MEDICINE CLINIC | Facility: CLINIC | Age: 46
End: 2021-12-17
Payer: COMMERCIAL

## 2021-12-17 VITALS
TEMPERATURE: 99.5 F | HEIGHT: 67 IN | SYSTOLIC BLOOD PRESSURE: 134 MMHG | HEART RATE: 82 BPM | DIASTOLIC BLOOD PRESSURE: 84 MMHG | WEIGHT: 261 LBS | OXYGEN SATURATION: 99 % | BODY MASS INDEX: 40.97 KG/M2

## 2021-12-17 DIAGNOSIS — M79.89 PAIN AND SWELLING OF LEFT LOWER LEG: ICD-10-CM

## 2021-12-17 DIAGNOSIS — M79.662 PAIN AND SWELLING OF LEFT LOWER LEG: ICD-10-CM

## 2021-12-17 DIAGNOSIS — M79.662 PAIN AND SWELLING OF LEFT LOWER LEG: Primary | ICD-10-CM

## 2021-12-17 DIAGNOSIS — M79.89 PAIN AND SWELLING OF LEFT LOWER LEG: Primary | ICD-10-CM

## 2021-12-17 PROCEDURE — 3075F SYST BP GE 130 - 139MM HG: CPT | Performed by: NURSE PRACTITIONER

## 2021-12-17 PROCEDURE — 99213 OFFICE O/P EST LOW 20 MIN: CPT | Performed by: NURSE PRACTITIONER

## 2021-12-17 PROCEDURE — 3725F SCREEN DEPRESSION PERFORMED: CPT | Performed by: NURSE PRACTITIONER

## 2021-12-17 PROCEDURE — 1036F TOBACCO NON-USER: CPT | Performed by: NURSE PRACTITIONER

## 2021-12-17 PROCEDURE — 93971 EXTREMITY STUDY: CPT

## 2021-12-17 PROCEDURE — 93971 EXTREMITY STUDY: CPT | Performed by: SURGERY

## 2021-12-17 PROCEDURE — 3079F DIAST BP 80-89 MM HG: CPT | Performed by: NURSE PRACTITIONER

## 2021-12-17 PROCEDURE — 3008F BODY MASS INDEX DOCD: CPT | Performed by: NURSE PRACTITIONER

## 2021-12-29 ENCOUNTER — HOSPITAL ENCOUNTER (OUTPATIENT)
Dept: GASTROENTEROLOGY | Facility: HOSPITAL | Age: 46
Setting detail: OUTPATIENT SURGERY
Discharge: HOME | End: 2021-12-29
Payer: COMMERCIAL

## 2021-12-29 ENCOUNTER — ANESTHESIA EVENT (OUTPATIENT)
Dept: GASTROENTEROLOGY | Facility: HOSPITAL | Age: 46
End: 2021-12-29

## 2021-12-29 ENCOUNTER — ANESTHESIA (OUTPATIENT)
Dept: GASTROENTEROLOGY | Facility: HOSPITAL | Age: 46
End: 2021-12-29

## 2021-12-29 VITALS
WEIGHT: 257 LBS | SYSTOLIC BLOOD PRESSURE: 110 MMHG | HEIGHT: 67 IN | DIASTOLIC BLOOD PRESSURE: 64 MMHG | RESPIRATION RATE: 16 BRPM | OXYGEN SATURATION: 97 % | BODY MASS INDEX: 40.34 KG/M2 | HEART RATE: 77 BPM | TEMPERATURE: 98.1 F

## 2021-12-29 DIAGNOSIS — R10.12 LEFT UPPER QUADRANT ABDOMINAL PAIN: ICD-10-CM

## 2021-12-29 DIAGNOSIS — Z98.84 HISTORY OF ROUX-EN-Y GASTRIC BYPASS: ICD-10-CM

## 2021-12-29 PROCEDURE — 43235 EGD DIAGNOSTIC BRUSH WASH: CPT | Performed by: STUDENT IN AN ORGANIZED HEALTH CARE EDUCATION/TRAINING PROGRAM

## 2021-12-29 RX ORDER — SODIUM CHLORIDE 9 MG/ML
INJECTION, SOLUTION INTRAVENOUS CONTINUOUS PRN
Status: DISCONTINUED | OUTPATIENT
Start: 2021-12-29 | End: 2021-12-29

## 2021-12-29 RX ORDER — LIDOCAINE HYDROCHLORIDE 10 MG/ML
INJECTION, SOLUTION EPIDURAL; INFILTRATION; INTRACAUDAL; PERINEURAL AS NEEDED
Status: DISCONTINUED | OUTPATIENT
Start: 2021-12-29 | End: 2021-12-29

## 2021-12-29 RX ORDER — SODIUM CHLORIDE, SODIUM LACTATE, POTASSIUM CHLORIDE, CALCIUM CHLORIDE 600; 310; 30; 20 MG/100ML; MG/100ML; MG/100ML; MG/100ML
50 INJECTION, SOLUTION INTRAVENOUS CONTINUOUS
Status: DISCONTINUED | OUTPATIENT
Start: 2021-12-29 | End: 2022-01-02 | Stop reason: HOSPADM

## 2021-12-29 RX ORDER — PROPOFOL 10 MG/ML
INJECTION, EMULSION INTRAVENOUS AS NEEDED
Status: DISCONTINUED | OUTPATIENT
Start: 2021-12-29 | End: 2021-12-29

## 2021-12-29 RX ADMIN — SODIUM CHLORIDE: 0.9 INJECTION, SOLUTION INTRAVENOUS at 09:55

## 2021-12-29 RX ADMIN — PROPOFOL 100 MG: 10 INJECTION, EMULSION INTRAVENOUS at 10:00

## 2021-12-29 RX ADMIN — PROPOFOL 150 MG: 10 INJECTION, EMULSION INTRAVENOUS at 09:55

## 2021-12-29 RX ADMIN — SODIUM CHLORIDE, SODIUM LACTATE, POTASSIUM CHLORIDE, AND CALCIUM CHLORIDE 50 ML/HR: .6; .31; .03; .02 INJECTION, SOLUTION INTRAVENOUS at 09:13

## 2021-12-29 RX ADMIN — LIDOCAINE HYDROCHLORIDE 50 MG: 10 INJECTION, SOLUTION EPIDURAL; INFILTRATION; INTRACAUDAL; PERINEURAL at 09:56

## 2021-12-29 NOTE — NURSING NOTE
Pt unable to urinate x2 in bathroom  Pt has menses currently   Dr Lela Perez aware and is ok with no urine pregnancy test

## 2022-01-11 ENCOUNTER — IMMUNIZATIONS (OUTPATIENT)
Dept: FAMILY MEDICINE CLINIC | Facility: HOSPITAL | Age: 47
End: 2022-01-11

## 2022-01-11 DIAGNOSIS — Z23 ENCOUNTER FOR IMMUNIZATION: Primary | ICD-10-CM

## 2022-01-11 PROCEDURE — 0064A COVID-19 MODERNA VACC 0.25 ML BOOSTER: CPT

## 2022-01-11 PROCEDURE — 91306 COVID-19 MODERNA VACC 0.25 ML BOOSTER: CPT

## 2022-01-17 ENCOUNTER — TELEPHONE (OUTPATIENT)
Dept: FAMILY MEDICINE CLINIC | Facility: CLINIC | Age: 47
End: 2022-01-17

## 2022-01-17 DIAGNOSIS — I10 ESSENTIAL HYPERTENSION: ICD-10-CM

## 2022-01-17 DIAGNOSIS — Z98.84 HISTORY OF ROUX-EN-Y GASTRIC BYPASS: ICD-10-CM

## 2022-01-17 RX ORDER — METOPROLOL SUCCINATE 50 MG/1
50 TABLET, EXTENDED RELEASE ORAL DAILY
Qty: 90 TABLET | Refills: 0 | Status: SHIPPED | OUTPATIENT
Start: 2022-01-17 | End: 2022-04-22 | Stop reason: SDUPTHER

## 2022-01-17 RX ORDER — LANOLIN ALCOHOL/MO/W.PET/CERES
400 CREAM (GRAM) TOPICAL DAILY
Qty: 90 TABLET | Refills: 0 | Status: SHIPPED | OUTPATIENT
Start: 2022-01-17

## 2022-01-17 NOTE — TELEPHONE ENCOUNTER
Patient requesting refill(s) of: folic acid 433 mcg daily    Last filled: 9/13/2021 #30 x 5      Patient requesting refill(s) of: metoprolol succinate 50 mg daily    Last filled: 11/26/2021 #90 x 0  Last appt: 12/17/2021  Next appt:3/16/2022  [facility]: [facility]

## 2022-01-24 NOTE — TELEPHONE ENCOUNTER
[facility] the pharmacist called to clarify medication sent in for the Patient  Patient recently has Gastric Bypass and Torol XL is a extended release medication and was checking to see if PCP wanted to switch to just the regular Toprol 50mg 1 tab Daily instead   Call [phone] and use Ref Number [id number]

## 2022-01-24 NOTE — TELEPHONE ENCOUNTER
Pt's bypass was 2011, she's been tolerating Toprol for a while  I do not see a need to change at this time

## 2022-03-10 ENCOUNTER — RA CDI HCC (OUTPATIENT)
Dept: OTHER | Facility: HOSPITAL | Age: 47
End: 2022-03-10

## 2022-03-10 NOTE — PROGRESS NOTES
The following dx found on active problem list - please assess using MEAT for 2022 billing    E66 01: Morbid (severe) obesity due to excess calories ([facility] [address] ) -     Per CMS/ICD 10 coding guidelines, BMI of 40 or higher; Class 3 obesity is sometimes categorized as "morbid," "extreme," or "severe" obesity      [address]  coding opportunities             Chart Reviewed * (Number of) Inbasket suggestions sent to Provider: 1                  Patients insurance company: [employer] (Medicare Advantage and Commercial)

## 2022-03-18 ENCOUNTER — RA CDI HCC (OUTPATIENT)
Dept: OTHER | Facility: HOSPITAL | Age: 47
End: 2022-03-18

## 2022-03-18 NOTE — PROGRESS NOTES
The following dx found on active problem list - please assess using MEAT for 2022 billing     E66 01: Morbid (severe) obesity due to excess calories ([facility] [address] ) -      Per CMS/ICD 10 coding guidelines, BMI of 40 or higher; Class 3 obesity is sometimes categorized as "morbid," "extreme," or "severe" obesity      [address]  coding opportunities          Chart Reviewed number of suggestions sent to Provider: 1     Patients Insurance        Commercial Insurance: [employer]

## 2022-03-28 ENCOUNTER — RA CDI HCC (OUTPATIENT)
Dept: OTHER | Facility: HOSPITAL | Age: 47
End: 2022-03-28

## 2022-03-28 NOTE — PROGRESS NOTES
The following dx found on active problem list - please assess using MEAT for 2022 billing     E66 01: Morbid (severe) obesity due to excess calories ([facility] [address] ) -      Per CMS/ICD 10 coding guidelines, BMI of 40 or higher; Class 3 obesity is sometimes categorized as "morbid," "extreme," or "severe" obesity      [address]  coding opportunities          Chart Reviewed number of suggestions sent to Provider: 1     Patients Insurance        Commercial Insurance: [employer]

## 2022-04-04 DIAGNOSIS — G89.29 CHRONIC LOW BACK PAIN, UNSPECIFIED BACK PAIN LATERALITY, UNSPECIFIED WHETHER SCIATICA PRESENT: ICD-10-CM

## 2022-04-04 DIAGNOSIS — M54.50 CHRONIC LOW BACK PAIN, UNSPECIFIED BACK PAIN LATERALITY, UNSPECIFIED WHETHER SCIATICA PRESENT: ICD-10-CM

## 2022-04-04 RX ORDER — DULOXETIN HYDROCHLORIDE 60 MG/1
120 CAPSULE, DELAYED RELEASE ORAL DAILY
Qty: 180 CAPSULE | Refills: 1 | Status: SHIPPED | OUTPATIENT
Start: 2022-04-04

## 2022-04-04 NOTE — TELEPHONE ENCOUNTER
Patient requesting refill(s) of: Duloxetine     Last filled: 10/5/2021 180 caps 1 refill   Last appt: 12/17/2021   Next appt: 4/5/2022  [facility]: [facility]

## 2022-04-05 ENCOUNTER — OFFICE VISIT (OUTPATIENT)
Dept: FAMILY MEDICINE CLINIC | Facility: CLINIC | Age: 47
End: 2022-04-05
Payer: COMMERCIAL

## 2022-04-05 VITALS
HEART RATE: 86 BPM | SYSTOLIC BLOOD PRESSURE: 118 MMHG | HEIGHT: 67 IN | RESPIRATION RATE: 18 BRPM | WEIGHT: 258.2 LBS | OXYGEN SATURATION: 99 % | TEMPERATURE: 100.6 F | BODY MASS INDEX: 40.53 KG/M2 | DIASTOLIC BLOOD PRESSURE: 64 MMHG

## 2022-04-05 DIAGNOSIS — Z20.822 EXPOSURE TO COVID-19 VIRUS: ICD-10-CM

## 2022-04-05 DIAGNOSIS — I10 ESSENTIAL HYPERTENSION: ICD-10-CM

## 2022-04-05 DIAGNOSIS — J30.89 SEASONAL ALLERGIC RHINITIS DUE TO OTHER ALLERGIC TRIGGER: ICD-10-CM

## 2022-04-05 DIAGNOSIS — E66.01 CLASS 3 SEVERE OBESITY DUE TO EXCESS CALORIES WITH SERIOUS COMORBIDITY AND BODY MASS INDEX (BMI) OF 40.0 TO 44.9 IN ADULT (HCC): ICD-10-CM

## 2022-04-05 DIAGNOSIS — R53.82 CHRONIC FATIGUE: ICD-10-CM

## 2022-04-05 DIAGNOSIS — R73.03 PREDIABETES: Primary | ICD-10-CM

## 2022-04-05 DIAGNOSIS — B34.9 VIRAL INFECTION, UNSPECIFIED: ICD-10-CM

## 2022-04-05 DIAGNOSIS — E53.8 B12 DEFICIENCY: ICD-10-CM

## 2022-04-05 DIAGNOSIS — E55.9 VITAMIN D DEFICIENCY: ICD-10-CM

## 2022-04-05 DIAGNOSIS — D50.8 IRON DEFICIENCY ANEMIA SECONDARY TO INADEQUATE DIETARY IRON INTAKE: ICD-10-CM

## 2022-04-05 PROCEDURE — 99214 OFFICE O/P EST MOD 30 MIN: CPT | Performed by: INTERNAL MEDICINE

## 2022-04-05 PROCEDURE — 87636 SARSCOV2 & INF A&B AMP PRB: CPT | Performed by: INTERNAL MEDICINE

## 2022-04-05 PROCEDURE — 3725F SCREEN DEPRESSION PERFORMED: CPT | Performed by: INTERNAL MEDICINE

## 2022-04-05 PROCEDURE — 3008F BODY MASS INDEX DOCD: CPT | Performed by: STUDENT IN AN ORGANIZED HEALTH CARE EDUCATION/TRAINING PROGRAM

## 2022-04-05 RX ORDER — AZELASTINE 1 MG/ML
2 SPRAY, METERED NASAL 2 TIMES DAILY
Qty: 30 ML | Refills: 1 | Status: SHIPPED | OUTPATIENT
Start: 2022-04-05

## 2022-04-05 NOTE — PROGRESS NOTES
[facility]        NAME: Yahaira Allen is a 55 y o  female  : 1975    MRN: [id number]  DATE: 2022  TIME: 3:36 PM    Assessment and Plan   1  Prediabetes  -     HEMOGLOBIN A1C W/ EAG ESTIMATION; Future    2  Vitamin D deficiency  -     Vitamin D 25 hydroxy; Future    3  B12 deficiency  -     Vitamin B12; Future    4  Essential hypertension  -     CBC and differential; Future  -     Comprehensive metabolic panel; Future  -     Lipid Panel with Direct LDL reflex; Future    5  Iron deficiency anemia secondary to inadequate dietary iron intake  -     CBC and differential; Future    6  Seasonal allergic rhinitis due to other allergic trigger  -     azelastine (ASTELIN) 0 1 % nasal spray; 2 sprays into each nostril 2 (two) times a day Use in each nostril as directed    7  Chronic fatigue  -     CBC and differential; Future  -     Iron Panel (Includes Ferritin, Iron Sat%, Iron, and TIBC); Future  -     Comprehensive metabolic panel; Future  -     Vitamin D 25 hydroxy; Future  -     TSH, 3rd generation with Free T4 reflex; Future    8  Exposure to COVID-19 virus  -     Covid/Flu- Office Collect    9  Viral infection, unspecified  -     Covid/Flu- Office Collect    10  Class 3 severe obesity due to excess calories with serious comorbidity and body mass index (BMI) of 40 0 to 44 9 in adult (HCC)  -     TSH, 3rd generation with Free T4 reflex; Future  -     HEMOGLOBIN A1C W/ EAG ESTIMATION; Future  -     Lipid Panel with Direct LDL reflex; Future             Chief Complaint     Chief Complaint   Patient presents with    Follow-up    Fatigue     allergies are acting up, also reports ear pain and sinus pressure         History of Present Illness       Here for 6 month follow up HTN, obesity, pre-diabetes  Reports chronic fatigue, falling asleep easily, low energy  Some shortness of breath on exertion  Denies chest pain or lightheadedness  Denies leg swelling, orthopnea or PND  Allergies have been bad lately, with nasal congestion, ear pressure, post-nasal drip, sinus pressure and headaches  Also reports generalized itching if she doesn't take zyrtec  Denies SOB or cough  Some fevers/chills, temp 100 6  Review of Systems   Review of Systems   Constitutional: Positive for appetite change, chills, fatigue, fever and unexpected weight change  HENT: Positive for congestion, postnasal drip, rhinorrhea, sinus pressure and sore throat  Respiratory: Negative for cough, chest tightness and shortness of breath  Cardiovascular: Negative for chest pain, palpitations and leg swelling  Gastrointestinal: Positive for abdominal pain and nausea  Negative for constipation, diarrhea and vomiting  Musculoskeletal: Positive for arthralgias and back pain  Neurological: Positive for headaches  Negative for dizziness and light-headedness           Current Medications       Current Outpatient Medications:     ascorbic acid (VITAMIN C) 250 mg tablet, Take 1 tablet (250 mg total) by mouth daily With FeSO4, Disp: 90 tablet, Rfl: 1    cetirizine (ZyrTEC) 10 mg tablet, , Disp: , Rfl:     DULoxetine (CYMBALTA) 60 mg delayed release capsule, Take 2 capsules (120 mg total) by mouth daily, Disp: 180 capsule, Rfl: 1    esomeprazole (NexIUM) 20 mg capsule, Take 1 capsule (20 mg total) by mouth daily in the early morning, Disp: 30 capsule, Rfl: 11    famotidine (PEPCID) 40 MG tablet, Take 1 tablet (40 mg total) by mouth daily at bedtime as needed for heartburn, Disp: 30 tablet, Rfl: 1    ferrous sulfate 324 (65 Fe) mg, Take 2-3 tabs every other day with orange juice or vitamin C, Disp: 90 tablet, Rfl: 1    folic acid (FOLVITE) 885 mcg tablet, Take 1 tablet (400 mcg total) by mouth daily, Disp: 90 tablet, Rfl: 0    metoprolol succinate (TOPROL-XL) 50 mg 24 hr tablet, Take 1 tablet (50 mg total) by mouth daily, Disp: 90 tablet, Rfl: 0    ondansetron (ZOFRAN) 8 mg tablet, Take 1 tablet (8 mg total) by mouth every 8 (eight) hours as needed for nausea or vomiting, Disp: 20 tablet, Rfl: 3    azelastine (ASTELIN) 0 1 % nasal spray, 2 sprays into each nostril 2 (two) times a day Use in each nostril as directed, Disp: 30 mL, Rfl: 1    Current Allergies     Allergies as of 04/05/2022 - Reviewed 04/05/2022   Allergen Reaction Noted    Bee pollen  09/29/2016    Pollen extract  09/29/2016            The following portions of the patient's history were reviewed and updated as appropriate: allergies, current medications, past family history, past medical history, past social history, past surgical history and problem list      Past Medical History:   Diagnosis Date    Chronic back pain     secondary to spinal stenosis    Environmental allergies     GERD (gastroesophageal reflux disease)     Hypertension     Spinal stenosis        Past Surgical History:   Procedure Laterality Date    CHOLECYSTECTOMY      GASTRIC BYPASS  2011       Family History   Problem Relation Age of Onset    Diabetes Mother     Hypertension Mother     Endometrial cancer Mother     Diabetes Father     Hypertension Father     Heart disease Father     Prostate cancer Father          Medications have been verified  Objective   /64   Pulse 86   Temp (!) 100 6 °F (38 1 °C)   Resp 18   Ht 5' 7" (1 702 m)   Wt 117 kg (258 lb 3 2 oz)   SpO2 99%   BMI 40 44 kg/m²        Physical Exam     Physical Exam  Vitals reviewed  Constitutional:       General: She is not in acute distress  Appearance: She is obese  HENT:      Head: Normocephalic and atraumatic  Right Ear: Tympanic membrane, ear canal and external ear normal       Left Ear: Tympanic membrane, ear canal and external ear normal       Mouth/Throat:      Pharynx: No oropharyngeal exudate or posterior oropharyngeal erythema  Cardiovascular:      Rate and Rhythm: Normal rate and regular rhythm  Pulses: Normal pulses        Heart sounds: Normal heart sounds  No murmur heard  No friction rub  No gallop  Pulmonary:      Effort: Pulmonary effort is normal  No respiratory distress  Breath sounds: Normal breath sounds  No wheezing, rhonchi or rales  Abdominal:      General: Abdomen is flat  Bowel sounds are normal  There is no distension  Palpations: Abdomen is soft  There is no mass  Tenderness: There is no abdominal tenderness  There is no guarding or rebound  Hernia: No hernia is present  Lymphadenopathy:      Cervical: No cervical adenopathy  Neurological:      General: No focal deficit present  Mental Status: She is alert  Psychiatric:         Mood and Affect: Mood normal          Behavior: Behavior normal          Thought Content:  Thought content normal          Judgment: Judgment normal              Results:  Lab Results   Component Value Date    SODIUM 137 07/31/2021    K 4 1 07/31/2021     07/31/2021    CO2 28 07/31/2021    BUN 8 07/31/2021    CREATININE 0 74 07/31/2021    CALCIUM 8 8 07/31/2021       Lab Results   Component Value Date    HGBA1C 5 9 (H) 07/31/2021       Lab Results   Component Value Date    WBC 8 54 07/31/2021    HGB 14 6 07/31/2021    HCT 45 2 07/31/2021    MCV 86 07/31/2021     07/31/2021

## 2022-04-06 LAB
FLUAV RNA RESP QL NAA+PROBE: NEGATIVE
FLUBV RNA RESP QL NAA+PROBE: NEGATIVE
SARS-COV-2 RNA RESP QL NAA+PROBE: NEGATIVE

## 2022-04-21 ENCOUNTER — OFFICE VISIT (OUTPATIENT)
Dept: GASTROENTEROLOGY | Facility: MEDICAL CENTER | Age: 47
End: 2022-04-21
Payer: COMMERCIAL

## 2022-04-21 ENCOUNTER — APPOINTMENT (OUTPATIENT)
Dept: LAB | Facility: MEDICAL CENTER | Age: 47
End: 2022-04-21
Payer: COMMERCIAL

## 2022-04-21 VITALS
SYSTOLIC BLOOD PRESSURE: 114 MMHG | TEMPERATURE: 98.2 F | DIASTOLIC BLOOD PRESSURE: 77 MMHG | HEART RATE: 71 BPM | WEIGHT: 261.4 LBS | BODY MASS INDEX: 40.94 KG/M2

## 2022-04-21 DIAGNOSIS — I10 ESSENTIAL HYPERTENSION: ICD-10-CM

## 2022-04-21 DIAGNOSIS — Z12.11 COLON CANCER SCREENING: ICD-10-CM

## 2022-04-21 DIAGNOSIS — R10.9 LEFT SIDED ABDOMINAL PAIN: Primary | ICD-10-CM

## 2022-04-21 DIAGNOSIS — E66.01 CLASS 3 SEVERE OBESITY DUE TO EXCESS CALORIES WITH SERIOUS COMORBIDITY AND BODY MASS INDEX (BMI) OF 40.0 TO 44.9 IN ADULT (HCC): ICD-10-CM

## 2022-04-21 DIAGNOSIS — K59.00 CONSTIPATION, UNSPECIFIED CONSTIPATION TYPE: ICD-10-CM

## 2022-04-21 DIAGNOSIS — R53.82 CHRONIC FATIGUE: ICD-10-CM

## 2022-04-21 DIAGNOSIS — R73.03 PREDIABETES: ICD-10-CM

## 2022-04-21 DIAGNOSIS — E55.9 VITAMIN D DEFICIENCY: ICD-10-CM

## 2022-04-21 DIAGNOSIS — E53.8 B12 DEFICIENCY: ICD-10-CM

## 2022-04-21 DIAGNOSIS — K21.9 GASTROESOPHAGEAL REFLUX DISEASE WITHOUT ESOPHAGITIS: ICD-10-CM

## 2022-04-21 DIAGNOSIS — D50.8 IRON DEFICIENCY ANEMIA SECONDARY TO INADEQUATE DIETARY IRON INTAKE: ICD-10-CM

## 2022-04-21 LAB
25(OH)D3 SERPL-MCNC: 14 NG/ML (ref 30–100)
ALBUMIN SERPL BCP-MCNC: 3.7 G/DL (ref 3.5–5)
ALP SERPL-CCNC: 85 U/L (ref 46–116)
ALT SERPL W P-5'-P-CCNC: 32 U/L (ref 12–78)
ANION GAP SERPL CALCULATED.3IONS-SCNC: 2 MMOL/L (ref 4–13)
AST SERPL W P-5'-P-CCNC: 17 U/L (ref 5–45)
BASOPHILS # BLD AUTO: 0.08 THOUSANDS/ΜL (ref 0–0.1)
BASOPHILS NFR BLD AUTO: 1 % (ref 0–1)
BILIRUB SERPL-MCNC: 0.5 MG/DL (ref 0.2–1)
BUN SERPL-MCNC: 17 MG/DL (ref 5–25)
CALCIUM SERPL-MCNC: 8.8 MG/DL (ref 8.3–10.1)
CHLORIDE SERPL-SCNC: 107 MMOL/L (ref 100–108)
CHOLEST SERPL-MCNC: 204 MG/DL
CO2 SERPL-SCNC: 29 MMOL/L (ref 21–32)
CREAT SERPL-MCNC: 0.66 MG/DL (ref 0.6–1.3)
EOSINOPHIL # BLD AUTO: 0.38 THOUSAND/ΜL (ref 0–0.61)
EOSINOPHIL NFR BLD AUTO: 5 % (ref 0–6)
ERYTHROCYTE [DISTWIDTH] IN BLOOD BY AUTOMATED COUNT: 13.7 % (ref 11.6–15.1)
EST. AVERAGE GLUCOSE BLD GHB EST-MCNC: 128 MG/DL
FERRITIN SERPL-MCNC: 25 NG/ML (ref 8–388)
GFR SERPL CREATININE-BSD FRML MDRD: 106 ML/MIN/1.73SQ M
GLUCOSE P FAST SERPL-MCNC: 116 MG/DL (ref 65–99)
HBA1C MFR BLD: 6.1 %
HCT VFR BLD AUTO: 43.8 % (ref 34.8–46.1)
HDLC SERPL-MCNC: 63 MG/DL
HGB BLD-MCNC: 14 G/DL (ref 11.5–15.4)
IMM GRANULOCYTES # BLD AUTO: 0.02 THOUSAND/UL (ref 0–0.2)
IMM GRANULOCYTES NFR BLD AUTO: 0 % (ref 0–2)
IRON SATN MFR SERPL: 50 % (ref 15–50)
IRON SERPL-MCNC: 195 UG/DL (ref 50–170)
LDLC SERPL CALC-MCNC: 114 MG/DL (ref 0–100)
LYMPHOCYTES # BLD AUTO: 2.72 THOUSANDS/ΜL (ref 0.6–4.47)
LYMPHOCYTES NFR BLD AUTO: 36 % (ref 14–44)
MCH RBC QN AUTO: 27.6 PG (ref 26.8–34.3)
MCHC RBC AUTO-ENTMCNC: 32 G/DL (ref 31.4–37.4)
MCV RBC AUTO: 86 FL (ref 82–98)
MONOCYTES # BLD AUTO: 0.44 THOUSAND/ΜL (ref 0.17–1.22)
MONOCYTES NFR BLD AUTO: 6 % (ref 4–12)
NEUTROPHILS # BLD AUTO: 3.89 THOUSANDS/ΜL (ref 1.85–7.62)
NEUTS SEG NFR BLD AUTO: 52 % (ref 43–75)
NRBC BLD AUTO-RTO: 0 /100 WBCS
PLATELET # BLD AUTO: 288 THOUSANDS/UL (ref 149–390)
PMV BLD AUTO: 10.1 FL (ref 8.9–12.7)
POTASSIUM SERPL-SCNC: 4 MMOL/L (ref 3.5–5.3)
PROT SERPL-MCNC: 6.8 G/DL (ref 6.4–8.2)
RBC # BLD AUTO: 5.08 MILLION/UL (ref 3.81–5.12)
SODIUM SERPL-SCNC: 138 MMOL/L (ref 136–145)
TIBC SERPL-MCNC: 393 UG/DL (ref 250–450)
TRIGL SERPL-MCNC: 133 MG/DL
TSH SERPL DL<=0.05 MIU/L-ACNC: 0.81 UIU/ML (ref 0.45–4.5)
VIT B12 SERPL-MCNC: 369 PG/ML (ref 100–900)
WBC # BLD AUTO: 7.53 THOUSAND/UL (ref 4.31–10.16)

## 2022-04-21 PROCEDURE — 99214 OFFICE O/P EST MOD 30 MIN: CPT | Performed by: STUDENT IN AN ORGANIZED HEALTH CARE EDUCATION/TRAINING PROGRAM

## 2022-04-21 PROCEDURE — 83036 HEMOGLOBIN GLYCOSYLATED A1C: CPT

## 2022-04-21 PROCEDURE — 82607 VITAMIN B-12: CPT

## 2022-04-21 PROCEDURE — 83550 IRON BINDING TEST: CPT

## 2022-04-21 PROCEDURE — 85025 COMPLETE CBC W/AUTO DIFF WBC: CPT

## 2022-04-21 PROCEDURE — 83540 ASSAY OF IRON: CPT

## 2022-04-21 PROCEDURE — 80061 LIPID PANEL: CPT

## 2022-04-21 PROCEDURE — 82728 ASSAY OF FERRITIN: CPT

## 2022-04-21 PROCEDURE — 80053 COMPREHEN METABOLIC PANEL: CPT

## 2022-04-21 PROCEDURE — 84443 ASSAY THYROID STIM HORMONE: CPT

## 2022-04-21 PROCEDURE — 36415 COLL VENOUS BLD VENIPUNCTURE: CPT

## 2022-04-21 PROCEDURE — 1036F TOBACCO NON-USER: CPT | Performed by: STUDENT IN AN ORGANIZED HEALTH CARE EDUCATION/TRAINING PROGRAM

## 2022-04-21 PROCEDURE — 82306 VITAMIN D 25 HYDROXY: CPT

## 2022-04-21 RX ORDER — DICYCLOMINE HYDROCHLORIDE 10 MG/1
10 CAPSULE ORAL 4 TIMES DAILY PRN
Qty: 120 CAPSULE | Refills: 5 | Status: SHIPPED | OUTPATIENT
Start: 2022-04-21

## 2022-04-21 NOTE — PROGRESS NOTES
[facility] - Outpatient Follow-up Note  Georgia Brown 55 y o  female MRN: [id number]  Encounter: [id number]          ASSESSMENT AND PLAN:    55 F with h/o complicated gastric bypass, cholecystectomy, and HTN here for follow up of left sided abdominal pain  EGD without explanation of patient's pain  Does not meet IBS criteria but may be functional in nature  Could also represent adhesive disease but not tied to eating or defecation  Will check abdominal ultrasound to assess for spleen or ovary pathology  1  Left sided abdominal pain  - US abdomen complete; Future  - dicyclomine (BENTYL) 10 mg capsule; Take 1 capsule (10 mg total) by mouth 4 (four) times a day as needed (abdominal pain)  Dispense: 120 capsule; Refill: 5  - Counseled on FODMAP diet to avoid fermentable carbohydrates which may be contributing    2  Colon cancer screening Average risk, no red flags  - Colonoscopy; Future  - BMI>40, will perform in hospital setting for elevated procedural risk    3  Gastroesophageal reflux disease without esophagitis  -Well controlled on nexium    4  Constipation, unspecified constipation type  -Continue miralax    ______________________________________________________________________    SUBJECTIVE:   Last seen by me 11/5/21 for left sided abdominal pain, GERD and constipation  Started esomeprazole 20 mg daily and daily miralax  EGD 12/29/21 was normal with no marginal ulcer  Getting severe left sided gas pain  Starts in left upper or lower quadrant and then travels up abdomen  Has been going on since gastric bypass but getting more severe and frequent  Happening twice a week  Not affected by diet  GasX doesn't help  Nothing helps  Worse if dog lays on left side of abdomen  Taking miralax and bowels are better  Sometimes has to use it twice a day  Pain not associated with bowel movements  Taking nexium which helps with reflux  No prior colonoscopy   No family history of colon cancer  REVIEW OF SYSTEMS IS OTHERWISE NEGATIVE  Historical Information   Past Medical History:   Diagnosis Date    Chronic back pain     secondary to spinal stenosis    Environmental allergies     GERD (gastroesophageal reflux disease)     Hypertension     Spinal stenosis      Past Surgical History:   Procedure Laterality Date    CHOLECYSTECTOMY      GASTRIC BYPASS  2011     Social History   Social History     Substance and Sexual Activity   Alcohol Use No     Social History     Substance and Sexual Activity   Drug Use No     Social History     Tobacco Use   Smoking Status Never Smoker   Smokeless Tobacco Never Used     Family History   Problem Relation Age of Onset    Diabetes Mother     Hypertension Mother     Endometrial cancer Mother     Diabetes Father     Hypertension Father     Heart disease Father     Prostate cancer Father        Meds/Allergies       Current Outpatient Medications:     ascorbic acid (VITAMIN C) 250 mg tablet    azelastine (ASTELIN) 0 1 % nasal spray    cetirizine (ZyrTEC) 10 mg tablet    DULoxetine (CYMBALTA) 60 mg delayed release capsule    esomeprazole (NexIUM) 20 mg capsule    famotidine (PEPCID) 40 MG tablet    ferrous sulfate 324 (65 Fe) mg    folic acid (FOLVITE) 083 mcg tablet    metoprolol succinate (TOPROL-XL) 50 mg 24 hr tablet    ondansetron (ZOFRAN) 8 mg tablet    Allergies   Allergen Reactions    Bee Pollen     Pollen Extract            Objective   /77   Pulse 71   Temp 98 2 °F (36 8 °C)   Wt 119 kg (261 lb 6 4 oz)   BMI 40 94 kg/m²       PHYSICAL EXAM:      General Appearance:   Alert, cooperative, no distress   HEENT:   Normocephalic, atraumatic, anicteric  Neck:  Supple, symmetrical, trachea midline   Lungs:   Clear to auscultation bilaterally; no rales, rhonchi or wheezing; respirations unlabored    Heart[de-identified]   Regular rate and rhythm; no murmur, rub, or gallop     Abdomen:   Soft, non-tender, non-distended; normal bowel sounds; no masses, no organomegaly    Genitalia:   Deferred    Rectal:   Deferred    Extremities:  No cyanosis, clubbing or edema    Pulses:  2+ and symmetric    Skin:  No jaundice, rashes, or lesions    Lymph nodes:  No palpable cervical lymphadenopathy        Lab Results:   No visits with results within 1 Day(s) from this visit  Latest known visit with results is:   Office Visit on 04/05/2022   Component Date Value    SARS-CoV-2 04/05/2022 Negative     INFLUENZA A PCR 04/05/2022 Negative     INFLUENZA B PCR 04/05/2022 Negative          Radiology Results:   No results found        Answers for HPI/ROS submitted by the patient on 4/21/2022  Chronicity: chronic  Onset: more than 1 year ago  Onset quality: gradual  Frequency: every several days  Progression since onset: unchanged  Pain location: LLQ, LUQ  Pain - numeric: 6/10  Pain quality: aching, dull, sharp  Radiates to: does not radiate  anorexia: No  arthralgias: Yes  belching: Yes  constipation: Yes  dysuria: No  fever: No  flatus: Yes  frequency: No  headaches: Yes  hematochezia: No  hematuria: No  melena: No  myalgias: Yes  nausea: Yes  weight loss: No  vomiting: No  Aggravated by: nothing  Relieved by: nothing

## 2022-04-21 NOTE — PATIENT INSTRUCTIONS
Scheduled date of Colonoscopy (as of today): 05/16/2022  Physician performing: Dr Misty Selby  Location of procedure: [place]  Bowel prep reviewed with patient: Miralax/Magnesium Citrate  Instructions reviewed with patient by: MA  Clearances: n/a

## 2022-04-22 DIAGNOSIS — I10 ESSENTIAL HYPERTENSION: ICD-10-CM

## 2022-04-22 RX ORDER — METOPROLOL SUCCINATE 50 MG/1
50 TABLET, EXTENDED RELEASE ORAL DAILY
Qty: 90 TABLET | Refills: 1 | Status: SHIPPED | OUTPATIENT
Start: 2022-04-22 | End: 2022-07-29 | Stop reason: SDUPTHER

## 2022-04-22 NOTE — TELEPHONE ENCOUNTER
Patient requesting refill(s) of: Metoprolol 50mg    Last filled: 1/17/22 #90x0  Last appt: 4/5/22  Next appt: 10/12/22  Pharmacy: [facility]

## 2022-04-22 NOTE — TELEPHONE ENCOUNTER
----- Message from Katarina Sung sent at 4/22/2022  7:53 AM EDT -----  Regarding: Prescription refill  Hi,   [facility]      Thanks

## 2022-06-22 ENCOUNTER — PATIENT MESSAGE (OUTPATIENT)
Dept: FAMILY MEDICINE CLINIC | Facility: CLINIC | Age: 47
End: 2022-06-22

## 2022-06-22 DIAGNOSIS — L30.1 DYSHIDROTIC ECZEMA: Primary | ICD-10-CM

## 2022-06-22 RX ORDER — PREDNISONE 20 MG/1
TABLET ORAL
Qty: 11 TABLET | Refills: 0 | Status: SHIPPED | OUTPATIENT
Start: 2022-06-22 | End: 2022-07-01

## 2022-06-22 RX ORDER — CLOBETASOL PROPIONATE 0.5 MG/G
CREAM TOPICAL 2 TIMES DAILY
Qty: 60 G | Refills: 1 | Status: SHIPPED | OUTPATIENT
Start: 2022-06-22

## 2022-07-06 ENCOUNTER — PATIENT MESSAGE (OUTPATIENT)
Dept: FAMILY MEDICINE CLINIC | Facility: CLINIC | Age: 47
End: 2022-07-06

## 2022-07-06 DIAGNOSIS — L30.1 DYSHIDROTIC ECZEMA: Primary | ICD-10-CM

## 2022-07-08 RX ORDER — PREDNISONE 10 MG/1
TABLET ORAL
Qty: 53 TABLET | Refills: 0 | Status: SHIPPED | OUTPATIENT
Start: 2022-07-08 | End: 2022-07-30

## 2022-08-11 ENCOUNTER — TELEPHONE (OUTPATIENT)
Dept: FAMILY MEDICINE CLINIC | Facility: CLINIC | Age: 47
End: 2022-08-11

## 2022-08-11 DIAGNOSIS — R05.9 COUGH: Primary | ICD-10-CM

## 2022-08-11 DIAGNOSIS — R09.89 CHEST CONGESTION: ICD-10-CM

## 2022-08-11 DIAGNOSIS — R09.81 HEAD CONGESTION: ICD-10-CM

## 2022-08-11 DIAGNOSIS — J02.9 SORE THROAT: ICD-10-CM

## 2022-08-11 DIAGNOSIS — R09.89 RUNNY NOSE: ICD-10-CM

## 2022-08-11 PROCEDURE — U0005 INFEC AGEN DETEC AMPLI PROBE: HCPCS | Performed by: INTERNAL MEDICINE

## 2022-08-11 PROCEDURE — U0003 INFECTIOUS AGENT DETECTION BY NUCLEIC ACID (DNA OR RNA); SEVERE ACUTE RESPIRATORY SYNDROME CORONAVIRUS 2 (SARS-COV-2) (CORONAVIRUS DISEASE [COVID-19]), AMPLIFIED PROBE TECHNIQUE, MAKING USE OF HIGH THROUGHPUT TECHNOLOGIES AS DESCRIBED BY CMS-2020-01-R: HCPCS | Performed by: INTERNAL MEDICINE

## 2022-08-11 NOTE — TELEPHONE ENCOUNTER
Called and spoke with patient  States yesterday she started with cough, chest congestion, head congestion, sore throat, and runny nose  Denies having any other symptoms  She is vaccinated   Covid testing ordered  She will be coming to office parking lot today   Aware of instructions upon arrival

## 2022-08-11 NOTE — TELEPHONE ENCOUNTER
Jacinto Mercer called and stated that she was exposed to covid 19 days ago and yesterday she started with a sore throat and runny nose and chest and sinus congestion  She started taking mucinex and tylenol    She also stated she is taking care of a child who is sick with a virus  Temp by Ear 99 5    She is asking to get tested    Please advise    Phone: [phone]

## 2022-08-12 LAB — SARS-COV-2 RNA RESP QL NAA+PROBE: POSITIVE

## 2022-08-13 ENCOUNTER — TELEMEDICINE (OUTPATIENT)
Dept: FAMILY MEDICINE CLINIC | Facility: CLINIC | Age: 47
End: 2022-08-13
Payer: COMMERCIAL

## 2022-08-13 VITALS
TEMPERATURE: 98.4 F | OXYGEN SATURATION: 97 % | HEART RATE: 90 BPM | SYSTOLIC BLOOD PRESSURE: 138 MMHG | DIASTOLIC BLOOD PRESSURE: 87 MMHG

## 2022-08-13 DIAGNOSIS — U07.1 COVID-19: Primary | ICD-10-CM

## 2022-08-13 PROCEDURE — 99213 OFFICE O/P EST LOW 20 MIN: CPT | Performed by: INTERNAL MEDICINE

## 2022-08-13 NOTE — PROGRESS NOTES
COVID-19 Outpatient Progress Note    Assessment/Plan:    Problem List Items Addressed This Visit    None     Visit Diagnoses     COVID-19    -  Primary    Relevant Medications    nirmatrelvir & ritonavir (Paxlovid) tablet therapy pack         Disposition:     Patient has COVID-19 infection  Based off CDC guidelines, they were recommended to isolate for 5 days from the date of the positive test  If they remain asymptomatic, isolation may be ended followed by 5 days of wearing a mask when around othes to minimize risk of infecting others  If they have a fever, continue to stay home until fever resolves for at least 24 hours  I have spent 10 minutes directly with the patient  Greater than 50% of this time was spent in counseling/coordination of care regarding: risks and benefits of treatment options, instructions for management and impressions  Encounter provider Keenan Stewart MD    Provider located at [address] [place] [address] PRIMARY CARE  [address] [place] [id number] [phone]    Recent Visits  Date Type Provider Dept   08/11/22 Telephone Gracia Renevladislav  Primary Care   Showing recent visits within past 7 days and meeting all other requirements  Today's Visits  Date Type Provider Dept   08/13/22 Telemedicine Sallie Ygnacio Claude, MD [facility] Primary [facility]   Showing today's visits and meeting all other requirements  Future Appointments  No visits were found meeting these conditions  Showing future appointments within next 150 days and meeting all other requirements       The patient was identified by name and date of birth  Elicia Matute was informed that this is a telemedicine visit and that the visit is being conducted through [address] and patient was informed this is a secure, HIPAA-complaint platform  She agrees to proceed     My office door was closed  No one else was in the room    She acknowledged consent and understanding of privacy and security of the video platform  The patient has agreed to participate and understands they can discontinue the visit at any time  Patient is aware this is a billable service  Patient agrees to participate in a virtual check in via telephone or video visit instead of presenting to the office to address urgent/immediate medical needs  Patient is aware this is a billable service  After connecting through [facility], the patient was identified by name and date of birth  Jacqueline Chery was informed that this was a telemedicine visit and that the exam was being conducted confidentially over secure lines  My office door was closed  No one else was in the room  Jacqueline Chery acknowledged consent and understanding of privacy and security of the telemedicine visit  I informed the patient that I have reviewed her record in Epic and presented the opportunity for her to ask any questions regarding the visit today  The patient agreed to participate  Verification of patient location:  Patient is located in the following state in which I hold an active license: [place]    Subjective:   Jacqueline Chery is a 55 y o  female who has been screened for COVID-19  Patient's symptoms include fever, chills, fatigue, malaise, nasal congestion, rhinorrhea, sore throat, anosmia, cough, chest tightness, diarrhea, myalgias and headache  Patient denies loss of taste, shortness of breath, abdominal pain, nausea and vomiting      - Date of symptom onset: 8/10/2022  - Date of positive COVID-19 test: 8/11/2022  Type of test: PCR  COVID-19 vaccination status: Fully vaccinated with booster    Jose Molina has been staying home and has isolated themselves in her home  She is taking care to not share personal items and is cleaning all surfaces that are touched often, like counters, tabletops, and doorknobs using household cleaning sprays or wipes  She is wearing a mask when she leaves her room       Lab Results   Component Value Date SARSCOV2 Positive (A) 08/11/2022    SARSCOV2 Not Detected 12/29/2020     Past Medical History:   Diagnosis Date    Chronic back pain     secondary to spinal stenosis    Environmental allergies     GERD (gastroesophageal reflux disease)     Hypertension     Spinal stenosis      Past Surgical History:   Procedure Laterality Date    CHOLECYSTECTOMY      GASTRIC BYPASS  2011     Current Outpatient Medications   Medication Sig Dispense Refill    clobetasol (TEMOVATE) 0 05 % cream Apply topically 2 (two) times a day 60 g 1    nirmatrelvir & ritonavir (Paxlovid) tablet therapy pack Take 3 tablets by mouth 2 (two) times a day for 5 days Take 2 nirmatrelvir tablets + 1 ritonavir tablet together per dose 30 tablet 0    ascorbic acid (VITAMIN C) 250 mg tablet Take 1 tablet (250 mg total) by mouth daily With FeSO4 90 tablet 1    azelastine (ASTELIN) 0 1 % nasal spray 2 sprays into each nostril 2 (two) times a day Use in each nostril as directed 30 mL 1    cetirizine (ZyrTEC) 10 mg tablet       dicyclomine (BENTYL) 10 mg capsule Take 1 capsule (10 mg total) by mouth 4 (four) times a day as needed (abdominal pain) 120 capsule 5    DULoxetine (CYMBALTA) 60 mg delayed release capsule Take 2 capsules (120 mg total) by mouth daily 180 capsule 1    ergocalciferol (VITAMIN D2) 50,000 units Take 1 capsule (50,000 Units total) by mouth once a week for 12 doses 12 capsule 3    esomeprazole (NexIUM) 20 mg capsule Take 1 capsule (20 mg total) by mouth daily in the early morning 30 capsule 11    famotidine (PEPCID) 40 MG tablet Take 1 tablet (40 mg total) by mouth daily at bedtime as needed for heartburn 30 tablet 1    ferrous sulfate 324 (65 Fe) mg Take 2-3 tabs every other day with orange juice or vitamin C 90 tablet 1    folic acid (FOLVITE) 594 mcg tablet Take 1 tablet (400 mcg total) by mouth daily 90 tablet 0    metoprolol succinate (TOPROL-XL) 50 mg 24 hr tablet Take 1 tablet (50 mg total) by mouth daily 90 tablet 0    ondansetron (ZOFRAN) 8 mg tablet Take 1 tablet (8 mg total) by mouth every 8 (eight) hours as needed for nausea or vomiting 20 tablet 3     No current facility-administered medications for this visit  Allergies   Allergen Reactions    Bee Pollen     Pollen Extract        Review of Systems   Constitutional: Positive for chills, fatigue and fever  HENT: Positive for congestion, rhinorrhea and sore throat  Respiratory: Positive for cough and chest tightness  Negative for shortness of breath  Gastrointestinal: Positive for diarrhea  Negative for abdominal pain, nausea and vomiting  Musculoskeletal: Positive for myalgias  Neurological: Positive for headaches  Objective:    Vitals:    08/13/22 0916   BP: 138/87   Pulse: 90   Temp: 98 4 °F (36 9 °C)   TempSrc: Oral   SpO2: 97%       Physical Exam  Constitutional:       General: She is not in acute distress  Appearance: She is obese  Pulmonary:      Effort: Pulmonary effort is normal  No respiratory distress  Neurological:      Mental Status: She is alert     Psychiatric:         Mood and Affect: Mood normal          Behavior: Behavior normal

## 2022-08-15 ENCOUNTER — TELEPHONE (OUTPATIENT)
Dept: GASTROENTEROLOGY | Facility: MEDICAL CENTER | Age: 47
End: 2022-08-15

## 2022-08-15 ENCOUNTER — PATIENT MESSAGE (OUTPATIENT)
Dept: FAMILY MEDICINE CLINIC | Facility: CLINIC | Age: 47
End: 2022-08-15

## 2022-08-15 DIAGNOSIS — R06.02 SOB (SHORTNESS OF BREATH): ICD-10-CM

## 2022-08-15 DIAGNOSIS — J01.00 ACUTE NON-RECURRENT MAXILLARY SINUSITIS: ICD-10-CM

## 2022-08-15 DIAGNOSIS — R09.89 CHEST CONGESTION: Primary | ICD-10-CM

## 2022-08-15 DIAGNOSIS — U07.1 COVID-19: ICD-10-CM

## 2022-08-15 NOTE — TELEPHONE ENCOUNTER
Patients GI provider:  Dr Claire Buchanan    Number to return call: [phone]    Reason for call: Pt calling to reschedule colonoscopy from May    Scheduled procedure/appointment date if applicable: N/A

## 2022-08-17 ENCOUNTER — APPOINTMENT (OUTPATIENT)
Dept: RADIOLOGY | Facility: CLINIC | Age: 47
End: 2022-08-17
Payer: COMMERCIAL

## 2022-08-17 ENCOUNTER — OFFICE VISIT (OUTPATIENT)
Dept: FAMILY MEDICINE CLINIC | Facility: CLINIC | Age: 47
End: 2022-08-17
Payer: COMMERCIAL

## 2022-08-17 VITALS
HEIGHT: 67 IN | BODY MASS INDEX: 42 KG/M2 | TEMPERATURE: 97.7 F | WEIGHT: 267.6 LBS | HEART RATE: 92 BPM | DIASTOLIC BLOOD PRESSURE: 68 MMHG | SYSTOLIC BLOOD PRESSURE: 124 MMHG | RESPIRATION RATE: 18 BRPM | OXYGEN SATURATION: 99 %

## 2022-08-17 DIAGNOSIS — U07.1 COVID-19: ICD-10-CM

## 2022-08-17 DIAGNOSIS — U07.1 COVID-19: Primary | ICD-10-CM

## 2022-08-17 DIAGNOSIS — R05.9 COUGH: ICD-10-CM

## 2022-08-17 PROCEDURE — 99213 OFFICE O/P EST LOW 20 MIN: CPT | Performed by: INTERNAL MEDICINE

## 2022-08-17 PROCEDURE — 3725F SCREEN DEPRESSION PERFORMED: CPT | Performed by: INTERNAL MEDICINE

## 2022-08-17 PROCEDURE — 71046 X-RAY EXAM CHEST 2 VIEWS: CPT

## 2022-08-17 RX ORDER — ALBUTEROL SULFATE 90 UG/1
2 AEROSOL, METERED RESPIRATORY (INHALATION) EVERY 6 HOURS PRN
Qty: 8.5 G | Refills: 1 | Status: SHIPPED | OUTPATIENT
Start: 2022-08-17

## 2022-08-17 RX ORDER — AMOXICILLIN AND CLAVULANATE POTASSIUM 875; 125 MG/1; MG/1
1 TABLET, FILM COATED ORAL EVERY 12 HOURS SCHEDULED
Qty: 10 TABLET | Refills: 0 | Status: SHIPPED | OUTPATIENT
Start: 2022-08-17 | End: 2022-08-22

## 2022-08-17 RX ORDER — DEXTROMETHORPHAN HYDROBROMIDE AND PROMETHAZINE HYDROCHLORIDE 15; 6.25 MG/5ML; MG/5ML
5 SOLUTION ORAL 4 TIMES DAILY PRN
Qty: 240 ML | Refills: 1 | Status: SHIPPED | OUTPATIENT
Start: 2022-08-17 | End: 2022-09-13

## 2022-08-17 RX ORDER — PREDNISONE 20 MG/1
40 TABLET ORAL DAILY
Qty: 10 TABLET | Refills: 0 | Status: SHIPPED | OUTPATIENT
Start: 2022-08-17 | End: 2022-08-22

## 2022-08-17 RX ORDER — FLUTICASONE PROPIONATE 110 UG/1
4 AEROSOL, METERED RESPIRATORY (INHALATION) 2 TIMES DAILY
Qty: 12 G | Refills: 0 | Status: SHIPPED | OUTPATIENT
Start: 2022-08-17 | End: 2022-09-13

## 2022-08-17 NOTE — PROGRESS NOTES
COVID-19 Outpatient Progress Note    Assessment/Plan:    Problem List Items Addressed This Visit    None     Visit Diagnoses     COVID-19    -  Primary    Relevant Medications    fluticasone (FLOVENT HFA) 110 MCG/ACT inhaler    Other Relevant Orders    XR chest pa & lateral    Cough        Relevant Medications    Promethazine-DM (PHENERGAN-DM) 6 25-15 mg/5 mL oral syrup    Other Relevant Orders    XR chest pa & lateral         Disposition:     I recommended continued isolation until at least 24 hours have passed since recovery defined as resolution of fever without the use of fever-reducing medications AND improvement in COVID symptoms AND 10 days have passed since onset of symptoms (or 10 days have passed since date of first positive viral diagnostic test for asymptomatic patients)  Discussed symptom directed medication options with patient  Discussed vitamin D, vitamin C, and/or zinc supplementation with patient  Will get CXR at this time, add Flovent as well     I have spent 15 minutes directly with the patient  Greater than 50% of this time was spent in counseling/coordination of care regarding: instructions for management and impressions  Encounter provider Yolanda Campuzano MD    Provider located at [address] [place] [id number] [phone]    Recent Visits  Date Type Provider Dept   08/13/22 Telemedicine Rosa Irwin MD [facility]   08/11/22 Telephone Gracia Combs [address] [facility]   Showing recent visits within past 7 days and meeting all other requirements  Today's Visits  Date Type Provider Dept   08/17/22 Office Visit Yolanda Campuzano MD [facility]   Showing today's visits and meeting all other requirements  Future Appointments  No visits were found meeting these conditions    Showing future appointments within next 150 days and meeting all other requirements       Subjective:   Tilton Nissen is a 55 y o  female who has been screened for COVID-19  Symptom change since last report: unchanged  Patient's symptoms include fatigue, malaise, nasal congestion, rhinorrhea, cough, shortness of breath, chest tightness and nausea  Patient denies fever, chills, sore throat, anosmia, loss of taste, abdominal pain, vomiting, diarrhea and myalgias  - Date of symptom onset: 8/10/2022  - Date of positive COVID-19 test: 8/11/2022  Type of test: PCR  COVID-19 vaccination status: Fully vaccinated with booster    Todd Mosley has been staying home and has isolated themselves in her home  She is taking care to not share personal items and is cleaning all surfaces that are touched often, like counters, tabletops, and doorknobs using household cleaning sprays or wipes  She is wearing a mask when she leaves her room  51yo female here for covid follow up  Symptoms began 1 week ago  Still having sinus congestion, pressure, chest tightness and SOB when going up steps  Finishes paxlovid today  No fevers/chills       Lab Results   Component Value Date    SARSCOV2 Positive (A) 08/11/2022    SARSCOV2 Not Detected 12/29/2020     Past Medical History:   Diagnosis Date    Chronic back pain     secondary to spinal stenosis    Environmental allergies     GERD (gastroesophageal reflux disease)     Hypertension     Spinal stenosis      Past Surgical History:   Procedure Laterality Date    CHOLECYSTECTOMY      GASTRIC BYPASS  2011     Current Outpatient Medications   Medication Sig Dispense Refill    ascorbic acid (VITAMIN C) 250 mg tablet Take 1 tablet (250 mg total) by mouth daily With FeSO4 90 tablet 1    azelastine (ASTELIN) 0 1 % nasal spray 2 sprays into each nostril 2 (two) times a day Use in each nostril as directed 30 mL 1    cetirizine (ZyrTEC) 10 mg tablet       clobetasol (TEMOVATE) 0 05 % cream Apply topically 2 (two) times a day 60 g 1    dicyclomine (BENTYL) 10 mg capsule Take 1 capsule (10 mg total) by mouth 4 (four) times a day as needed (abdominal pain) 120 capsule 5    DULoxetine (CYMBALTA) 60 mg delayed release capsule Take 2 capsules (120 mg total) by mouth daily 180 capsule 1    ergocalciferol (VITAMIN D2) 50,000 units Take 1 capsule (50,000 Units total) by mouth once a week for 12 doses 12 capsule 3    esomeprazole (NexIUM) 20 mg capsule Take 1 capsule (20 mg total) by mouth daily in the early morning 30 capsule 11    famotidine (PEPCID) 40 MG tablet Take 1 tablet (40 mg total) by mouth daily at bedtime as needed for heartburn 30 tablet 1    ferrous sulfate 324 (65 Fe) mg Take 2-3 tabs every other day with orange juice or vitamin C 90 tablet 1    fluticasone (FLOVENT HFA) 110 MCG/ACT inhaler Inhale 4 puffs 2 (two) times a day for 14 days Rinse mouth after use  12 g 0    folic acid (FOLVITE) 418 mcg tablet Take 1 tablet (400 mcg total) by mouth daily 90 tablet 0    metoprolol succinate (TOPROL-XL) 50 mg 24 hr tablet Take 1 tablet (50 mg total) by mouth daily 90 tablet 0    nirmatrelvir & ritonavir (Paxlovid) tablet therapy pack Take 3 tablets by mouth 2 (two) times a day for 5 days Take 2 nirmatrelvir tablets + 1 ritonavir tablet together per dose 30 tablet 0    ondansetron (ZOFRAN) 8 mg tablet Take 1 tablet (8 mg total) by mouth every 8 (eight) hours as needed for nausea or vomiting 20 tablet 3    Promethazine-DM (PHENERGAN-DM) 6 25-15 mg/5 mL oral syrup Take 5 mL by mouth 4 (four) times a day as needed for cough 240 mL 1     No current facility-administered medications for this visit  Allergies   Allergen Reactions    Bee Pollen     Pollen Extract        Review of Systems   Constitutional: Positive for fatigue  Negative for chills and fever  HENT: Positive for congestion and rhinorrhea  Negative for sore throat  Respiratory: Positive for cough, chest tightness and shortness of breath  Gastrointestinal: Positive for nausea   Negative for abdominal pain, diarrhea and vomiting  Musculoskeletal: Negative for myalgias  Objective:    Vitals:    08/17/22 1144   BP: 124/68   Pulse: 92   Resp: 18   Temp: 97 7 °F (36 5 °C)   SpO2: 99%   Weight: 121 kg (267 lb 9 6 oz)   Height: 5' 7" (1 702 m)       Physical Exam  Vitals reviewed  Constitutional:       General: She is not in acute distress  Appearance: She is obese  HENT:      Head: Normocephalic and atraumatic  Right Ear: Tympanic membrane, ear canal and external ear normal       Left Ear: Tympanic membrane, ear canal and external ear normal       Nose: Congestion and rhinorrhea present  Rhinorrhea is purulent  Right Turbinates: Swollen  Left Turbinates: Swollen  Right Sinus: Maxillary sinus tenderness present  Left Sinus: Maxillary sinus tenderness present  Mouth/Throat:      Pharynx: No posterior oropharyngeal erythema  Cardiovascular:      Rate and Rhythm: Normal rate and regular rhythm  Heart sounds: Normal heart sounds  No murmur heard  No friction rub  No gallop  Pulmonary:      Effort: Pulmonary effort is normal  No respiratory distress  Breath sounds: Rales present  No wheezing or rhonchi  Neurological:      General: No focal deficit present  Mental Status: She is alert  Psychiatric:         Mood and Affect: Mood and affect normal          Speech: Speech normal          Behavior: Behavior normal  Behavior is cooperative  Thought Content:  Thought content normal          Judgment: Judgment normal

## 2022-09-07 ENCOUNTER — PATIENT MESSAGE (OUTPATIENT)
Dept: FAMILY MEDICINE CLINIC | Facility: CLINIC | Age: 47
End: 2022-09-07

## 2022-09-07 NOTE — TELEPHONE ENCOUNTER
Scheduled date of colonoscopy (as of today):  11/2/22  Physician performing colonoscopy: Dr Calvin  Location of colonoscopy: [place]  Bowel prep reviewed with patient: Miralax/Mag Citrate  Instructions reviewed with patient by: Rosanne/tc  Clearances: N/A

## 2022-09-08 ENCOUNTER — PATIENT MESSAGE (OUTPATIENT)
Dept: FAMILY MEDICINE CLINIC | Facility: CLINIC | Age: 47
End: 2022-09-08

## 2022-09-08 DIAGNOSIS — J02.9 PHARYNGITIS, UNSPECIFIED ETIOLOGY: Primary | ICD-10-CM

## 2022-09-08 RX ORDER — AMOXICILLIN AND CLAVULANATE POTASSIUM 875; 125 MG/1; MG/1
1 TABLET, FILM COATED ORAL EVERY 12 HOURS SCHEDULED
Qty: 10 TABLET | Refills: 0 | Status: SHIPPED | OUTPATIENT
Start: 2022-09-08 | End: 2022-09-13

## 2022-09-08 RX ORDER — PREDNISONE 20 MG/1
40 TABLET ORAL DAILY
Qty: 10 TABLET | Refills: 0 | Status: SHIPPED | OUTPATIENT
Start: 2022-09-08 | End: 2022-09-13

## 2022-09-11 ENCOUNTER — PATIENT MESSAGE (OUTPATIENT)
Dept: FAMILY MEDICINE CLINIC | Facility: CLINIC | Age: 47
End: 2022-09-11

## 2022-09-11 ENCOUNTER — OFFICE VISIT (OUTPATIENT)
Dept: URGENT CARE | Facility: CLINIC | Age: 47
End: 2022-09-11
Payer: COMMERCIAL

## 2022-09-11 ENCOUNTER — APPOINTMENT (OUTPATIENT)
Dept: RADIOLOGY | Facility: CLINIC | Age: 47
End: 2022-09-11
Payer: COMMERCIAL

## 2022-09-11 VITALS — BODY MASS INDEX: 41.91 KG/M2 | HEIGHT: 67 IN | WEIGHT: 267 LBS

## 2022-09-11 DIAGNOSIS — J02.9 SORE THROAT: ICD-10-CM

## 2022-09-11 DIAGNOSIS — R09.81 NASAL CONGESTION: ICD-10-CM

## 2022-09-11 DIAGNOSIS — R05.9 COUGH: Primary | ICD-10-CM

## 2022-09-11 DIAGNOSIS — R05.9 COUGH: ICD-10-CM

## 2022-09-11 PROCEDURE — 99213 OFFICE O/P EST LOW 20 MIN: CPT | Performed by: STUDENT IN AN ORGANIZED HEALTH CARE EDUCATION/TRAINING PROGRAM

## 2022-09-11 PROCEDURE — 71046 X-RAY EXAM CHEST 2 VIEWS: CPT

## 2022-09-11 RX ORDER — AZITHROMYCIN 250 MG/1
TABLET, FILM COATED ORAL
Qty: 6 TABLET | Refills: 0 | Status: SHIPPED | OUTPATIENT
Start: 2022-09-11 | End: 2022-09-15

## 2022-09-11 RX ORDER — LIDOCAINE HYDROCHLORIDE 20 MG/ML
15 SOLUTION OROPHARYNGEAL 4 TIMES DAILY PRN
Qty: 100 ML | Refills: 0 | Status: SHIPPED | OUTPATIENT
Start: 2022-09-11

## 2022-09-11 RX ORDER — BENZONATATE 100 MG/1
100 CAPSULE ORAL 3 TIMES DAILY PRN
Qty: 20 CAPSULE | Refills: 0 | Status: SHIPPED | OUTPATIENT
Start: 2022-09-11

## 2022-09-11 RX ORDER — FLUTICASONE PROPIONATE 50 MCG
2 SPRAY, SUSPENSION (ML) NASAL DAILY
Qty: 11.1 ML | Refills: 0 | Status: SHIPPED | OUTPATIENT
Start: 2022-09-11

## 2022-09-11 NOTE — PROGRESS NOTES
[facility]        NAME: Claudeen Gaudy is a 55 y o  female  : 1975    MRN: [id number]  DATE: 2022  TIME: 9:21 AM    Assessment and Orders   Cough [R05 9]  1  Cough  XR chest pa & lateral    azithromycin (ZITHROMAX) 250 mg tablet    benzonatate (TESSALON PERLES) 100 mg capsule   2  Nasal congestion  fluticasone (FLONASE) 50 mcg/act nasal spray   3  Sore throat  Lidocaine Viscous HCl (XYLOCAINE) 2 % mucosal solution         Plan and Discussion      Crackles appreciated in left lower lung and there is corresponding haziness on xray  Will treat with azithromycin in addition to the Augmentin that she is currently taking  I have also sent in RX for flonase, tessalon and visous lidocaine to help with her symptoms  Discussed reasons to report immediately to ED for evaluation: difficultly breathing    Risks an/d benefits discussed  Patient understands and agrees with the plan  Follow up with PCP  Chief Complaint     Chief Complaint   Patient presents with    Cough    Sore Throat    Nasal Congestion     The symptoms have been going since Tuesday, was prescribed prednisone and Augmentin this past Thursday but nothing has helped  This morning woke up with a bloody nose, coughing up thick mucus  History of Present Illness       HPI     Patient is a 54 yo F who presents with worsening cough, sore throat and congestion  She was given a course of prednisone and Augmentin as of 2022 for pharyngitis  She states she has been compliant with this medication but she continues to get worse  She was positive for COVID 3 weeks ago  Home rapid test last week was negative  Cough is very bothersome to her and is causing a sore throat  She denies fevers       Review of Systems   Review of Systems  As noted in HPI     Current Medications       Current Outpatient Medications:     azithromycin (ZITHROMAX) 250 mg tablet, Take 2 tablets today then 1 tablet daily x 4 days, Disp: 6 tablet, Rfl: 0    benzonatate (TESSALON PERLES) 100 mg capsule, Take 1 capsule (100 mg total) by mouth 3 (three) times a day as needed for cough, Disp: 20 capsule, Rfl: 0    fluticasone (FLONASE) 50 mcg/act nasal spray, 2 sprays into each nostril daily, Disp: 11 1 mL, Rfl: 0    Lidocaine Viscous HCl (XYLOCAINE) 2 % mucosal solution, Swish and spit 15 mL 4 (four) times a day as needed for mouth pain or discomfort (sore throat) Gargle and spit 15 mL 4x a day for throat pain, Disp: 100 mL, Rfl: 0    albuterol (ProAir HFA) 90 mcg/act inhaler, Inhale 2 puffs every 6 (six) hours as needed for wheezing or shortness of breath, Disp: 8 5 g, Rfl: 1    amoxicillin-clavulanate (AUGMENTIN) 875-125 mg per tablet, Take 1 tablet by mouth every 12 (twelve) hours for 5 days, Disp: 10 tablet, Rfl: 0    ascorbic acid (VITAMIN C) 250 mg tablet, Take 1 tablet (250 mg total) by mouth daily With FeSO4, Disp: 90 tablet, Rfl: 1    azelastine (ASTELIN) 0 1 % nasal spray, 2 sprays into each nostril 2 (two) times a day Use in each nostril as directed, Disp: 30 mL, Rfl: 1    cetirizine (ZyrTEC) 10 mg tablet, , Disp: , Rfl:     clobetasol (TEMOVATE) 0 05 % cream, Apply topically 2 (two) times a day, Disp: 60 g, Rfl: 1    dicyclomine (BENTYL) 10 mg capsule, Take 1 capsule (10 mg total) by mouth 4 (four) times a day as needed (abdominal pain), Disp: 120 capsule, Rfl: 5    DULoxetine (CYMBALTA) 60 mg delayed release capsule, Take 2 capsules (120 mg total) by mouth daily, Disp: 180 capsule, Rfl: 1    ergocalciferol (VITAMIN D2) 50,000 units, Take 1 capsule (50,000 Units total) by mouth once a week for 12 doses, Disp: 12 capsule, Rfl: 3    esomeprazole (NexIUM) 20 mg capsule, Take 1 capsule (20 mg total) by mouth daily in the early morning, Disp: 30 capsule, Rfl: 11    famotidine (PEPCID) 40 MG tablet, Take 1 tablet (40 mg total) by mouth daily at bedtime as needed for heartburn, Disp: 30 tablet, Rfl: 1    ferrous sulfate 324 (65 Fe) mg, Take 2-3 tabs every other day with orange juice or vitamin C, Disp: 90 tablet, Rfl: 1    fluticasone (FLOVENT HFA) 110 MCG/ACT inhaler, Inhale 4 puffs 2 (two) times a day for 14 days Rinse mouth after use , Disp: 12 g, Rfl: 0    folic acid (FOLVITE) 443 mcg tablet, Take 1 tablet (400 mcg total) by mouth daily, Disp: 90 tablet, Rfl: 0    metoprolol succinate (TOPROL-XL) 50 mg 24 hr tablet, Take 1 tablet (50 mg total) by mouth daily, Disp: 90 tablet, Rfl: 0    ondansetron (ZOFRAN) 8 mg tablet, Take 1 tablet (8 mg total) by mouth every 8 (eight) hours as needed for nausea or vomiting, Disp: 20 tablet, Rfl: 3    predniSONE 20 mg tablet, Take 2 tablets (40 mg total) by mouth daily for 5 days, Disp: 10 tablet, Rfl: 0    Promethazine-DM (PHENERGAN-DM) 6 25-15 mg/5 mL oral syrup, Take 5 mL by mouth 4 (four) times a day as needed for cough, Disp: 240 mL, Rfl: 1    Current Allergies     Allergies as of 09/11/2022 - Reviewed 09/11/2022   Allergen Reaction Noted    Bee pollen  09/29/2016    Pollen extract  09/29/2016            The following portions of the patient's history were reviewed and updated as appropriate: allergies, current medications, past family history, past medical history, past social history, past surgical history and problem list      Past Medical History:   Diagnosis Date    Chronic back pain     secondary to spinal stenosis    Environmental allergies     GERD (gastroesophageal reflux disease)     Hypertension     Spinal stenosis        Past Surgical History:   Procedure Laterality Date    CHOLECYSTECTOMY      GASTRIC BYPASS  2011       Family History   Problem Relation Age of Onset    Diabetes Mother     Hypertension Mother     Endometrial cancer Mother     Diabetes Father     Hypertension Father     Heart disease Father     Prostate cancer Father          Medications have been verified  Objective   Ht 5' 7" (1 702 m)   Wt 121 kg (267 lb)   BMI 41 82 kg/m²   No LMP recorded  Physical Exam     Physical Exam  Constitutional:       General: She is not in acute distress  HENT:      Right Ear: Tympanic membrane normal       Left Ear: Tympanic membrane normal       Nose: Congestion present  Mouth/Throat:      Mouth: Mucous membranes are moist       Pharynx: Posterior oropharyngeal erythema present  Cardiovascular:      Rate and Rhythm: Normal rate and regular rhythm  Pulmonary:      Effort: Pulmonary effort is normal  No respiratory distress  Breath sounds: Rhonchi (LLL) present  No wheezing  Chest:      Chest wall: No tenderness  Neurological:      General: No focal deficit present  Mental Status: She is alert and oriented to person, place, and time     Psychiatric:         Mood and Affect: Mood normal          Behavior: Behavior normal                [facility]

## 2022-09-13 ENCOUNTER — OFFICE VISIT (OUTPATIENT)
Dept: FAMILY MEDICINE CLINIC | Facility: CLINIC | Age: 47
End: 2022-09-13
Payer: COMMERCIAL

## 2022-09-13 VITALS
WEIGHT: 271 LBS | HEIGHT: 67 IN | TEMPERATURE: 98.1 F | RESPIRATION RATE: 18 BRPM | HEART RATE: 79 BPM | OXYGEN SATURATION: 99 % | SYSTOLIC BLOOD PRESSURE: 120 MMHG | BODY MASS INDEX: 42.53 KG/M2 | DIASTOLIC BLOOD PRESSURE: 78 MMHG

## 2022-09-13 DIAGNOSIS — R05.9 COUGH: ICD-10-CM

## 2022-09-13 DIAGNOSIS — J06.9 VIRAL UPPER RESPIRATORY TRACT INFECTION: ICD-10-CM

## 2022-09-13 DIAGNOSIS — J06.9 VIRAL UPPER RESPIRATORY TRACT INFECTION: Primary | ICD-10-CM

## 2022-09-13 DIAGNOSIS — J02.9 PHARYNGITIS, UNSPECIFIED ETIOLOGY: ICD-10-CM

## 2022-09-13 PROCEDURE — 99213 OFFICE O/P EST LOW 20 MIN: CPT | Performed by: INTERNAL MEDICINE

## 2022-09-13 PROCEDURE — 0241U HB NFCT DS VIR RESP RNA 4 TRGT: CPT | Performed by: INTERNAL MEDICINE

## 2022-09-13 RX ORDER — GUAIFENESIN AND CODEINE PHOSPHATE 100; 10 MG/5ML; MG/5ML
10 SOLUTION ORAL 4 TIMES DAILY PRN
Qty: 236 ML | Refills: 0 | Status: SHIPPED | OUTPATIENT
Start: 2022-09-13

## 2022-09-13 NOTE — LETTER
September 13, 2022     Patient: Mumtaz Alvarez  YOB: 1975  Date of Visit: 9/13/2022      To Whom it May Concern:    Mumtaz Alvarez is under my professional care  Davis Fajardo was seen in my office on 9/13/2022  Davis Fajardo may return to work on 09/19/22  If you have any questions or concerns, please don't hesitate to call           Sincerely,          Nahid Rojas MD        CC: No Recipients

## 2022-09-13 NOTE — PROGRESS NOTES
[facility]        NAME: José Miguel Gonzales is a 55 y o  female  : 1975    MRN: [id number]  DATE: 2022  TIME: 12:04 PM    Assessment and Plan   1  Viral upper respiratory tract infection  -rule out Flu/RSV given persistent symptoms and minimal improvement with augmentin and Z-pack  Advised to call if fevers, chills, worsening cough, sputum or SOB      -   COVID/FLU/RSV; Future  -     guaifenesin-codeine (GUAIFENESIN AC) 100-10 MG/5ML liquid; Take 10 mL by mouth 4 (four) times a day as needed for cough or congestion    2  Pharyngitis, unspecified etiology  -     COVID/FLU/RSV; Future    3  Cough  -     COVID/FLU/RSV; Future  -     guaifenesin-codeine (GUAIFENESIN AC) 100-10 MG/5ML liquid; Take 10 mL by mouth 4 (four) times a day as needed for cough or congestion             Chief Complaint     Chief Complaint   Patient presents with    Cold Like Symptoms     Head, chest congestion, cough, sore throat,          History of Present Illness       51yo female here for acute visit, reports sore throat and sinus congestion for one week  Started augmentin and prednisone Thursday, which she finished yesterday  Went to urgent care  because of worsening symptoms, CXR reportedly normal  Given Z-pack  Feels a little better today, denies fevers but has had chills  No nausea/vomiting or abdominal pain  Sore throat slowly improving, mostly dry cough  Denies chest tightness or SOB  Was watching her 1year old nephew prior to symptom onset  Had covid-19 about a month ago, finally felt better until last week  Review of Systems   Review of Systems   Constitutional: Positive for appetite change, chills and fatigue  Negative for fever  HENT: Positive for congestion, rhinorrhea, sinus pressure and sore throat  Respiratory: Positive for cough  Negative for chest tightness and shortness of breath  Gastrointestinal: Negative for abdominal pain, diarrhea, nausea and vomiting  Neurological: Positive for headaches  Negative for dizziness and light-headedness           Current Medications       Current Outpatient Medications:     albuterol (ProAir HFA) 90 mcg/act inhaler, Inhale 2 puffs every 6 (six) hours as needed for wheezing or shortness of breath, Disp: 8 5 g, Rfl: 1    ascorbic acid (VITAMIN C) 250 mg tablet, Take 1 tablet (250 mg total) by mouth daily With FeSO4, Disp: 90 tablet, Rfl: 1    azelastine (ASTELIN) 0 1 % nasal spray, 2 sprays into each nostril 2 (two) times a day Use in each nostril as directed, Disp: 30 mL, Rfl: 1    azithromycin (ZITHROMAX) 250 mg tablet, Take 2 tablets today then 1 tablet daily x 4 days, Disp: 6 tablet, Rfl: 0    benzonatate (TESSALON PERLES) 100 mg capsule, Take 1 capsule (100 mg total) by mouth 3 (three) times a day as needed for cough, Disp: 20 capsule, Rfl: 0    cetirizine (ZyrTEC) 10 mg tablet, , Disp: , Rfl:     clobetasol (TEMOVATE) 0 05 % cream, Apply topically 2 (two) times a day, Disp: 60 g, Rfl: 1    dicyclomine (BENTYL) 10 mg capsule, Take 1 capsule (10 mg total) by mouth 4 (four) times a day as needed (abdominal pain), Disp: 120 capsule, Rfl: 5    DULoxetine (CYMBALTA) 60 mg delayed release capsule, Take 2 capsules (120 mg total) by mouth daily, Disp: 180 capsule, Rfl: 1    ergocalciferol (VITAMIN D2) 50,000 units, Take 1 capsule (50,000 Units total) by mouth once a week for 12 doses, Disp: 12 capsule, Rfl: 3    esomeprazole (NexIUM) 20 mg capsule, Take 1 capsule (20 mg total) by mouth daily in the early morning, Disp: 30 capsule, Rfl: 11    famotidine (PEPCID) 40 MG tablet, Take 1 tablet (40 mg total) by mouth daily at bedtime as needed for heartburn, Disp: 30 tablet, Rfl: 1    ferrous sulfate 324 (65 Fe) mg, Take 2-3 tabs every other day with orange juice or vitamin C, Disp: 90 tablet, Rfl: 1    fluticasone (FLONASE) 50 mcg/act nasal spray, 2 sprays into each nostril daily, Disp: 11 1 mL, Rfl: 0    folic acid (FOLVITE) 400 mcg tablet, Take 1 tablet (400 mcg total) by mouth daily, Disp: 90 tablet, Rfl: 0    guaifenesin-codeine (GUAIFENESIN AC) 100-10 MG/5ML liquid, Take 10 mL by mouth 4 (four) times a day as needed for cough or congestion, Disp: 236 mL, Rfl: 0    Lidocaine Viscous HCl (XYLOCAINE) 2 % mucosal solution, Swish and spit 15 mL 4 (four) times a day as needed for mouth pain or discomfort (sore throat) Gargle and spit 15 mL 4x a day for throat pain, Disp: 100 mL, Rfl: 0    metoprolol succinate (TOPROL-XL) 50 mg 24 hr tablet, Take 1 tablet (50 mg total) by mouth daily, Disp: 90 tablet, Rfl: 0    ondansetron (ZOFRAN) 8 mg tablet, Take 1 tablet (8 mg total) by mouth every 8 (eight) hours as needed for nausea or vomiting, Disp: 20 tablet, Rfl: 3    amoxicillin-clavulanate (AUGMENTIN) 875-125 mg per tablet, Take 1 tablet by mouth every 12 (twelve) hours for 5 days (Patient not taking: Reported on 9/13/2022), Disp: 10 tablet, Rfl: 0    fluticasone (FLOVENT HFA) 110 MCG/ACT inhaler, Inhale 4 puffs 2 (two) times a day for 14 days Rinse mouth after use   (Patient not taking: Reported on 9/13/2022), Disp: 12 g, Rfl: 0    Current Allergies     Allergies as of 09/13/2022 - Reviewed 09/13/2022   Allergen Reaction Noted    Bee pollen  09/29/2016    Pollen extract  09/29/2016            The following portions of the patient's history were reviewed and updated as appropriate: allergies, current medications, past family history, past medical history, past social history, past surgical history and problem list      Past Medical History:   Diagnosis Date    Chronic back pain     secondary to spinal stenosis    Environmental allergies     GERD (gastroesophageal reflux disease)     Hypertension     Spinal stenosis        Past Surgical History:   Procedure Laterality Date    CHOLECYSTECTOMY      GASTRIC BYPASS  2011       Family History   Problem Relation Age of Onset    Diabetes Mother     Hypertension Mother     Endometrial cancer Mother     Diabetes Father     Hypertension Father     Heart disease Father     Prostate cancer Father          Medications have been verified  Objective   /78   Pulse 79   Temp 98 1 °F (36 7 °C)   Resp 18   Ht 5' 7" (1 702 m)   Wt 123 kg (271 lb)   SpO2 99%   BMI 42 44 kg/m²        Physical Exam     Physical Exam  Vitals reviewed  Constitutional:       General: She is not in acute distress  Appearance: She is obese  HENT:      Head: Normocephalic and atraumatic  Right Ear: Tympanic membrane, ear canal and external ear normal       Left Ear: Tympanic membrane, ear canal and external ear normal       Mouth/Throat:      Pharynx: Posterior oropharyngeal erythema present  No oropharyngeal exudate  Cardiovascular:      Rate and Rhythm: Normal rate and regular rhythm  Heart sounds: Normal heart sounds  No murmur heard  No friction rub  No gallop  Pulmonary:      Effort: Pulmonary effort is normal  No respiratory distress  Breath sounds: Normal breath sounds  No wheezing, rhonchi or rales  Lymphadenopathy:      Cervical: No cervical adenopathy  Neurological:      General: No focal deficit present  Mental Status: She is alert  Psychiatric:         Mood and Affect: Mood normal          Behavior: Behavior normal          Thought Content:  Thought content normal          Judgment: Judgment normal              Results:  Lab Results   Component Value Date    SODIUM 138 04/21/2022    K 4 0 04/21/2022     04/21/2022    CO2 29 04/21/2022    BUN 17 04/21/2022    CREATININE 0 66 04/21/2022    CALCIUM 8 8 04/21/2022       Lab Results   Component Value Date    HGBA1C 6 1 (H) 04/21/2022       Lab Results   Component Value Date    WBC 7 53 04/21/2022    HGB 14 0 04/21/2022    HCT 43 8 04/21/2022    MCV 86 04/21/2022     04/21/2022

## 2022-09-14 LAB
FLUAV RNA RESP QL NAA+PROBE: NEGATIVE
FLUBV RNA RESP QL NAA+PROBE: NEGATIVE
RSV RNA RESP QL NAA+PROBE: NEGATIVE
SARS-COV-2 RNA RESP QL NAA+PROBE: NEGATIVE

## 2022-09-16 ENCOUNTER — PATIENT MESSAGE (OUTPATIENT)
Dept: FAMILY MEDICINE CLINIC | Facility: CLINIC | Age: 47
End: 2022-09-16

## 2022-09-16 DIAGNOSIS — J06.9 VIRAL UPPER RESPIRATORY TRACT INFECTION: ICD-10-CM

## 2022-09-16 DIAGNOSIS — R05.9 COUGH: ICD-10-CM

## 2022-09-16 DIAGNOSIS — U07.1 COVID-19: Primary | ICD-10-CM

## 2022-09-19 ENCOUNTER — ANNUAL EXAM (OUTPATIENT)
Dept: OBGYN CLINIC | Facility: CLINIC | Age: 47
End: 2022-09-19
Payer: COMMERCIAL

## 2022-09-19 ENCOUNTER — APPOINTMENT (OUTPATIENT)
Dept: LAB | Facility: CLINIC | Age: 47
End: 2022-09-19
Payer: COMMERCIAL

## 2022-09-19 ENCOUNTER — APPOINTMENT (OUTPATIENT)
Dept: RADIOLOGY | Facility: CLINIC | Age: 47
End: 2022-09-19
Payer: COMMERCIAL

## 2022-09-19 VITALS
WEIGHT: 267.6 LBS | SYSTOLIC BLOOD PRESSURE: 110 MMHG | DIASTOLIC BLOOD PRESSURE: 74 MMHG | HEIGHT: 67 IN | BODY MASS INDEX: 42 KG/M2

## 2022-09-19 DIAGNOSIS — J06.9 VIRAL UPPER RESPIRATORY TRACT INFECTION: ICD-10-CM

## 2022-09-19 DIAGNOSIS — R53.82 CHRONIC FATIGUE: ICD-10-CM

## 2022-09-19 DIAGNOSIS — U07.1 COVID-19: ICD-10-CM

## 2022-09-19 DIAGNOSIS — Z01.419 ENCOUNTER FOR GYNECOLOGICAL EXAMINATION: Primary | ICD-10-CM

## 2022-09-19 DIAGNOSIS — R05.9 COUGH: ICD-10-CM

## 2022-09-19 DIAGNOSIS — R73.03 PREDIABETES: ICD-10-CM

## 2022-09-19 DIAGNOSIS — Z13.220 SCREENING FOR CHOLESTEROL LEVEL: ICD-10-CM

## 2022-09-19 LAB
ALBUMIN SERPL BCP-MCNC: 3.6 G/DL (ref 3.5–5)
ALP SERPL-CCNC: 130 U/L (ref 46–116)
ALT SERPL W P-5'-P-CCNC: 136 U/L (ref 12–78)
ANION GAP SERPL CALCULATED.3IONS-SCNC: 8 MMOL/L (ref 4–13)
AST SERPL W P-5'-P-CCNC: 26 U/L (ref 5–45)
BASOPHILS # BLD AUTO: 0.08 THOUSANDS/ΜL (ref 0–0.1)
BASOPHILS NFR BLD AUTO: 1 % (ref 0–1)
BILIRUB SERPL-MCNC: 0.42 MG/DL (ref 0.2–1)
BUN SERPL-MCNC: 13 MG/DL (ref 5–25)
CALCIUM SERPL-MCNC: 9.5 MG/DL (ref 8.3–10.1)
CHLORIDE SERPL-SCNC: 104 MMOL/L (ref 96–108)
CO2 SERPL-SCNC: 23 MMOL/L (ref 21–32)
CREAT SERPL-MCNC: 0.62 MG/DL (ref 0.6–1.3)
EOSINOPHIL # BLD AUTO: 0.38 THOUSAND/ΜL (ref 0–0.61)
EOSINOPHIL NFR BLD AUTO: 3 % (ref 0–6)
ERYTHROCYTE [DISTWIDTH] IN BLOOD BY AUTOMATED COUNT: 13.8 % (ref 11.6–15.1)
GFR SERPL CREATININE-BSD FRML MDRD: 108 ML/MIN/1.73SQ M
GLUCOSE SERPL-MCNC: 112 MG/DL (ref 65–140)
HCT VFR BLD AUTO: 44.8 % (ref 34.8–46.1)
HGB BLD-MCNC: 13.8 G/DL (ref 11.5–15.4)
IMM GRANULOCYTES # BLD AUTO: 0.08 THOUSAND/UL (ref 0–0.2)
IMM GRANULOCYTES NFR BLD AUTO: 1 % (ref 0–2)
LYMPHOCYTES # BLD AUTO: 3.82 THOUSANDS/ΜL (ref 0.6–4.47)
LYMPHOCYTES NFR BLD AUTO: 28 % (ref 14–44)
MCH RBC QN AUTO: 26.1 PG (ref 26.8–34.3)
MCHC RBC AUTO-ENTMCNC: 30.8 G/DL (ref 31.4–37.4)
MCV RBC AUTO: 85 FL (ref 82–98)
MONOCYTES # BLD AUTO: 0.86 THOUSAND/ΜL (ref 0.17–1.22)
MONOCYTES NFR BLD AUTO: 6 % (ref 4–12)
NEUTROPHILS # BLD AUTO: 8.28 THOUSANDS/ΜL (ref 1.85–7.62)
NEUTS SEG NFR BLD AUTO: 61 % (ref 43–75)
NRBC BLD AUTO-RTO: 0 /100 WBCS
PLATELET # BLD AUTO: 407 THOUSANDS/UL (ref 149–390)
PMV BLD AUTO: 10.2 FL (ref 8.9–12.7)
POTASSIUM SERPL-SCNC: 4.2 MMOL/L (ref 3.5–5.3)
PROT SERPL-MCNC: 7.4 G/DL (ref 6.4–8.4)
RBC # BLD AUTO: 5.28 MILLION/UL (ref 3.81–5.12)
SODIUM SERPL-SCNC: 135 MMOL/L (ref 135–147)
WBC # BLD AUTO: 13.5 THOUSAND/UL (ref 4.31–10.16)

## 2022-09-19 PROCEDURE — S0612 ANNUAL GYNECOLOGICAL EXAMINA: HCPCS | Performed by: STUDENT IN AN ORGANIZED HEALTH CARE EDUCATION/TRAINING PROGRAM

## 2022-09-19 PROCEDURE — 85025 COMPLETE CBC W/AUTO DIFF WBC: CPT

## 2022-09-19 PROCEDURE — 71046 X-RAY EXAM CHEST 2 VIEWS: CPT

## 2022-09-19 PROCEDURE — 36415 COLL VENOUS BLD VENIPUNCTURE: CPT

## 2022-09-19 PROCEDURE — 80053 COMPREHEN METABOLIC PANEL: CPT

## 2022-09-19 NOTE — PROGRESS NOTES
[facility] [address] [place] [address]    ASSESSMENT/PLAN: Jacinto Mireles is a 55 y o  Tricia Hoadriana who presents for annual gynecologic exam     Encounter for routine gynecologic examination  - Routine well woman exam completed today  - Cervical Cancer Screening: Current ASCCP Guidelines reviewed  Last Pap: 06/07/2021  Next Pap Due: 2026   - HPV Vaccination status: Not immunized  - STI screening offered including HIV: Declines  - Contraceptive counseling discussed  Current contraception: Abstinence  - Breast Cancer Screening: Last Mammogram 01/15/2019, scheduled  - Colon cancer screening scheduled  Additional problems addressed at this visit:  1  Encounter for gynecological examination          CC:  Annual Gynecologic Examination    HPI: Jacinto Mireles is a 55 y o  Tricia Bradley who presents for annual gynecologic examination  She reports no new changes to her health  She reports no breast concerns  She gets regular periods  She has no vaginal discharge, vulvar or vaginal lesions, pelvic pain, or abnormal bleeding  She has no sexual health concerns and is not currently sexually active  She has no symptoms of pelvic organ prolapse, urinary, or fecal incontinence  She denies intimate partner violence  She prefers comprehensive annual pelvic exams because her mother was diagnosed with uterine papillary serous uterine cancer last year  The following portions of the patient's history were reviewed and updated as appropriate: allergies, current medications, past family history, past medical history, obstetric history, gynecologic history, past social history, past surgical history and problem list     Review of Systems   Constitutional: Negative for chills and fever  Respiratory: Negative for cough and shortness of breath  Cardiovascular: Negative for chest pain and leg swelling  Gastrointestinal: Negative for abdominal pain, nausea and vomiting     Genitourinary: Negative for dysuria, frequency and urgency  Neurological: Negative for dizziness, light-headedness and headaches  Objective:  /74   Ht 5' 7" (1 702 m)   Wt 121 kg (267 lb 9 6 oz)   LMP 08/28/2022   BMI 41 91 kg/m²    Physical Exam  Exam conducted with a chaperone present  Constitutional:       Appearance: Normal appearance  HENT:      Head: Normocephalic and atraumatic  Cardiovascular:      Rate and Rhythm: Normal rate  Pulmonary:      Effort: Pulmonary effort is normal    Chest:   Breasts: Breasts are symmetrical       Right: Normal  No swelling, bleeding, inverted nipple, mass, nipple discharge, skin change, tenderness or axillary adenopathy  Left: Normal  No swelling, bleeding, inverted nipple, mass, nipple discharge, skin change, tenderness or axillary adenopathy  Abdominal:      General: There is no distension  Tenderness: There is no abdominal tenderness  There is no guarding  Genitourinary:     Exam position: Lithotomy position  Pubic Area: No rash  Labia:         Right: No rash, tenderness or lesion  Left: No rash, tenderness or lesion  Urethra: No prolapse, urethral swelling or urethral lesion  Vagina: Normal  No vaginal discharge, erythema, tenderness, bleeding or lesions  Cervix: No cervical motion tenderness, discharge, friability or erythema  Uterus: Not enlarged, not tender and no uterine prolapse  Adnexa:         Right: No mass, tenderness or fullness  Left: No mass, tenderness or fullness  Lymphadenopathy:      Upper Body:      Right upper body: No axillary adenopathy  Left upper body: No axillary adenopathy  Lower Body: No right inguinal adenopathy  No left inguinal adenopathy  Neurological:      Mental Status: She is alert               Gustavo Negrete MD  [facility]  09/19/22 1:53 PM

## 2022-09-20 ENCOUNTER — PATIENT MESSAGE (OUTPATIENT)
Dept: FAMILY MEDICINE CLINIC | Facility: CLINIC | Age: 47
End: 2022-09-20

## 2022-09-21 ENCOUNTER — OFFICE VISIT (OUTPATIENT)
Dept: FAMILY MEDICINE CLINIC | Facility: CLINIC | Age: 47
End: 2022-09-21
Payer: COMMERCIAL

## 2022-09-21 VITALS
TEMPERATURE: 98.1 F | WEIGHT: 268 LBS | HEIGHT: 67 IN | HEART RATE: 97 BPM | OXYGEN SATURATION: 98 % | SYSTOLIC BLOOD PRESSURE: 126 MMHG | RESPIRATION RATE: 18 BRPM | DIASTOLIC BLOOD PRESSURE: 74 MMHG | BODY MASS INDEX: 42.06 KG/M2

## 2022-09-21 DIAGNOSIS — J02.9 PHARYNGITIS, UNSPECIFIED ETIOLOGY: Primary | ICD-10-CM

## 2022-09-21 DIAGNOSIS — E66.01 CLASS 3 SEVERE OBESITY DUE TO EXCESS CALORIES WITH SERIOUS COMORBIDITY AND BODY MASS INDEX (BMI) OF 40.0 TO 44.9 IN ADULT (HCC): ICD-10-CM

## 2022-09-21 DIAGNOSIS — Z98.84 HISTORY OF ROUX-EN-Y GASTRIC BYPASS: ICD-10-CM

## 2022-09-21 DIAGNOSIS — J30.89 NON-SEASONAL ALLERGIC RHINITIS, UNSPECIFIED TRIGGER: ICD-10-CM

## 2022-09-21 DIAGNOSIS — R74.8 ELEVATED LIVER ENZYMES: ICD-10-CM

## 2022-09-21 DIAGNOSIS — E55.9 VITAMIN D DEFICIENCY: ICD-10-CM

## 2022-09-21 DIAGNOSIS — D50.8 IRON DEFICIENCY ANEMIA SECONDARY TO INADEQUATE DIETARY IRON INTAKE: ICD-10-CM

## 2022-09-21 DIAGNOSIS — E53.8 B12 DEFICIENCY: ICD-10-CM

## 2022-09-21 DIAGNOSIS — R73.03 PREDIABETES: ICD-10-CM

## 2022-09-21 DIAGNOSIS — I10 ESSENTIAL HYPERTENSION: ICD-10-CM

## 2022-09-21 LAB — S PYO AG THROAT QL: NEGATIVE

## 2022-09-21 PROCEDURE — 3725F SCREEN DEPRESSION PERFORMED: CPT | Performed by: INTERNAL MEDICINE

## 2022-09-21 PROCEDURE — 87070 CULTURE OTHR SPECIMN AEROBIC: CPT | Performed by: INTERNAL MEDICINE

## 2022-09-21 PROCEDURE — 99214 OFFICE O/P EST MOD 30 MIN: CPT | Performed by: INTERNAL MEDICINE

## 2022-09-21 PROCEDURE — 87880 STREP A ASSAY W/OPTIC: CPT | Performed by: INTERNAL MEDICINE

## 2022-09-21 RX ORDER — MONTELUKAST SODIUM 10 MG/1
10 TABLET ORAL
Qty: 30 TABLET | Refills: 1 | Status: SHIPPED | OUTPATIENT
Start: 2022-09-21

## 2022-09-21 NOTE — PATIENT INSTRUCTIONS
Please try singulair 10mg daily at bedtime for allergies  Get blood work before follow up next month  If symptoms do not improve in another week, or worsen recommend following up with ENT

## 2022-09-21 NOTE — PROGRESS NOTES
[facility]        NAME: Nehemiah Ro is a 55 y o  female  : 1975    MRN: [id number]  DATE: 2022  TIME: 11:23 AM    Assessment and Plan   1  Pharyngitis, unspecified etiology  -suspect this was viral initially, she did improve with augmentin, steroids and Z-pack  Recurrence could be due to post-nasal drip, culture obtained today  If persistence or worsening would recommend follow up with ENT  -     Throat culture; Future  -     POCT rapid strepA  -     Throat culture    2  Elevated liver enzymes  - recent labs notable for , Alk Phos 130  In setting of pharyngitis, cough, fatigue this could be due to recent covid or another viral illness    3  Non-seasonal allergic rhinitis, unspecified trigger  -     montelukast (SINGULAIR) 10 mg tablet; Take 1 tablet (10 mg total) by mouth daily at bedtime             Chief Complaint     Chief Complaint   Patient presents with    Sore Throat         History of Present Illness       51yo female here for acute visit due to sore throat  Symptoms began on  after she was watching her 1year old nephew  Treated with steroids, augmentin at first  Went to urgent care  and given Zpack due to cough and possible bronchitis  CXR was normal at that time  Still having issues with coughing, sore throat mostly at night and fatigue due to trouble sleeping  Denies fevers/chills, nausea/vomiting or diarrhea  She is using azelastine and zyrtec for seasonal allergies  Review of Systems   Review of Systems   Constitutional: Positive for fatigue  Negative for appetite change, chills and fever  HENT: Positive for congestion, postnasal drip, rhinorrhea and sore throat  Negative for trouble swallowing  Respiratory: Positive for cough  Negative for chest tightness and shortness of breath  Cardiovascular: Negative for chest pain, palpitations and leg swelling  Neurological: Positive for headaches   Negative for dizziness and light-headedness           Current Medications       Current Outpatient Medications:     albuterol (ProAir HFA) 90 mcg/act inhaler, Inhale 2 puffs every 6 (six) hours as needed for wheezing or shortness of breath, Disp: 8 5 g, Rfl: 1    ascorbic acid (VITAMIN C) 250 mg tablet, Take 1 tablet (250 mg total) by mouth daily With FeSO4, Disp: 90 tablet, Rfl: 1    azelastine (ASTELIN) 0 1 % nasal spray, 2 sprays into each nostril 2 (two) times a day Use in each nostril as directed, Disp: 30 mL, Rfl: 1    cetirizine (ZyrTEC) 10 mg tablet, , Disp: , Rfl:     dicyclomine (BENTYL) 10 mg capsule, Take 1 capsule (10 mg total) by mouth 4 (four) times a day as needed (abdominal pain), Disp: 120 capsule, Rfl: 5    DULoxetine (CYMBALTA) 60 mg delayed release capsule, Take 2 capsules (120 mg total) by mouth daily, Disp: 180 capsule, Rfl: 1    esomeprazole (NexIUM) 20 mg capsule, Take 1 capsule (20 mg total) by mouth daily in the early morning, Disp: 30 capsule, Rfl: 11    famotidine (PEPCID) 40 MG tablet, Take 1 tablet (40 mg total) by mouth daily at bedtime as needed for heartburn, Disp: 30 tablet, Rfl: 1    ferrous sulfate 324 (65 Fe) mg, Take 2-3 tabs every other day with orange juice or vitamin C, Disp: 90 tablet, Rfl: 1    folic acid (FOLVITE) 428 mcg tablet, Take 1 tablet (400 mcg total) by mouth daily, Disp: 90 tablet, Rfl: 0    metoprolol succinate (TOPROL-XL) 50 mg 24 hr tablet, Take 1 tablet (50 mg total) by mouth daily, Disp: 90 tablet, Rfl: 0    montelukast (SINGULAIR) 10 mg tablet, Take 1 tablet (10 mg total) by mouth daily at bedtime, Disp: 30 tablet, Rfl: 1    ondansetron (ZOFRAN) 8 mg tablet, Take 1 tablet (8 mg total) by mouth every 8 (eight) hours as needed for nausea or vomiting, Disp: 20 tablet, Rfl: 3    benzonatate (TESSALON PERLES) 100 mg capsule, Take 1 capsule (100 mg total) by mouth 3 (three) times a day as needed for cough (Patient not taking: No sig reported), Disp: 20 capsule, Rfl: 0   clobetasol (TEMOVATE) 0 05 % cream, Apply topically 2 (two) times a day (Patient not taking: No sig reported), Disp: 60 g, Rfl: 1    ergocalciferol (VITAMIN D2) 50,000 units, Take 1 capsule (50,000 Units total) by mouth once a week for 12 doses, Disp: 12 capsule, Rfl: 3    fluticasone (FLONASE) 50 mcg/act nasal spray, 2 sprays into each nostril daily (Patient not taking: No sig reported), Disp: 11 1 mL, Rfl: 0    fluticasone (FLOVENT HFA) 110 MCG/ACT inhaler, Inhale 4 puffs 2 (two) times a day for 14 days Rinse mouth after use  (Patient not taking: Reported on 9/13/2022), Disp: 12 g, Rfl: 0    guaifenesin-codeine (GUAIFENESIN AC) 100-10 MG/5ML liquid, Take 10 mL by mouth 4 (four) times a day as needed for cough or congestion (Patient not taking: No sig reported), Disp: 236 mL, Rfl: 0    Lidocaine Viscous HCl (XYLOCAINE) 2 % mucosal solution, Swish and spit 15 mL 4 (four) times a day as needed for mouth pain or discomfort (sore throat) Gargle and spit 15 mL 4x a day for throat pain (Patient not taking: No sig reported), Disp: 100 mL, Rfl: 0    predniSONE 10 mg tablet, Take 5 tablets (50 mg total) by mouth daily for 3 days, THEN 4 tablets (40 mg total) daily for 3 days, THEN 2 tablets (20 mg total) daily for 3 days, THEN 1 tablet (10 mg total) daily for 3 days  , Disp: 36 tablet, Rfl: 0    Current Allergies     Allergies as of 09/21/2022 - Reviewed 09/21/2022   Allergen Reaction Noted    Bee pollen  09/29/2016    Pollen extract  09/29/2016            The following portions of the patient's history were reviewed and updated as appropriate: allergies, current medications, past family history, past medical history, past social history, past surgical history and problem list      Past Medical History:   Diagnosis Date    Chronic back pain     secondary to spinal stenosis    Environmental allergies     GERD (gastroesophageal reflux disease)     Hypertension     Spinal stenosis        Past Surgical History: Procedure Laterality Date    CHOLECYSTECTOMY      GASTRIC BYPASS  2011       Family History   Problem Relation Age of Onset    Diabetes Mother     Hypertension Mother     Endometrial cancer Mother     Diabetes Father     Hypertension Father     Heart disease Father     Prostate cancer Father     Hypertension Sister     No Known Problems Sister     Hypertension Maternal Grandmother     Heart disease Maternal Grandmother     Diabetes Maternal Grandmother     No Known Problems Maternal Grandfather     Breast cancer Paternal Grandmother     Prostate cancer Paternal Grandfather     Bone cancer Paternal Grandfather          Medications have been verified  Objective   /74   Pulse 97   Temp 98 1 °F (36 7 °C)   Resp 18   Ht 5' 7" (1 702 m)   Wt 122 kg (268 lb)   LMP 08/28/2022   SpO2 98%   BMI 41 97 kg/m²        Physical Exam     Physical Exam  Vitals reviewed  Constitutional:       General: She is not in acute distress  Appearance: She is obese  HENT:      Head: Normocephalic and atraumatic  Right Ear: Tympanic membrane and ear canal normal  No drainage, swelling or tenderness  Tympanic membrane is not erythematous  Left Ear: Tympanic membrane and ear canal normal  No drainage, swelling or tenderness  Tympanic membrane is not erythematous  Mouth/Throat:      Mouth: Mucous membranes are moist  No oral lesions  Pharynx: No pharyngeal swelling, oropharyngeal exudate, posterior oropharyngeal erythema or uvula swelling  Tonsils: No tonsillar exudate  Cardiovascular:      Rate and Rhythm: Normal rate and regular rhythm  Heart sounds: No murmur heard  No friction rub  No gallop  Pulmonary:      Effort: Pulmonary effort is normal  No respiratory distress  Breath sounds: Normal breath sounds  No wheezing, rhonchi or rales  Lymphadenopathy:      Cervical: No cervical adenopathy  Neurological:      General: No focal deficit present  Mental Status: She is alert     Psychiatric:         Mood and Affect: Mood normal          Behavior: Behavior normal              Results:  Lab Results   Component Value Date    SODIUM 135 09/19/2022    K 4 2 09/19/2022     09/19/2022    CO2 23 09/19/2022    BUN 13 09/19/2022    CREATININE 0 62 09/19/2022    GLUC 112 09/19/2022    CALCIUM 9 5 09/19/2022       Lab Results   Component Value Date    HGBA1C 6 1 (H) 04/21/2022       Lab Results   Component Value Date    WBC 13 50 (H) 09/19/2022    HGB 13 8 09/19/2022    HCT 44 8 09/19/2022    MCV 85 09/19/2022     (H) 09/19/2022

## 2022-09-22 ENCOUNTER — PATIENT MESSAGE (OUTPATIENT)
Dept: FAMILY MEDICINE CLINIC | Facility: CLINIC | Age: 47
End: 2022-09-22

## 2022-09-22 DIAGNOSIS — J02.9 PHARYNGITIS, UNSPECIFIED ETIOLOGY: Primary | ICD-10-CM

## 2022-09-22 RX ORDER — PREDNISONE 10 MG/1
TABLET ORAL
Qty: 36 TABLET | Refills: 0 | Status: SHIPPED | OUTPATIENT
Start: 2022-09-22 | End: 2022-10-04

## 2022-09-23 LAB — BACTERIA THROAT CULT: NORMAL

## 2022-09-30 ENCOUNTER — PATIENT MESSAGE (OUTPATIENT)
Dept: FAMILY MEDICINE CLINIC | Facility: CLINIC | Age: 47
End: 2022-09-30

## 2022-10-04 ENCOUNTER — CLINICAL SUPPORT (OUTPATIENT)
Dept: FAMILY MEDICINE CLINIC | Facility: CLINIC | Age: 47
End: 2022-10-04
Payer: COMMERCIAL

## 2022-10-04 DIAGNOSIS — Z23 ENCOUNTER FOR IMMUNIZATION: Primary | ICD-10-CM

## 2022-10-04 PROCEDURE — 90471 IMMUNIZATION ADMIN: CPT

## 2022-10-04 PROCEDURE — 90686 IIV4 VACC NO PRSV 0.5 ML IM: CPT

## 2022-10-15 DIAGNOSIS — I10 ESSENTIAL HYPERTENSION: ICD-10-CM

## 2022-10-15 DIAGNOSIS — G89.29 CHRONIC LOW BACK PAIN, UNSPECIFIED BACK PAIN LATERALITY, UNSPECIFIED WHETHER SCIATICA PRESENT: ICD-10-CM

## 2022-10-15 DIAGNOSIS — M54.50 CHRONIC LOW BACK PAIN, UNSPECIFIED BACK PAIN LATERALITY, UNSPECIFIED WHETHER SCIATICA PRESENT: ICD-10-CM

## 2022-10-17 RX ORDER — DULOXETIN HYDROCHLORIDE 60 MG/1
120 CAPSULE, DELAYED RELEASE ORAL DAILY
Qty: 180 CAPSULE | Refills: 3 | Status: SHIPPED | OUTPATIENT
Start: 2022-10-17

## 2022-10-17 RX ORDER — METOPROLOL SUCCINATE 50 MG/1
50 TABLET, EXTENDED RELEASE ORAL DAILY
Qty: 90 TABLET | Refills: 3 | Status: SHIPPED | OUTPATIENT
Start: 2022-10-17

## 2022-10-17 NOTE — TELEPHONE ENCOUNTER
Patient requesting refill(s) of: Cymbalta 60 mg take 2 caps daily    Last filled: 4/4/2022 #180 x 1  Last appt: 9/21/2022  Next appt: 10/31/2022  [facility]

## 2022-10-17 NOTE — TELEPHONE ENCOUNTER
Patient requesting refill(s) of: metoprolol succinate 50 mg daily    Last filled: 7/29/2022 #90 x 0  Last appt: 9/21/2022   Next appt: 10/31/2022  [facility]

## 2022-10-26 ENCOUNTER — TELEPHONE (OUTPATIENT)
Dept: GASTROENTEROLOGY | Facility: CLINIC | Age: 47
End: 2022-10-26

## 2022-10-26 NOTE — TELEPHONE ENCOUNTER
Patients GI provider:  Dr Campa Cardozo    Number to return call: [phone]    Reason for call: Pt calling to reschedule her colonoscopy  Scheduled procedure/appointment date if applicable: Procedure was cancelled for 11/02/2022

## 2022-11-03 ENCOUNTER — PATIENT MESSAGE (OUTPATIENT)
Dept: FAMILY MEDICINE CLINIC | Facility: CLINIC | Age: 47
End: 2022-11-03

## 2022-11-03 DIAGNOSIS — Z01.84 IMMUNITY STATUS TESTING: Primary | ICD-10-CM

## 2022-11-03 NOTE — TELEPHONE ENCOUNTER
Patient returned my call and is rescheduled to 12/13/22 at [address] with Dr Julio Stearns

## 2022-11-16 DIAGNOSIS — R10.12 LEFT UPPER QUADRANT ABDOMINAL PAIN: ICD-10-CM

## 2022-11-16 DIAGNOSIS — K21.9 GASTROESOPHAGEAL REFLUX DISEASE, UNSPECIFIED WHETHER ESOPHAGITIS PRESENT: ICD-10-CM

## 2022-11-16 DIAGNOSIS — Z98.84 HISTORY OF ROUX-EN-Y GASTRIC BYPASS: ICD-10-CM

## 2022-11-17 ENCOUNTER — APPOINTMENT (OUTPATIENT)
Dept: LAB | Facility: HOSPITAL | Age: 47
End: 2022-11-17

## 2022-11-17 DIAGNOSIS — E55.9 VITAMIN D DEFICIENCY: ICD-10-CM

## 2022-11-17 DIAGNOSIS — E53.8 B12 DEFICIENCY: ICD-10-CM

## 2022-11-17 DIAGNOSIS — Z01.84 IMMUNITY STATUS TESTING: ICD-10-CM

## 2022-11-17 DIAGNOSIS — I10 ESSENTIAL HYPERTENSION: ICD-10-CM

## 2022-11-17 DIAGNOSIS — E66.01 CLASS 3 SEVERE OBESITY DUE TO EXCESS CALORIES WITH SERIOUS COMORBIDITY AND BODY MASS INDEX (BMI) OF 40.0 TO 44.9 IN ADULT (HCC): ICD-10-CM

## 2022-11-17 DIAGNOSIS — D50.8 IRON DEFICIENCY ANEMIA SECONDARY TO INADEQUATE DIETARY IRON INTAKE: ICD-10-CM

## 2022-11-17 DIAGNOSIS — R73.03 PREDIABETES: ICD-10-CM

## 2022-11-17 DIAGNOSIS — R74.8 ELEVATED LIVER ENZYMES: ICD-10-CM

## 2022-11-17 DIAGNOSIS — Z98.84 HISTORY OF ROUX-EN-Y GASTRIC BYPASS: ICD-10-CM

## 2022-11-17 LAB
25(OH)D3 SERPL-MCNC: 24.9 NG/ML (ref 30–100)
ALBUMIN SERPL BCP-MCNC: 3.8 G/DL (ref 3.5–5)
ALP SERPL-CCNC: 88 U/L (ref 46–116)
ALT SERPL W P-5'-P-CCNC: 39 U/L (ref 12–78)
ANION GAP SERPL CALCULATED.3IONS-SCNC: 5 MMOL/L (ref 4–13)
AST SERPL W P-5'-P-CCNC: 15 U/L (ref 5–45)
BASOPHILS # BLD AUTO: 0.05 THOUSANDS/ÂΜL (ref 0–0.1)
BASOPHILS NFR BLD AUTO: 1 % (ref 0–1)
BILIRUB SERPL-MCNC: 0.61 MG/DL (ref 0.2–1)
BUN SERPL-MCNC: 12 MG/DL (ref 5–25)
CALCIUM SERPL-MCNC: 9.3 MG/DL (ref 8.3–10.1)
CHLORIDE SERPL-SCNC: 106 MMOL/L (ref 96–108)
CHOLEST SERPL-MCNC: 195 MG/DL
CO2 SERPL-SCNC: 25 MMOL/L (ref 21–32)
CREAT SERPL-MCNC: 0.65 MG/DL (ref 0.6–1.3)
EOSINOPHIL # BLD AUTO: 0.23 THOUSAND/ÂΜL (ref 0–0.61)
EOSINOPHIL NFR BLD AUTO: 3 % (ref 0–6)
ERYTHROCYTE [DISTWIDTH] IN BLOOD BY AUTOMATED COUNT: 13.2 % (ref 11.6–15.1)
EST. AVERAGE GLUCOSE BLD GHB EST-MCNC: 134 MG/DL
FERRITIN SERPL-MCNC: 10 NG/ML (ref 8–388)
GFR SERPL CREATININE-BSD FRML MDRD: 106 ML/MIN/1.73SQ M
GLUCOSE P FAST SERPL-MCNC: 103 MG/DL (ref 65–99)
HBA1C MFR BLD: 6.3 %
HCT VFR BLD AUTO: 43 % (ref 34.8–46.1)
HDLC SERPL-MCNC: 65 MG/DL
HGB BLD-MCNC: 13.8 G/DL (ref 11.5–15.4)
IMM GRANULOCYTES # BLD AUTO: 0.01 THOUSAND/UL (ref 0–0.2)
IMM GRANULOCYTES NFR BLD AUTO: 0 % (ref 0–2)
IRON SATN MFR SERPL: 28 % (ref 15–50)
IRON SERPL-MCNC: 132 UG/DL (ref 50–170)
LDLC SERPL CALC-MCNC: 101 MG/DL (ref 0–100)
LYMPHOCYTES # BLD AUTO: 3.11 THOUSANDS/ÂΜL (ref 0.6–4.47)
LYMPHOCYTES NFR BLD AUTO: 45 % (ref 14–44)
MCH RBC QN AUTO: 26.1 PG (ref 26.8–34.3)
MCHC RBC AUTO-ENTMCNC: 32.1 G/DL (ref 31.4–37.4)
MCV RBC AUTO: 81 FL (ref 82–98)
MONOCYTES # BLD AUTO: 0.43 THOUSAND/ÂΜL (ref 0.17–1.22)
MONOCYTES NFR BLD AUTO: 6 % (ref 4–12)
NEUTROPHILS # BLD AUTO: 3.15 THOUSANDS/ÂΜL (ref 1.85–7.62)
NEUTS SEG NFR BLD AUTO: 45 % (ref 43–75)
NRBC BLD AUTO-RTO: 0 /100 WBCS
PLATELET # BLD AUTO: 291 THOUSANDS/UL (ref 149–390)
PMV BLD AUTO: 10.1 FL (ref 8.9–12.7)
POTASSIUM SERPL-SCNC: 4 MMOL/L (ref 3.5–5.3)
PROT SERPL-MCNC: 6.9 G/DL (ref 6.4–8.4)
RBC # BLD AUTO: 5.29 MILLION/UL (ref 3.81–5.12)
SODIUM SERPL-SCNC: 136 MMOL/L (ref 135–147)
TIBC SERPL-MCNC: 466 UG/DL (ref 250–450)
TRIGL SERPL-MCNC: 145 MG/DL
TSH SERPL DL<=0.05 MIU/L-ACNC: 0.8 UIU/ML (ref 0.45–4.5)
VIT B12 SERPL-MCNC: 338 PG/ML (ref 100–900)
WBC # BLD AUTO: 6.98 THOUSAND/UL (ref 4.31–10.16)

## 2022-11-18 ENCOUNTER — TELEPHONE (OUTPATIENT)
Dept: FAMILY MEDICINE CLINIC | Facility: CLINIC | Age: 47
End: 2022-11-18

## 2022-11-18 LAB
HBV SURFACE AB SER-ACNC: <3.1 MIU/ML
HCV AB SER QL: NORMAL
MEV IGG SER QL IA: NORMAL
MUV IGG SER QL IA: ABNORMAL
RUBV IGG SERPL IA-ACNC: >175 IU/ML
VZV IGG SER QL IA: NORMAL

## 2022-11-18 NOTE — TELEPHONE ENCOUNTER
Nacho Ross called for her sister Damian Bernabe has been throwing up and diarrhea, cant speak, body aches weakness, chills and hot  She was asking what can she do and take for this    On set 4 am 11/18/2022    Please advise    Call her sister Nacho Ross [phone]

## 2022-11-21 ENCOUNTER — PATIENT MESSAGE (OUTPATIENT)
Dept: FAMILY MEDICINE CLINIC | Facility: CLINIC | Age: 47
End: 2022-11-21

## 2022-11-21 DIAGNOSIS — R10.12 LEFT UPPER QUADRANT ABDOMINAL PAIN: ICD-10-CM

## 2022-11-21 DIAGNOSIS — J30.89 NON-SEASONAL ALLERGIC RHINITIS, UNSPECIFIED TRIGGER: ICD-10-CM

## 2022-11-21 RX ORDER — MONTELUKAST SODIUM 10 MG/1
10 TABLET ORAL
Qty: 90 TABLET | Refills: 1 | Status: SHIPPED | OUTPATIENT
Start: 2022-11-21

## 2022-11-21 RX ORDER — ONDANSETRON HYDROCHLORIDE 8 MG/1
8 TABLET, FILM COATED ORAL EVERY 8 HOURS PRN
Qty: 20 TABLET | Refills: 3 | Status: SHIPPED | OUTPATIENT
Start: 2022-11-21

## 2022-11-21 NOTE — TELEPHONE ENCOUNTER
Patient requesting refill(s) of: Montelukast     Last filled: 9/21/22  Last appt: 9/21/22  Next appt: 11/22/22  Pharmacy: [facility]

## 2022-11-22 ENCOUNTER — OFFICE VISIT (OUTPATIENT)
Dept: FAMILY MEDICINE CLINIC | Facility: CLINIC | Age: 47
End: 2022-11-22

## 2022-11-22 VITALS
HEIGHT: 67 IN | DIASTOLIC BLOOD PRESSURE: 78 MMHG | HEART RATE: 96 BPM | SYSTOLIC BLOOD PRESSURE: 122 MMHG | RESPIRATION RATE: 17 BRPM | TEMPERATURE: 98.7 F | BODY MASS INDEX: 42.09 KG/M2 | OXYGEN SATURATION: 97 % | WEIGHT: 268.2 LBS

## 2022-11-22 DIAGNOSIS — Z00.00 ANNUAL PHYSICAL EXAM: Primary | ICD-10-CM

## 2022-11-22 DIAGNOSIS — M54.50 CHRONIC LOW BACK PAIN, UNSPECIFIED BACK PAIN LATERALITY, UNSPECIFIED WHETHER SCIATICA PRESENT: ICD-10-CM

## 2022-11-22 DIAGNOSIS — Z23 ENCOUNTER FOR IMMUNIZATION: ICD-10-CM

## 2022-11-22 DIAGNOSIS — K21.9 GASTROESOPHAGEAL REFLUX DISEASE, UNSPECIFIED WHETHER ESOPHAGITIS PRESENT: ICD-10-CM

## 2022-11-22 DIAGNOSIS — R73.03 PREDIABETES: ICD-10-CM

## 2022-11-22 DIAGNOSIS — D50.9 IRON DEFICIENCY ANEMIA, UNSPECIFIED IRON DEFICIENCY ANEMIA TYPE: ICD-10-CM

## 2022-11-22 DIAGNOSIS — Z23 NEED FOR HEPATITIS B BOOSTER VACCINATION: ICD-10-CM

## 2022-11-22 DIAGNOSIS — G89.29 CHRONIC LOW BACK PAIN, UNSPECIFIED BACK PAIN LATERALITY, UNSPECIFIED WHETHER SCIATICA PRESENT: ICD-10-CM

## 2022-11-22 DIAGNOSIS — I10 ESSENTIAL HYPERTENSION: ICD-10-CM

## 2022-11-22 DIAGNOSIS — E66.01 CLASS 3 SEVERE OBESITY DUE TO EXCESS CALORIES WITH SERIOUS COMORBIDITY AND BODY MASS INDEX (BMI) OF 40.0 TO 44.9 IN ADULT (HCC): ICD-10-CM

## 2022-11-22 DIAGNOSIS — E55.9 VITAMIN D DEFICIENCY: ICD-10-CM

## 2022-11-22 DIAGNOSIS — E53.8 B12 DEFICIENCY: ICD-10-CM

## 2022-11-22 DIAGNOSIS — G47.19 DAYTIME HYPERSOMNOLENCE: ICD-10-CM

## 2022-11-22 RX ORDER — CYANOCOBALAMIN 1000 UG/ML
1000 INJECTION, SOLUTION INTRAMUSCULAR; SUBCUTANEOUS
Qty: 10 ML | Refills: 1 | Status: SHIPPED | OUTPATIENT
Start: 2022-11-22

## 2022-11-22 RX ORDER — DULOXETIN HYDROCHLORIDE 60 MG/1
60 CAPSULE, DELAYED RELEASE ORAL DAILY
Qty: 180 CAPSULE | Refills: 3
Start: 2022-11-22

## 2022-11-22 RX ORDER — ERGOCALCIFEROL 1.25 MG/1
50000 CAPSULE ORAL
Qty: 12 CAPSULE | Refills: 3 | Status: SHIPPED | OUTPATIENT
Start: 2022-11-22 | End: 2023-04-26

## 2022-11-22 RX ORDER — FERROUS SULFATE TAB EC 324 MG (65 MG FE EQUIVALENT) 324 (65 FE) MG
TABLET DELAYED RESPONSE ORAL
Qty: 90 TABLET | Refills: 1
Start: 2022-11-22

## 2022-11-22 NOTE — PROGRESS NOTES
[facility]        NAME: Vasile Gunter is a 52 y o  female  : 1975    MRN: [id number]  DATE: 2022  TIME: 11:27 AM    Assessment and Plan   1  Annual physical exam    2  Essential hypertension  -     CBC and differential; Future; Expected date: 2023  -     Comprehensive metabolic panel; Future; Expected date: 2023    3  Prediabetes    4  Class 3 severe obesity due to excess calories with serious comorbidity and body mass index (BMI) of 40 0 to 44 9 in [facility])  -     Ambulatory Referral to Sleep Medicine; Future  -     CBC and differential; Future; Expected date: 2023  -     Comprehensive metabolic panel; Future; Expected date: 2023    5  Gastroesophageal reflux disease, unspecified whether esophagitis present    6  Vitamin D deficiency  -     ergocalciferol (VITAMIN D2) 50,000 units; Take 1 capsule (50,000 Units total) by mouth every 14 (fourteen) days for 12 doses  -     Vitamin D 25 hydroxy; Future; Expected date: 2023    7  B12 deficiency  -     cyanocobalamin 1,000 mcg/mL; Inject 1 mL (1,000 mcg total) into a muscle every 30 (thirty) days  -     Insulin Syringe-Needle U-100 30G 1 ML MISC; Use every 30 (thirty) days for 1 dose For use with IM B12  -     Vitamin B12; Future; Expected date: 2023    8  Iron deficiency anemia, unspecified iron deficiency anemia type  -     ferrous sulfate 324 (65 Fe) mg; Take 2-3 tabs every other day with orange juice or vitamin C  -     Iron Panel (Includes Ferritin, Iron Sat%, Iron, and TIBC); Future; Expected date: 2023    9  Chronic low back pain, unspecified back pain laterality, unspecified whether sciatica present  -     DULoxetine (CYMBALTA) 60 mg delayed release capsule; Take 1 capsule (60 mg total) by mouth daily    10  Daytime hypersomnolence  -     Ambulatory Referral to Sleep Medicine; Future    11  Need for hepatitis B booster vaccination  -     Hepatitis B surface antibody;  Future; Expected date: 12/22/2022    12  Encounter for immunization  -     HEPATITIS B VACCINE ADULT IM  -     MMR VACCINE SQ           Chief Complaint     Chief Complaint   Patient presents with   • Physical Exam         History of Present Illness       53yo female with history of HTN, pre-diabetes, Toni en Y gastric bypass and iron deficiency anemia here for follow up/annual physical  Reports a lot of fatigue and low energy lately, despite adequate sleep  States that she often falls asleep while watching television but never occurs while driving or at work  Denies any prior history of loud snoring or sleep apnea  She is taking oral iron with vitamin C once daily, also oral B12 and finished high-dose weekly D2  Has mammogram and colonoscopy scheduled  Back pain: this seems better, would like to try tapering cymbalta, currently 120mg daily  Review of Systems   Review of Systems   Constitutional: Positive for activity change and fatigue  Negative for chills, fever and unexpected weight change  HENT: Negative for congestion, postnasal drip, rhinorrhea, sore throat and trouble swallowing  Eyes: Negative for pain, redness, itching and visual disturbance  Respiratory: Negative for cough, chest tightness, shortness of breath and wheezing  Cardiovascular: Negative for chest pain, palpitations and leg swelling  Gastrointestinal: Positive for nausea  Negative for abdominal pain, blood in stool, constipation, diarrhea and vomiting  Endocrine: Negative for cold intolerance, heat intolerance, polydipsia and polyuria  Genitourinary: Negative for dysuria, frequency, hematuria and urgency  Musculoskeletal: Negative for arthralgias, back pain and joint swelling  Skin: Negative for rash  Neurological: Negative for dizziness, tremors, weakness, light-headedness, numbness and headaches  Psychiatric/Behavioral: Positive for sleep disturbance   Negative for confusion, dysphoric mood, hallucinations and suicidal ideas  The patient is not nervous/anxious            Current Medications       Current Outpatient Medications:   •  albuterol (ProAir HFA) 90 mcg/act inhaler, Inhale 2 puffs every 6 (six) hours as needed for wheezing or shortness of breath, Disp: 8 5 g, Rfl: 1  •  ascorbic acid (VITAMIN C) 250 mg tablet, Take 1 tablet (250 mg total) by mouth daily With FeSO4, Disp: 90 tablet, Rfl: 1  •  azelastine (ASTELIN) 0 1 % nasal spray, 2 sprays into each nostril 2 (two) times a day Use in each nostril as directed, Disp: 30 mL, Rfl: 1  •  cetirizine (ZyrTEC) 10 mg tablet, , Disp: , Rfl:   •  cyanocobalamin 1,000 mcg/mL, Inject 1 mL (1,000 mcg total) into a muscle every 30 (thirty) days, Disp: 10 mL, Rfl: 1  •  dicyclomine (BENTYL) 10 mg capsule, Take 1 capsule (10 mg total) by mouth 4 (four) times a day as needed (abdominal pain), Disp: 120 capsule, Rfl: 5  •  DULoxetine (CYMBALTA) 60 mg delayed release capsule, Take 1 capsule (60 mg total) by mouth daily, Disp: 180 capsule, Rfl: 3  •  ergocalciferol (VITAMIN D2) 50,000 units, Take 1 capsule (50,000 Units total) by mouth every 14 (fourteen) days for 12 doses, Disp: 12 capsule, Rfl: 3  •  esomeprazole (NexIUM) 20 mg capsule, TAKE ONE CAPSULE BY MOUTH EVERY DAY IN THE EARLY MORNING, Disp: 30 capsule, Rfl: 11  •  famotidine (PEPCID) 40 MG tablet, Take 1 tablet (40 mg total) by mouth daily at bedtime as needed for heartburn, Disp: 30 tablet, Rfl: 1  •  ferrous sulfate 324 (65 Fe) mg, Take 2-3 tabs every other day with orange juice or vitamin C, Disp: 90 tablet, Rfl: 1  •  folic acid (FOLVITE) 319 mcg tablet, Take 1 tablet (400 mcg total) by mouth daily, Disp: 90 tablet, Rfl: 0  •  Insulin Syringe-Needle U-100 30G 1 ML MISC, Use every 30 (thirty) days for 1 dose For use with IM B12, Disp: 100 each, Rfl: 1  •  metoprolol succinate (TOPROL-XL) 50 mg 24 hr tablet, Take 1 tablet (50 mg total) by mouth daily, Disp: 90 tablet, Rfl: 3  •  montelukast (SINGULAIR) 10 mg tablet, Take 1 tablet (10 mg total) by mouth daily at bedtime, Disp: 90 tablet, Rfl: 1  •  ondansetron (ZOFRAN) 8 mg tablet, Take 1 tablet (8 mg total) by mouth every 8 (eight) hours as needed for nausea or vomiting, Disp: 20 tablet, Rfl: 3  •  clobetasol (TEMOVATE) 0 05 % cream, Apply topically 2 (two) times a day (Patient not taking: No sig reported), Disp: 60 g, Rfl: 1  •  fluticasone (FLONASE) 50 mcg/act nasal spray, 2 sprays into each nostril daily (Patient not taking: No sig reported), Disp: 11 1 mL, Rfl: 0    Current Allergies     Allergies as of 11/22/2022 - Reviewed 11/22/2022   Allergen Reaction Noted   • Bee pollen  09/29/2016   • Pollen extract  09/29/2016            The following portions of the patient's history were reviewed and updated as appropriate: allergies, current medications, past family history, past medical history, past social history, past surgical history and problem list      Past Medical History:   Diagnosis Date   • Chronic back pain     secondary to spinal stenosis   • Environmental allergies    • GERD (gastroesophageal reflux disease)    • Hypertension    • Spinal stenosis        Past Surgical History:   Procedure Laterality Date   • CHOLECYSTECTOMY     • GASTRIC BYPASS  2011       Family History   Problem Relation Age of Onset   • Diabetes Mother    • Hypertension Mother    • Endometrial cancer Mother    • Diabetes Father    • Hypertension Father    • Heart disease Father    • Prostate cancer Father    • Hypertension Sister    • No Known Problems Sister    • Hypertension Maternal Grandmother    • Heart disease Maternal Grandmother    • Diabetes Maternal Grandmother    • No Known Problems Maternal Grandfather    • Breast cancer Paternal Grandmother    • Prostate cancer Paternal Grandfather    • Bone cancer Paternal Grandfather          Medications have been verified          Objective   /78   Pulse 96   Temp 98 7 °F (37 1 °C)   Resp 17   Ht 5' 7" (1 702 m)   Wt 122 kg (268 lb 3 2 oz) SpO2 97%   BMI 42 01 kg/m²        Physical Exam     Physical Exam  Vitals reviewed  Constitutional:       General: She is not in acute distress  Appearance: She is obese  HENT:      Head: Normocephalic and atraumatic  Right Ear: Tympanic membrane, ear canal and external ear normal       Left Ear: Tympanic membrane, ear canal and external ear normal       Mouth/Throat:      Pharynx: No oropharyngeal exudate or posterior oropharyngeal erythema  Eyes:      General: No scleral icterus  Conjunctiva/sclera: Conjunctivae normal    Cardiovascular:      Rate and Rhythm: Normal rate and regular rhythm  Heart sounds: No murmur heard  No friction rub  No gallop  Pulmonary:      Effort: Pulmonary effort is normal  No respiratory distress  Breath sounds: Normal breath sounds  No wheezing, rhonchi or rales  Abdominal:      General: Abdomen is flat  Bowel sounds are normal  There is no distension  Palpations: Abdomen is soft  There is no mass  Tenderness: There is no abdominal tenderness  There is no guarding or rebound  Hernia: No hernia is present  Lymphadenopathy:      Cervical: No cervical adenopathy  Neurological:      General: No focal deficit present  Mental Status: She is alert  Motor: No weakness  Psychiatric:         Mood and Affect: Mood normal          Behavior: Behavior normal          Thought Content:  Thought content normal          Judgment: Judgment normal              Results:  Lab Results   Component Value Date    SODIUM 136 11/17/2022    K 4 0 11/17/2022     11/17/2022    CO2 25 11/17/2022    BUN 12 11/17/2022    CREATININE 0 65 11/17/2022    GLUC 112 09/19/2022    CALCIUM 9 3 11/17/2022       Lab Results   Component Value Date    HGBA1C 6 3 (H) 11/17/2022       Lab Results   Component Value Date    WBC 6 98 11/17/2022    HGB 13 8 11/17/2022    HCT 43 0 11/17/2022    MCV 81 (L) 11/17/2022     11/17/2022

## 2022-11-22 NOTE — PROGRESS NOTES
ADULT ANNUAL [facility]    NAME: Fede Alexander  AGE: 52 y o  SEX: female  : 1975     DATE: 2022     Assessment and Plan:     Problem List Items Addressed This Visit        Digestive    Gastroesophageal reflux disease       Cardiovascular and Mediastinum    Essential hypertension    Relevant Orders    CBC and differential    Comprehensive metabolic panel       Other    Class 3 severe obesity due to excess calories with serious comorbidity and body mass index (BMI) of 40 0 to 44 9 in adult [facility])    Relevant Orders    Ambulatory Referral to Sleep Medicine    CBC and differential    Comprehensive metabolic panel    Prediabetes    Vitamin D deficiency    Relevant Medications    ergocalciferol (VITAMIN D2) 50,000 units    Other Relevant Orders    Vitamin D 25 hydroxy    B12 deficiency    Relevant Medications    cyanocobalamin 1,000 mcg/mL    Insulin Syringe-Needle U-100 30G 1 ML MISC    Other Relevant Orders    Vitamin B12   Other Visit Diagnoses     Annual physical exam    -  Primary    Iron deficiency anemia, unspecified iron deficiency anemia type        Relevant Medications    ferrous sulfate 324 (65 Fe) mg    cyanocobalamin 1,000 mcg/mL    Other Relevant Orders    Iron Panel (Includes Ferritin, Iron Sat%, Iron, and TIBC)    Chronic low back pain, unspecified back pain laterality, unspecified whether sciatica present        Relevant Medications    DULoxetine (CYMBALTA) 60 mg delayed release capsule    Daytime hypersomnolence        Relevant Orders    Ambulatory Referral to Sleep Medicine    Need for hepatitis B booster vaccination        Relevant Orders    Hepatitis B surface antibody    Encounter for immunization        Relevant Orders    HEPATITIS B VACCINE ADULT IM (Completed)    MMR VACCINE SQ (Completed)          Immunizations and preventive care screenings were discussed with patient today   Appropriate education was printed on patient's after visit summary  Counseling:  Alcohol/drug use: discussed moderation in alcohol intake, the recommendations for healthy alcohol use, and avoidance of illicit drug use  Dental Health: discussed importance of regular tooth brushing, flossing, and dental visits  Injury prevention: discussed safety/seat belts, safety helmets, smoke detectors, carbon dioxide detectors, and smoking near bedding or upholstery  Sexual health: discussed sexually transmitted diseases, partner selection, use of condoms, avoidance of unintended pregnancy, and contraceptive alternatives  Exercise: the importance of regular exercise/physical activity was discussed  Recommend exercise 3-5 times per week for at least 30 minutes  BMI Counseling: Body mass index is 42 01 kg/m²  The BMI is above normal  Nutrition recommendations include decreasing portion sizes  Exercise recommendations include moderate physical activity 150 minutes/week  Rationale for BMI follow-up plan is due to patient being overweight or obese  Depression Screening and Follow-up Plan: Patient was screened for depression during today's encounter  They screened negative with a PHQ-2 score of 0  Return in about 6 months (around 5/22/2023) for Recheck  Chief Complaint:     Chief Complaint   Patient presents with   • Physical Exam      History of Present Illness:     Adult Annual Physical   Patient here for a comprehensive physical exam  The patient reports problems - refer to separate note  Diet and Physical Activity  Diet/Nutrition: well balanced diet  Exercise: no formal exercise  Depression Screening  PHQ-2/9 Depression Screening    Little interest or pleasure in doing things: 0 - not at all  Feeling down, depressed, or hopeless: 0 - not at all  PHQ-2 Score: 0  PHQ-2 Interpretation: Negative depression screen       General Health  Sleep: sleeps well, experiences daytime hypersomnolence and unrefreshing sleep     Hearing: normal - bilateral   Vision: goes for regular eye exams  Dental: regular dental visits  /GYN Health  Patient is: perimenopausal  Last menstrual period:   Contraceptive method: IUD placement       Review of Systems:     Review of Systems   Past Medical History:     Past Medical History:   Diagnosis Date   • Chronic back pain     secondary to spinal stenosis   • Environmental allergies    • GERD (gastroesophageal reflux disease)    • Hypertension    • Spinal stenosis       Past Surgical History:     Past Surgical History:   Procedure Laterality Date   • CHOLECYSTECTOMY     • GASTRIC BYPASS  2011      Social History:     Social History     Socioeconomic History   • Marital status: Single     Spouse name: None   • Number of children: None   • Years of education: None   • Highest education level: None   Occupational History   • None   Tobacco Use   • Smoking status: Never   • Smokeless tobacco: Never   Vaping Use   • Vaping Use: Never used   Substance and Sexual Activity   • Alcohol use: No   • Drug use: No   • Sexual activity: Not Currently     Birth control/protection: None   Other Topics Concern   • None   Social History Narrative   • None     Social Determinants of Health     Financial Resource Strain: Not on file   Food Insecurity: Not on file   Transportation Needs: Not on file   Physical Activity: Not on file   Stress: Not on file   Social Connections: Not on file   Intimate Partner Violence: Not on file   Housing Stability: Not on file      Family History:     Family History   Problem Relation Age of Onset   • Diabetes Mother    • Hypertension Mother    • Endometrial cancer Mother    • Diabetes Father    • Hypertension Father    • Heart disease Father    • Prostate cancer Father    • Hypertension Sister    • No Known Problems Sister    • Hypertension Maternal Grandmother    • Heart disease Maternal Grandmother    • Diabetes Maternal Grandmother    • No Known Problems Maternal Grandfather    • Breast cancer Paternal Grandmother    • Prostate cancer Paternal Grandfather    • Bone cancer Paternal Grandfather       Current Medications:     Current Outpatient Medications   Medication Sig Dispense Refill   • albuterol (ProAir HFA) 90 mcg/act inhaler Inhale 2 puffs every 6 (six) hours as needed for wheezing or shortness of breath 8 5 g 1   • ascorbic acid (VITAMIN C) 250 mg tablet Take 1 tablet (250 mg total) by mouth daily With FeSO4 90 tablet 1   • azelastine (ASTELIN) 0 1 % nasal spray 2 sprays into each nostril 2 (two) times a day Use in each nostril as directed 30 mL 1   • cetirizine (ZyrTEC) 10 mg tablet      • cyanocobalamin 1,000 mcg/mL Inject 1 mL (1,000 mcg total) into a muscle every 30 (thirty) days 10 mL 1   • dicyclomine (BENTYL) 10 mg capsule Take 1 capsule (10 mg total) by mouth 4 (four) times a day as needed (abdominal pain) 120 capsule 5   • DULoxetine (CYMBALTA) 60 mg delayed release capsule Take 1 capsule (60 mg total) by mouth daily 180 capsule 3   • ergocalciferol (VITAMIN D2) 50,000 units Take 1 capsule (50,000 Units total) by mouth every 14 (fourteen) days for 12 doses 12 capsule 3   • esomeprazole (NexIUM) 20 mg capsule TAKE ONE CAPSULE BY MOUTH EVERY DAY IN THE EARLY MORNING 30 capsule 11   • famotidine (PEPCID) 40 MG tablet Take 1 tablet (40 mg total) by mouth daily at bedtime as needed for heartburn 30 tablet 1   • ferrous sulfate 324 (65 Fe) mg Take 2-3 tabs every other day with orange juice or vitamin C 90 tablet 1   • folic acid (FOLVITE) 306 mcg tablet Take 1 tablet (400 mcg total) by mouth daily 90 tablet 0   • Insulin Syringe-Needle U-100 30G 1 ML MISC Use every 30 (thirty) days for 1 dose For use with IM B12 100 each 1   • metoprolol succinate (TOPROL-XL) 50 mg 24 hr tablet Take 1 tablet (50 mg total) by mouth daily 90 tablet 3   • montelukast (SINGULAIR) 10 mg tablet Take 1 tablet (10 mg total) by mouth daily at bedtime 90 tablet 1   • ondansetron (ZOFRAN) 8 mg tablet Take 1 tablet (8 mg total) by mouth every 8 (eight) hours as needed for nausea or vomiting 20 tablet 3   • clobetasol (TEMOVATE) 0 05 % cream Apply topically 2 (two) times a day (Patient not taking: No sig reported) 60 g 1   • fluticasone (FLONASE) 50 mcg/act nasal spray 2 sprays into each nostril daily (Patient not taking: No sig reported) 11 1 mL 0     No current facility-administered medications for this visit  Allergies:      Allergies   Allergen Reactions   • Bee Pollen    • Pollen Extract       Physical Exam:     /78   Pulse 96   Temp 98 7 °F (37 1 °C)   Resp 17   Ht 5' 7" (1 702 m)   Wt 122 kg (268 lb 3 2 oz)   SpO2 97%   BMI 42 01 kg/m²     Physical Exam     Rosa Lowry MD  [place] [address]

## 2022-11-22 NOTE — PATIENT INSTRUCTIONS

## 2022-12-25 DIAGNOSIS — R10.12 LEFT UPPER QUADRANT ABDOMINAL PAIN: ICD-10-CM

## 2022-12-25 DIAGNOSIS — Z98.84 HISTORY OF ROUX-EN-Y GASTRIC BYPASS: ICD-10-CM

## 2022-12-25 DIAGNOSIS — K21.9 GASTROESOPHAGEAL REFLUX DISEASE, UNSPECIFIED WHETHER ESOPHAGITIS PRESENT: ICD-10-CM

## 2023-01-02 ENCOUNTER — PATIENT MESSAGE (OUTPATIENT)
Dept: FAMILY MEDICINE CLINIC | Facility: CLINIC | Age: 48
End: 2023-01-02

## 2023-01-02 DIAGNOSIS — Z01.84 IMMUNITY STATUS TESTING: Primary | ICD-10-CM

## 2023-01-03 ENCOUNTER — PATIENT MESSAGE (OUTPATIENT)
Dept: FAMILY MEDICINE CLINIC | Facility: CLINIC | Age: 48
End: 2023-01-03

## 2023-01-03 DIAGNOSIS — Z01.84 IMMUNITY STATUS TESTING: Primary | ICD-10-CM

## 2023-01-04 ENCOUNTER — APPOINTMENT (OUTPATIENT)
Dept: LAB | Facility: HOSPITAL | Age: 48
End: 2023-01-04

## 2023-01-04 DIAGNOSIS — Z01.84 IMMUNITY STATUS TESTING: ICD-10-CM

## 2023-01-04 DIAGNOSIS — Z23 NEED FOR HEPATITIS B BOOSTER VACCINATION: ICD-10-CM

## 2023-01-05 DIAGNOSIS — Z01.84 IMMUNITY STATUS TESTING: Primary | ICD-10-CM

## 2023-01-05 LAB
HBV SURFACE AB SER-ACNC: 36.88 MIU/ML
MUV IGG SER QL IA: ABNORMAL

## 2023-01-16 DIAGNOSIS — L30.9 ECZEMA, UNSPECIFIED TYPE: Primary | ICD-10-CM

## 2023-02-07 ENCOUNTER — APPOINTMENT (OUTPATIENT)
Dept: LAB | Facility: CLINIC | Age: 48
End: 2023-02-07

## 2023-02-07 DIAGNOSIS — Z01.84 IMMUNITY STATUS TESTING: ICD-10-CM

## 2023-02-08 LAB — MUV IGG SER QL IA: ABNORMAL

## 2023-02-09 ENCOUNTER — CLINICAL SUPPORT (OUTPATIENT)
Dept: FAMILY MEDICINE CLINIC | Facility: CLINIC | Age: 48
End: 2023-02-09

## 2023-02-09 DIAGNOSIS — Z23 ENCOUNTER FOR IMMUNIZATION: Primary | ICD-10-CM

## 2023-04-23 DIAGNOSIS — K21.9 GASTROESOPHAGEAL REFLUX DISEASE, UNSPECIFIED WHETHER ESOPHAGITIS PRESENT: ICD-10-CM

## 2023-04-23 DIAGNOSIS — Z98.84 HISTORY OF ROUX-EN-Y GASTRIC BYPASS: ICD-10-CM

## 2023-04-23 DIAGNOSIS — R10.12 LEFT UPPER QUADRANT ABDOMINAL PAIN: ICD-10-CM

## 2023-06-08 ENCOUNTER — OFFICE VISIT (OUTPATIENT)
Dept: OBGYN CLINIC | Facility: MEDICAL CENTER | Age: 48
End: 2023-06-08
Payer: COMMERCIAL

## 2023-06-08 VITALS
SYSTOLIC BLOOD PRESSURE: 124 MMHG | DIASTOLIC BLOOD PRESSURE: 70 MMHG | HEIGHT: 67 IN | WEIGHT: 254.7 LBS | BODY MASS INDEX: 39.98 KG/M2

## 2023-06-08 DIAGNOSIS — N89.8 VAGINAL DISCHARGE: Primary | ICD-10-CM

## 2023-06-08 DIAGNOSIS — N76.0 BV (BACTERIAL VAGINOSIS): ICD-10-CM

## 2023-06-08 DIAGNOSIS — B96.89 BV (BACTERIAL VAGINOSIS): ICD-10-CM

## 2023-06-08 DIAGNOSIS — R10.2 PELVIC PRESSURE IN FEMALE: ICD-10-CM

## 2023-06-08 LAB
BV WHIFF TEST VAG QL: POSITIVE
CLUE CELLS SPEC QL WET PREP: POSITIVE
PH SMN: ABNORMAL [PH]
SL AMB POCT WET MOUNT: ABNORMAL
T VAGINALIS VAG QL WET PREP: NEGATIVE
YEAST VAG QL WET PREP: NEGATIVE

## 2023-06-08 PROCEDURE — 99213 OFFICE O/P EST LOW 20 MIN: CPT | Performed by: ADVANCED PRACTICE MIDWIFE

## 2023-06-08 PROCEDURE — 87210 SMEAR WET MOUNT SALINE/INK: CPT | Performed by: ADVANCED PRACTICE MIDWIFE

## 2023-06-08 RX ORDER — METRONIDAZOLE 500 MG/1
500 TABLET ORAL EVERY 12 HOURS SCHEDULED
Qty: 14 TABLET | Refills: 0 | Status: SHIPPED | OUTPATIENT
Start: 2023-06-08 | End: 2023-06-15

## 2023-06-08 NOTE — PROGRESS NOTES
A/P:  52 y o  yo female with:  Diagnoses and all orders for this visit:    Vaginal discharge  -     metroNIDAZOLE (FLAGYL) 500 mg tablet; Take 1 tablet (500 mg total) by mouth every 12 (twelve) hours for 7 days    BV (bacterial vaginosis)  -     metroNIDAZOLE (FLAGYL) 500 mg tablet; Take 1 tablet (500 mg total) by mouth every 12 (twelve) hours for 7 days  -     POCT wet mount    Pelvic pressure in female    - if pain continues and/or worsens to call office and will schedule pelvic ultrasound    1  Wet prep was obtained  Cultures for gonorrhea and chlamydia were not collected  Affirm was not obtained  2   Will contact pt with results  3  Patient was treated for BV  4  To call if symptoms kelly not improve or worsen  5  If develops rebound yeast- will call for treatment  CHIEF COMPLAINT: grey discharge and pelvic discomfort    HISTORY OF PRESENT ILLNESS:  Zia Gonzalez is a 52 y o  yo  female who presents for vaginal discharge  She describes the discharge as mucousy, non-fishy odor and greyish color  Her symptoms started 3 weeks ago  Symptoms  show no change  Notes that she is having dull pelvic pain for 3 weeks and then started with discharge  No change in voiding pattern  States that she has average amount of fluids, 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  Pain is midline and also on right side  No change in bowel pattern  No sexual activity  No hx of ovarian cyst  Menses has been regular  Alleviating factors: none  Aggravating factors: none    ROS:   She denies hematuria, dysuria, constipation, diarrhea, fever, chills, nausea or emesis      Past Medical History:   Diagnosis Date   • Anemia    • Chronic back pain     secondary to spinal stenosis   • Environmental allergies    • GERD (gastroesophageal reflux disease)    • Hypertension    • Migraine    • Spinal stenosis        Past Medical History:   Diagnosis Date   • Anemia    • Chronic back pain     secondary to spinal stenosis "  • Environmental allergies    • GERD (gastroesophageal reflux disease)    • Hypertension    • Migraine    • Spinal stenosis        Social History     Socioeconomic History   • Marital status: Single     Spouse name: Not on file   • Number of children: Not on file   • Years of education: Not on file   • Highest education level: Not on file   Occupational History   • Not on file   Tobacco Use   • Smoking status: Never   • Smokeless tobacco: Never   Vaping Use   • Vaping Use: Never used   Substance and Sexual Activity   • Alcohol use: No   • Drug use: No   • Sexual activity: Not Currently     Birth control/protection: None   Other Topics Concern   • Not on file   Social History Narrative   • Not on file     Social Determinants of Health     Financial Resource Strain: Not on file   Food Insecurity: Not on file   Transportation Needs: Not on file   Physical Activity: Not on file   Stress: Not on file   Social Connections: Not on file   Intimate Partner Violence: Not on file   Housing Stability: Not on file       /70   Ht 5' 7\" (1 702 m)   Wt 116 kg (254 lb 11 2 oz)   LMP 05/19/2023 (Exact Date)   BMI 39 89 kg/m²     GEN: The patient was alert and oriented x3, pleasant well-appearing female in no acute distress  Pelvic: Normal appearing external female genitalia, normal vaginal epithelium, normalappearing cervix  positive discharge noted        Wet Prep: positive clue cells and positive whiff test      "

## 2023-07-06 DIAGNOSIS — N76.0 BV (BACTERIAL VAGINOSIS): ICD-10-CM

## 2023-07-06 DIAGNOSIS — R10.2 PELVIC PAIN: Primary | ICD-10-CM

## 2023-07-06 DIAGNOSIS — B96.89 BV (BACTERIAL VAGINOSIS): ICD-10-CM

## 2023-07-06 RX ORDER — METRONIDAZOLE 500 MG/1
500 TABLET ORAL EVERY 12 HOURS SCHEDULED
Qty: 14 TABLET | Refills: 0 | Status: SHIPPED | OUTPATIENT
Start: 2023-07-06 | End: 2023-07-13

## 2023-07-20 ENCOUNTER — APPOINTMENT (EMERGENCY)
Dept: RADIOLOGY | Facility: HOSPITAL | Age: 48
End: 2023-07-20
Payer: OTHER MISCELLANEOUS

## 2023-07-20 ENCOUNTER — HOSPITAL ENCOUNTER (EMERGENCY)
Facility: HOSPITAL | Age: 48
Discharge: HOME | End: 2023-07-20
Attending: EMERGENCY MEDICINE
Payer: OTHER MISCELLANEOUS

## 2023-07-20 VITALS
OXYGEN SATURATION: 98 % | HEART RATE: 110 BPM | SYSTOLIC BLOOD PRESSURE: 160 MMHG | RESPIRATION RATE: 18 BRPM | DIASTOLIC BLOOD PRESSURE: 86 MMHG | TEMPERATURE: 98.2 F
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
## 2023-07-20 DIAGNOSIS — T14.8XXA PUNCTURE WOUND: ICD-10-CM

## 2023-07-20 PROCEDURE — 90375 RABIES IG IM/SC: CPT

## 2023-07-20 PROCEDURE — 96372 THER/PROPH/DIAG INJ SC/IM: CPT

## 2023-07-20 PROCEDURE — 90675 RABIES VACCINE IM: CPT

## 2023-07-20 PROCEDURE — 73552 X-RAY EXAM OF FEMUR 2/>: CPT

## 2023-07-20 PROCEDURE — 99283 EMERGENCY DEPT VISIT LOW MDM: CPT

## 2023-07-20 PROCEDURE — 90471 IMMUNIZATION ADMIN: CPT

## 2023-07-20 PROCEDURE — 99284 EMERGENCY DEPT VISIT MOD MDM: CPT

## 2023-07-20 RX ORDER — AMOXICILLIN AND CLAVULANATE POTASSIUM 875; 125 MG/1; MG/1
1 TABLET, FILM COATED ORAL ONCE
Status: COMPLETED | OUTPATIENT
Start: 2023-07-20 | End: 2023-07-20

## 2023-07-20 RX ORDER — AMOXICILLIN AND CLAVULANATE POTASSIUM 875; 125 MG/1; MG/1
1 TABLET, FILM COATED ORAL EVERY 12 HOURS
Qty: 14 TABLET | Refills: 0 | Status: SHIPPED | OUTPATIENT
Start: 2023-07-20 | End: 2023-07-27 | Stop reason: SDUPTHER

## 2023-07-20 RX ORDER — LIDOCAINE HYDROCHLORIDE 20 MG/ML
JELLY TOPICAL ONCE
Status: COMPLETED | OUTPATIENT
Start: 2023-07-20 | End: 2023-07-20

## 2023-07-20 RX ORDER — SIMETHICONE 80 MG
80 TABLET,CHEWABLE ORAL ONCE
Status: COMPLETED | OUTPATIENT
Start: 2023-07-20 | End: 2023-07-20

## 2023-07-20 RX ORDER — OXYCODONE HYDROCHLORIDE 5 MG/1
5 TABLET ORAL ONCE
Status: COMPLETED | OUTPATIENT
Start: 2023-07-20 | End: 2023-07-20

## 2023-07-20 RX ADMIN — AMOXICILLIN AND CLAVULANATE POTASSIUM 1 TABLET: 875; 125 TABLET, FILM COATED ORAL at 16:21

## 2023-07-20 RX ADMIN — OXYCODONE HYDROCHLORIDE 5 MG: 5 TABLET ORAL at 16:30

## 2023-07-20 RX ADMIN — LIDOCAINE HYDROCHLORIDE 5 APPLICATION: 20 JELLY TOPICAL at 16:47

## 2023-07-20 RX ADMIN — RABIES IMMUNE GLOBULIN (HUMAN) 2310 UNITS: 300 INJECTION, SOLUTION INFILTRATION; INTRAMUSCULAR at 16:45

## 2023-07-20 RX ADMIN — RABIES VIRUS STRAIN PM-1503-3M ANTIGEN (PROPIOLACTONE INACTIVATED) AND WATER 1 ML: KIT at 16:21

## 2023-07-20 RX ADMIN — SIMETHICONE 80 MG: 80 TABLET, CHEWABLE ORAL at 16:30

## 2023-07-20 NOTE — Clinical Note
Yao Mclaughlin was seen and treated in our emergency department on 7/20/2023. No restrictions    No work until cleared by Family Doctor/Orthopedics        Diagnosis:     Trevor Qureshi  is off the rest of the shift today, may return to work on return date. She may return on this date: 07/27/2023         If you have any questions or concerns, please don't hesitate to call.       Radha Tinajero PA-C    ______________________________           _______________          _______________  Hospital Representative                              Date                                Time

## 2023-07-20 NOTE — DISCHARGE INSTRUCTIONS
Please follow up on days 3, 7, and 14 for repeat vaccination either at the ER, urgent care, of your family physician (please call ahead if you choose your family physician so they can order the vaccine, if necessary).

## 2023-07-21 NOTE — ED PROVIDER NOTES
History  Chief Complaint   Patient presents with   • Dog Bite     Patient was bite by large mixed bread dog while at work. Large hematoma noted on inside of left thigh along with 7 puncture wounds. 3 small puncture wounds noted on right wrist.      77-year-old female presenting today after being attacked by a large dog while she was at work. Patient states that she was bitten on the left medial thigh as well as the right wrist.  States that she has multiple puncture wounds. Denies any paresthesias. Patient is in areas very bruised and swollen and painful. Denies any fever, chills, syncope or any other injuries. Denies any head strikes. No blood thinners. Denies any nausea, vomiting, chest pain, shortness of breath. Patient is up-to-date on tetanus however she is unsure if the dog was vaccinated for rabies and she would like the shots. Prior to Admission Medications   Prescriptions Last Dose Informant Patient Reported? Taking?    DULoxetine (CYMBALTA) 60 mg delayed release capsule   No No   Sig: Take 1 capsule (60 mg total) by mouth daily   Patient taking differently: Take 120 mg by mouth daily   Insulin Syringe-Needle U-100 30G 1 ML MISC   No No   Sig: Use every 30 (thirty) days for 1 dose For use with IM B12   albuterol (ProAir HFA) 90 mcg/act inhaler   No No   Sig: Inhale 2 puffs every 6 (six) hours as needed for wheezing or shortness of breath   ascorbic acid (VITAMIN C) 250 mg tablet  Self No No   Sig: Take 1 tablet (250 mg total) by mouth daily With FeSO4   azelastine (ASTELIN) 0.1 % nasal spray   No No   Si sprays into each nostril 2 (two) times a day Use in each nostril as directed   cetirizine (ZyrTEC) 10 mg tablet  Self Yes No   clobetasol (TEMOVATE) 0.05 % cream   No No   Sig: Apply topically 2 (two) times a day   Patient not taking: Reported on 2022   cyanocobalamin 1,000 mcg/mL   No No   Sig: Inject 1 mL (1,000 mcg total) into a muscle every 30 (thirty) days   dicyclomine (BENTYL) 10 mg capsule   No No   Sig: Take 1 capsule (10 mg total) by mouth 4 (four) times a day as needed (abdominal pain)   ergocalciferol (VITAMIN D2) 50,000 units   No No   Sig: Take 1 capsule (50,000 Units total) by mouth every 14 (fourteen) days for 12 doses   Patient taking differently: Take 1,000 Units by mouth in the morning   esomeprazole (NexIUM) 20 mg capsule   No No   Sig: TAKE ONE CAPSULE BY MOUTH EVERY DAY IN THE EARLY MORNING   famotidine (PEPCID) 40 MG tablet   No No   Sig: Take 1 tablet (40 mg total) by mouth daily at bedtime as needed for heartburn   ferrous sulfate 324 (65 Fe) mg   No No   Sig: Take 2-3 tabs every other day with orange juice or vitamin C   fluticasone (FLONASE) 50 mcg/act nasal spray   No No   Si sprays into each nostril daily   Patient not taking: Reported on 4251   folic acid (FOLVITE) 480 mcg tablet   No No   Sig: Take 1 tablet (400 mcg total) by mouth daily   metoprolol succinate (TOPROL-XL) 50 mg 24 hr tablet   No No   Sig: Take 1 tablet (50 mg total) by mouth daily   montelukast (SINGULAIR) 10 mg tablet   No No   Sig: Take 1 tablet (10 mg total) by mouth daily at bedtime   Patient not taking: Reported on 2023   mupirocin (BACTROBAN) 2 % ointment   No No   Sig: Apply topically 3 (three) times a day   ondansetron (ZOFRAN) 8 mg tablet   No No   Sig: Take 1 tablet (8 mg total) by mouth every 8 (eight) hours as needed for nausea or vomiting      Facility-Administered Medications: None       Past Medical History:   Diagnosis Date   • Anemia    • Chronic back pain     secondary to spinal stenosis   • Environmental allergies    • GERD (gastroesophageal reflux disease)    • Hypertension    • Migraine    • Spinal stenosis        Past Surgical History:   Procedure Laterality Date   • BARIATRIC SURGERY     • CHOLECYSTECTOMY     • GASTRIC BYPASS         Family History   Problem Relation Age of Onset   • Diabetes Mother    • Hypertension Mother    • Endometrial cancer Mother    • Cancer Mother         Endometrial   • Diabetes Father    • Hypertension Father    • Heart disease Father    • Prostate cancer Father    • Cancer Father         Prostate   • Hypertension Sister    • No Known Problems Sister    • Hypertension Maternal Grandmother    • Heart disease Maternal Grandmother    • Diabetes Maternal Grandmother    • No Known Problems Maternal Grandfather    • Breast cancer Paternal Grandmother    • Cancer Paternal Grandmother         Laryngeal and breast   • Prostate cancer Paternal Grandfather    • Bone cancer Paternal Grandfather    • Cancer Paternal Grandfather         Prostate     I have reviewed and agree with the history as documented. E-Cigarette/Vaping   • E-Cigarette Use Never User      E-Cigarette/Vaping Substances   • Nicotine No    • THC No    • CBD No    • Flavoring No    • Other No    • Unknown No      Social History     Tobacco Use   • Smoking status: Never   • Smokeless tobacco: Never   Vaping Use   • Vaping Use: Never used   Substance Use Topics   • Alcohol use: No   • Drug use: No       Review of Systems   Constitutional: Negative for chills and fever. HENT: Negative for ear pain and sore throat. Eyes: Negative for pain and visual disturbance. Respiratory: Negative for cough and shortness of breath. Cardiovascular: Negative for chest pain and palpitations. Gastrointestinal: Negative for abdominal pain, constipation, diarrhea, nausea and vomiting. Genitourinary: Negative for dysuria, flank pain and hematuria. Musculoskeletal: Negative for arthralgias and back pain. Skin: Positive for color change and wound. Negative for pallor and rash. Neurological: Negative for dizziness, seizures, syncope, weakness, light-headedness and headaches. All other systems reviewed and are negative. Physical Exam  Physical Exam  Vitals and nursing note reviewed. Constitutional:       General: She is not in acute distress.      Appearance: She is well-developed. She is ill-appearing. HENT:      Head: Normocephalic and atraumatic. Eyes:      Conjunctiva/sclera: Conjunctivae normal.   Cardiovascular:      Rate and Rhythm: Normal rate and regular rhythm. Pulses: Normal pulses. Heart sounds: Normal heart sounds. No murmur heard. No friction rub. No gallop. Pulmonary:      Effort: Pulmonary effort is normal. No respiratory distress. Breath sounds: Normal breath sounds. No stridor. No wheezing, rhonchi or rales. Chest:      Chest wall: No tenderness. Abdominal:      Palpations: Abdomen is soft. Tenderness: There is no abdominal tenderness. There is no right CVA tenderness or left CVA tenderness. Musculoskeletal:         General: Swelling, tenderness (5 small puncture wounds to the left medial upper thigh. Small abrasion to the right wrist.  3 small abrasions to the left medial thigh as well. Entire area is bruised and swollen.) and signs of injury present. No deformity. Cervical back: Neck supple. Right lower leg: No edema. Left lower leg: No edema. Skin:     General: Skin is warm and dry. Capillary Refill: Capillary refill takes less than 2 seconds. Coloration: Skin is not jaundiced or pale. Findings: Bruising present. No erythema, lesion or rash. Neurological:      Mental Status: She is alert.    Psychiatric:         Mood and Affect: Mood normal.         Vital Signs  ED Triage Vitals   Temperature Pulse Respirations Blood Pressure SpO2   07/20/23 1505 07/20/23 1505 07/20/23 1505 07/20/23 1505 07/20/23 1505   98.2 °F (36.8 °C) (!) 110 18 160/86 98 %      Temp src Heart Rate Source Patient Position - Orthostatic VS BP Location FiO2 (%)   -- 07/20/23 1505 07/20/23 1505 07/20/23 1505 --    Monitor Sitting Right arm       Pain Score       07/20/23 1630       5           Vitals:    07/20/23 1505   BP: 160/86   Pulse: (!) 110   Patient Position - Orthostatic VS: Sitting         Visual Acuity      ED Medications  Medications   rabies immune globulin, human (HyperRAB) injection 2,310 Units (2,310 Units Infiltration Given by Other 7/20/23 1645)   rabies vaccine, human diploid IM injection 1 mL (1 mL Intramuscular Given 7/20/23 1621)   amoxicillin-clavulanate (AUGMENTIN) 875-125 mg per tablet 1 tablet (1 tablet Oral Given 7/20/23 1621)   oxyCODONE (ROXICODONE) IR tablet 5 mg (5 mg Oral Given 7/20/23 1630)   simethicone (MYLICON) chewable tablet 80 mg (80 mg Oral Given 7/20/23 1630)   lidocaine (URO-JET) 2 % urethral/mucosal gel (5 Applications Topical Given 7/20/23 1647)       Diagnostic Studies  Results Reviewed     None                 XR femur 2 views LEFT   ED Interpretation by Terra Goyal PA-C (07/20 1622)   No acute fracture or dislocation      Final Result by Terry Ga MD (07/21 1421)      No acute osseous abnormality. This report is in agreement with the preliminary interpretation. Workstation performed: [id number]                    Procedures  Procedures         ED Course                                             Medical Decision Making  49-year-old female presenting today after being attacked by dog and bitten on the left medial upper thigh as well as the right wrist.  We will x-ray to rule out fracture as the swelling is severe in the left medial thigh. We will update rabies vaccination as well as IgG. Will give oxycodone for pain. Will start on Augmentin for inherent infection from dog bite. We will give patient return precautions as well as return date for rabies vaccination. Prescription of Augmentin sent over to patient's pharmacy. Work note provided. Patient is on discharge well-appearing no acute distress, all questions answered. Was agreeable to plan. Patient's vitals, lab/imaging results, diagnosis, and treatment plan were discussed with the patient.  All new/changed medications were discussed with patient, specifically, route of administration, how often and when to take, and where they can be picked up. Strict return precautions as well as close follow up with PCP was discussed with the patient and the patient was agreeable to my recommendations. Patient verbally acknowledged understanding of the above communications. All labs reviewed and utilized in the medical decision making process (if labs were ordered). Portions of the record may have been created with voice recognition software.  Occasional wrong word or "sound a like" substitutions may have occurred due to the inherent limitations of voice recognition software.  Read the chart carefully and recognize, using context, where substitutions have occurred. Amount and/or Complexity of Data Reviewed  Radiology: ordered and independent interpretation performed. Risk  OTC drugs. Prescription drug management. Disposition  Final diagnoses:   Dog bite, initial encounter   Puncture wound     Time reflects when diagnosis was documented in both MDM as applicable and the Disposition within this note     Time User Action Codes Description Comment    7/20/2023  4:51 PM Yaniv Tillman. 0XXA] Dog bite, initial encounter     7/20/2023  4:51 PM Evans Hauser. 8XXA] Puncture wound       ED Disposition     ED Disposition   Discharge    Condition   Stable    Date/Time   Thu Jul 20, 2023  4:51 PM    Comment   Marjan Hollingsworth discharge to home/self care.                Follow-up Information     Follow up With Specialties Details Why Contact Info Additional [address] [facility] Emergency Medicine Go to  If symptoms worsen [address] [facility], [place], [place], [address]          Discharge Medication List as of 7/20/2023  4:53 PM      START taking these medications    Details   amoxicillin-clavulanate (AUGMENTIN) 875-125 mg per tablet Take 1 tablet by mouth every 12 (twelve) hours for 7 days, Starting Thu 7/20/2023, Until Thu 7/27/2023, Normal         CONTINUE these medications which have NOT CHANGED    Details   albuterol (ProAir HFA) 90 mcg/act inhaler Inhale 2 puffs every 6 (six) hours as needed for wheezing or shortness of breath, Starting Wed 8/17/2022, Normal      ascorbic acid (VITAMIN C) 250 mg tablet Take 1 tablet (250 mg total) by mouth daily With FeSO4, Starting Sat 6/6/2020, No Print      azelastine (ASTELIN) 0.1 % nasal spray 2 sprays into each nostril 2 (two) times a day Use in each nostril as directed, Starting Tue 4/5/2022, Normal      cetirizine (ZyrTEC) 10 mg tablet Historical Med      clobetasol (TEMOVATE) 0.05 % cream Apply topically 2 (two) times a day, Starting Wed 6/22/2022, Normal      cyanocobalamin 1,000 mcg/mL Inject 1 mL (1,000 mcg total) into a muscle every 30 (thirty) days, Starting Tue 11/22/2022, Normal      dicyclomine (BENTYL) 10 mg capsule Take 1 capsule (10 mg total) by mouth 4 (four) times a day as needed (abdominal pain), Starting Thu 4/21/2022, Normal      DULoxetine (CYMBALTA) 60 mg delayed release capsule Take 1 capsule (60 mg total) by mouth daily, Starting Tue 11/22/2022, No Print      ergocalciferol (VITAMIN D2) 50,000 units Take 1 capsule (50,000 Units total) by mouth every 14 (fourteen) days for 12 doses, Starting Tue 11/22/2022, Until u 6/8/2023, Normal      esomeprazole (NexIUM) 20 mg capsule TAKE ONE CAPSULE BY MOUTH EVERY DAY IN THE EARLY MORNING, Normal      famotidine (PEPCID) 40 MG tablet Take 1 tablet (40 mg total) by mouth daily at bedtime as needed for heartburn, Starting Wed 9/15/2021, Normal      ferrous sulfate 324 (65 Fe) mg Take 2-3 tabs every other day with orange juice or vitamin C, No Print      fluticasone (FLONASE) 50 mcg/act nasal spray 2 sprays into each nostril daily, Starting Sun 3/68/6475, Normal      folic acid (FOLVITE) 967 mcg tablet Take 1 tablet (400 mcg total) by mouth daily, Starting Mon 1/17/2022, Normal      Insulin Syringe-Needle U-100 30G 1 ML MISC Use every 30 (thirty) days for 1 dose For use with IM B12, Starting Tue 11/22/2022, Until Thu 6/8/2023, Normal      metoprolol succinate (TOPROL-XL) 50 mg 24 hr tablet Take 1 tablet (50 mg total) by mouth daily, Starting Mon 10/17/2022, Normal      montelukast (SINGULAIR) 10 mg tablet Take 1 tablet (10 mg total) by mouth daily at bedtime, Starting Mon 11/21/2022, Normal      mupirocin (BACTROBAN) 2 % ointment Apply topically 3 (three) times a day, Starting Mon 1/16/2023, Normal      ondansetron (ZOFRAN) 8 mg tablet Take 1 tablet (8 mg total) by mouth every 8 (eight) hours as needed for nausea or vomiting, Starting Mon 11/21/2022, Normal             No discharge procedures on file.     PDMP Review       Value Time User    PDMP Reviewed  Yes 9/13/2022 11:05 AM Jerry Severino MD          ED Provider  Electronically Signed by           Denise Barnes PA-C  07/21/23 3418

## 2023-07-23 ENCOUNTER — OCCMED (OUTPATIENT)
Dept: URGENT CARE | Facility: CLINIC | Age: 48
End: 2023-07-23
Payer: OTHER MISCELLANEOUS
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
## 2023-07-23 DIAGNOSIS — Z23 ENCOUNTER FOR REPEAT ADMINISTRATION OF RABIES VACCINATION: ICD-10-CM

## 2023-07-23 PROCEDURE — 90471 IMMUNIZATION ADMIN: CPT

## 2023-07-23 PROCEDURE — 99213 OFFICE O/P EST LOW 20 MIN: CPT

## 2023-07-23 PROCEDURE — 90675 RABIES VACCINE IM: CPT

## 2023-07-27 ENCOUNTER — OCCMED (OUTPATIENT)
Dept: URGENT CARE | Facility: CLINIC | Age: 48
End: 2023-07-27
Payer: OTHER MISCELLANEOUS

## 2023-07-27 ENCOUNTER — APPOINTMENT (OUTPATIENT)
Dept: LAB | Facility: HOSPITAL | Age: 48
End: 2023-07-27
Payer: COMMERCIAL

## 2023-07-27 ENCOUNTER — OFFICE VISIT (OUTPATIENT)
Dept: FAMILY MEDICINE CLINIC | Facility: CLINIC | Age: 48
End: 2023-07-27
Payer: COMMERCIAL

## 2023-07-27 ENCOUNTER — APPOINTMENT (OUTPATIENT)
Dept: LAB | Facility: CLINIC | Age: 48
End: 2023-07-27
Payer: COMMERCIAL

## 2023-07-27 VITALS
DIASTOLIC BLOOD PRESSURE: 76 MMHG | HEIGHT: 67 IN | HEART RATE: 88 BPM | WEIGHT: 257.2 LBS | TEMPERATURE: 98.1 F | BODY MASS INDEX: 40.37 KG/M2 | RESPIRATION RATE: 16 BRPM | SYSTOLIC BLOOD PRESSURE: 122 MMHG | OXYGEN SATURATION: 98 %
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
## 2023-07-27 DIAGNOSIS — R73.03 PREDIABETES: ICD-10-CM

## 2023-07-27 DIAGNOSIS — I10 ESSENTIAL HYPERTENSION: ICD-10-CM

## 2023-07-27 DIAGNOSIS — Z12.11 SCREENING FOR COLON CANCER: ICD-10-CM

## 2023-07-27 DIAGNOSIS — S71.132D: ICD-10-CM

## 2023-07-27 DIAGNOSIS — D50.9 IRON DEFICIENCY ANEMIA, UNSPECIFIED IRON DEFICIENCY ANEMIA TYPE: ICD-10-CM

## 2023-07-27 DIAGNOSIS — S71.132D: Primary | ICD-10-CM

## 2023-07-27 DIAGNOSIS — E66.01 CLASS 3 SEVERE OBESITY DUE TO EXCESS CALORIES WITH SERIOUS COMORBIDITY AND BODY MASS INDEX (BMI) OF 40.0 TO 44.9 IN ADULT (HCC): ICD-10-CM

## 2023-07-27 DIAGNOSIS — B96.89 BV (BACTERIAL VAGINOSIS): ICD-10-CM

## 2023-07-27 DIAGNOSIS — N76.0 BV (BACTERIAL VAGINOSIS): ICD-10-CM

## 2023-07-27 DIAGNOSIS — Z23 ENCOUNTER FOR REPEAT ADMINISTRATION OF RABIES VACCINATION: ICD-10-CM

## 2023-07-27 DIAGNOSIS — E55.9 VITAMIN D DEFICIENCY: ICD-10-CM

## 2023-07-27 DIAGNOSIS — Z12.31 SCREENING MAMMOGRAM FOR BREAST CANCER: ICD-10-CM

## 2023-07-27 DIAGNOSIS — S70.12XD HEMATOMA OF LEFT THIGH, SUBSEQUENT ENCOUNTER: ICD-10-CM

## 2023-07-27 DIAGNOSIS — E53.8 B12 DEFICIENCY: ICD-10-CM

## 2023-07-27 DIAGNOSIS — S70.12XA HEMATOMA OF LEFT THIGH, INITIAL ENCOUNTER: ICD-10-CM
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
## 2023-07-27 PROBLEM — M79.89 LEG SWELLING: Status: RESOLVED | Noted: 2020-06-01 | Resolved: 2023-07-27

## 2023-07-27 LAB
25(OH)D3 SERPL-MCNC: 18.9 NG/ML (ref 30–100)
ALBUMIN SERPL BCP-MCNC: 3.5 G/DL (ref 3.5–5)
ALP SERPL-CCNC: 85 U/L (ref 46–116)
ALT SERPL W P-5'-P-CCNC: 23 U/L (ref 12–78)
ANION GAP SERPL CALCULATED.3IONS-SCNC: 6 MMOL/L
AST SERPL W P-5'-P-CCNC: 16 U/L (ref 5–45)
BASOPHILS # BLD AUTO: 0.05 THOUSANDS/ÂΜL (ref 0–0.1)
BASOPHILS NFR BLD AUTO: 1 % (ref 0–1)
BILIRUB SERPL-MCNC: 0.55 MG/DL (ref 0.2–1)
BUN SERPL-MCNC: 9 MG/DL (ref 5–25)
CALCIUM SERPL-MCNC: 9 MG/DL (ref 8.3–10.1)
CHLORIDE SERPL-SCNC: 107 MMOL/L (ref 96–108)
CO2 SERPL-SCNC: 28 MMOL/L (ref 21–32)
CREAT SERPL-MCNC: 0.64 MG/DL (ref 0.6–1.3)
EOSINOPHIL # BLD AUTO: 0.27 THOUSAND/ÂΜL (ref 0–0.61)
EOSINOPHIL NFR BLD AUTO: 3 % (ref 0–6)
ERYTHROCYTE [DISTWIDTH] IN BLOOD BY AUTOMATED COUNT: 14.4 % (ref 11.6–15.1)
EST. AVERAGE GLUCOSE BLD GHB EST-MCNC: 128 MG/DL
FERRITIN SERPL-MCNC: 18 NG/ML (ref 11–307)
GFR SERPL CREATININE-BSD FRML MDRD: 106 ML/MIN/1.73SQ M
GLUCOSE P FAST SERPL-MCNC: 117 MG/DL (ref 65–99)
HBA1C MFR BLD: 6.1 %
HCT VFR BLD AUTO: 39.7 % (ref 34.8–46.1)
HGB BLD-MCNC: 12.7 G/DL (ref 11.5–15.4)
IMM GRANULOCYTES # BLD AUTO: 0.02 THOUSAND/UL (ref 0–0.2)
IMM GRANULOCYTES NFR BLD AUTO: 0 % (ref 0–2)
IRON SATN MFR SERPL: 42 % (ref 15–50)
IRON SERPL-MCNC: 164 UG/DL (ref 50–170)
LYMPHOCYTES # BLD AUTO: 2.62 THOUSANDS/ÂΜL (ref 0.6–4.47)
LYMPHOCYTES NFR BLD AUTO: 31 % (ref 14–44)
MCH RBC QN AUTO: 26.6 PG (ref 26.8–34.3)
MCHC RBC AUTO-ENTMCNC: 32 G/DL (ref 31.4–37.4)
MCV RBC AUTO: 83 FL (ref 82–98)
MONOCYTES # BLD AUTO: 0.51 THOUSAND/ÂΜL (ref 0.17–1.22)
MONOCYTES NFR BLD AUTO: 6 % (ref 4–12)
NEUTROPHILS # BLD AUTO: 4.88 THOUSANDS/ÂΜL (ref 1.85–7.62)
NEUTS SEG NFR BLD AUTO: 59 % (ref 43–75)
NRBC BLD AUTO-RTO: 0 /100 WBCS
PLATELET # BLD AUTO: 333 THOUSANDS/UL (ref 149–390)
PMV BLD AUTO: 10.3 FL (ref 8.9–12.7)
POTASSIUM SERPL-SCNC: 3.8 MMOL/L (ref 3.5–5.3)
PROT SERPL-MCNC: 6.5 G/DL (ref 6.4–8.4)
RBC # BLD AUTO: 4.78 MILLION/UL (ref 3.81–5.12)
SODIUM SERPL-SCNC: 141 MMOL/L (ref 135–147)
TIBC SERPL-MCNC: 393 UG/DL (ref 250–450)
VIT B12 SERPL-MCNC: 118 PG/ML (ref 180–914)
WBC # BLD AUTO: 8.35 THOUSAND/UL (ref 4.31–10.16)

## 2023-07-27 PROCEDURE — 87660 TRICHOMONAS VAGIN DIR PROBE: CPT | Performed by: INTERNAL MEDICINE

## 2023-07-27 PROCEDURE — 90675 RABIES VACCINE IM: CPT

## 2023-07-27 PROCEDURE — 36415 COLL VENOUS BLD VENIPUNCTURE: CPT

## 2023-07-27 PROCEDURE — 85025 COMPLETE CBC W/AUTO DIFF WBC: CPT

## 2023-07-27 PROCEDURE — 82607 VITAMIN B-12: CPT

## 2023-07-27 PROCEDURE — 99213 OFFICE O/P EST LOW 20 MIN: CPT

## 2023-07-27 PROCEDURE — 83540 ASSAY OF IRON: CPT

## 2023-07-27 PROCEDURE — 83036 HEMOGLOBIN GLYCOSYLATED A1C: CPT

## 2023-07-27 PROCEDURE — 82306 VITAMIN D 25 HYDROXY: CPT

## 2023-07-27 PROCEDURE — 87510 GARDNER VAG DNA DIR PROBE: CPT | Performed by: INTERNAL MEDICINE

## 2023-07-27 PROCEDURE — 80053 COMPREHEN METABOLIC PANEL: CPT

## 2023-07-27 PROCEDURE — 82728 ASSAY OF FERRITIN: CPT

## 2023-07-27 PROCEDURE — 87480 CANDIDA DNA DIR PROBE: CPT | Performed by: INTERNAL MEDICINE

## 2023-07-27 PROCEDURE — 83550 IRON BINDING TEST: CPT

## 2023-07-27 PROCEDURE — 99214 OFFICE O/P EST MOD 30 MIN: CPT | Performed by: INTERNAL MEDICINE

## 2023-07-27 RX ORDER — AMOXICILLIN AND CLAVULANATE POTASSIUM 875; 125 MG/1; MG/1
1 TABLET, FILM COATED ORAL EVERY 12 HOURS
Qty: 14 TABLET | Refills: 0 | Status: SHIPPED | OUTPATIENT
Start: 2023-07-27 | End: 2023-08-03

## 2023-07-27 NOTE — PROGRESS NOTES
[facility]        NAME: Giovanni Miles is a 52 y.o. female  : 1975    MRN: [id number]  DATE: 2023  TIME: 10:18 AM    Assessment and Plan   1. Puncture wound of left thigh, subsequent encounter  - suspect hematoma of left thigh but has increased swelling, induration and pain. Discussed that she should follow up with [facility] due to [facility] injury, will continue antibiotics however    -   CT lower extremity w contrast left; Future; Expected date: 2023  -     amoxicillin-clavulanate (AUGMENTIN) 875-125 mg per tablet; Take 1 tablet by mouth every 12 (twelve) hours for 7 days  -     CBC and differential; Future  -     Comprehensive metabolic panel; Future    2. Screening for colon cancer  - pt will call to schedule colonoscopy     3. Screening mammogram for breast cancer  -     Mammo screening bilateral w 3d & cad; Future; Expected date: 2023    4. Dog bite, subsequent encounter  -     CT lower extremity w contrast left; Future; Expected date: 2023  -     amoxicillin-clavulanate (AUGMENTIN) 875-125 mg per tablet; Take 1 tablet by mouth every 12 (twelve) hours for 7 days  -     CBC and differential; Future  -     Comprehensive metabolic panel; Future    5. Hematoma of left thigh, subsequent encounter  -     CT lower extremity w contrast left; Future; Expected date: 2023  -     CBC and differential; Future  -     Comprehensive metabolic panel; Future    6. BV (bacterial vaginosis)  -     VAGINOSIS DNA PROBE (AFFIRM); Future    7. Prediabetes  -     HEMOGLOBIN A1C W/ EAG ESTIMATION; Future             Chief Complaint     Chief Complaint   Patient presents with   • Follow-up     Reports being bitten by dog while at work on  on right arm and left thigh         History of Present Illness       53yo female with history of HTN, pre-diabetes, gastric bypass here for 6 month follow up. 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  Pt went to ER and given Augmentin. Following with [facility] due to [facility] claim. Reports improvement in right forearm injury but left thigh more swollen and tender. Finishes antibiotics today. Denies fever/chills but does have pain and paresthesia in left lower leg. Pre-diabetes: Reports borrowing CGM and notices BG drops after eating. History of RNY bypass. Had to cancel colonoscopy due to being busy with work and school and caring for elderly parents. Review of Systems   Review of Systems   Constitutional: Negative for appetite change, chills, fatigue and fever. Respiratory: Negative for shortness of breath. Genitourinary: Positive for vaginal discharge. Skin: Positive for color change and wound.          Current Medications       Current Outpatient Medications:   •  amoxicillin-clavulanate (AUGMENTIN) 875-125 mg per tablet, Take 1 tablet by mouth every 12 (twelve) hours for 7 days, Disp: 14 tablet, Rfl: 0  •  ascorbic acid (VITAMIN C) 250 mg tablet, Take 1 tablet (250 mg total) by mouth daily With FeSO4, Disp: 90 tablet, Rfl: 1  •  azelastine (ASTELIN) 0.1 % nasal spray, 2 sprays into each nostril 2 (two) times a day Use in each nostril as directed, Disp: 30 mL, Rfl: 1  •  cetirizine (ZyrTEC) 10 mg tablet, , Disp: , Rfl:   •  clobetasol (TEMOVATE) 0.05 % cream, Apply topically 2 (two) times a day, Disp: 60 g, Rfl: 1  •  cyanocobalamin 1,000 mcg/mL, Inject 1 mL (1,000 mcg total) into a muscle every 30 (thirty) days, Disp: 10 mL, Rfl: 1  •  dicyclomine (BENTYL) 10 mg capsule, Take 1 capsule (10 mg total) by mouth 4 (four) times a day as needed (abdominal pain), Disp: 120 capsule, Rfl: 5  •  DULoxetine (CYMBALTA) 60 mg delayed release capsule, Take 1 capsule (60 mg total) by mouth daily (Patient taking differently: Take 120 mg by mouth daily), Disp: 180 capsule, Rfl: 3  •  ergocalciferol (VITAMIN D2) 50,000 units, Take 1 capsule (50,000 Units total) by mouth every 14 (fourteen) days for 12 doses (Patient taking differently: Take 1,000 Units by mouth in the morning), Disp: 12 capsule, Rfl: 3  •  esomeprazole (NexIUM) 20 mg capsule, TAKE ONE CAPSULE BY MOUTH EVERY DAY IN THE EARLY MORNING, Disp: 30 capsule, Rfl: 11  •  famotidine (PEPCID) 40 MG tablet, Take 1 tablet (40 mg total) by mouth daily at bedtime as needed for heartburn, Disp: 30 tablet, Rfl: 1  •  ferrous sulfate 324 (65 Fe) mg, Take 2-3 tabs every other day with orange juice or vitamin C, Disp: 90 tablet, Rfl: 1  •  folic acid (FOLVITE) 330 mcg tablet, Take 1 tablet (400 mcg total) by mouth daily, Disp: 90 tablet, Rfl: 0  •  Insulin Syringe-Needle U-100 30G 1 ML MISC, Use every 30 (thirty) days for 1 dose For use with IM B12, Disp: 100 each, Rfl: 1  •  metoprolol succinate (TOPROL-XL) 50 mg 24 hr tablet, Take 1 tablet (50 mg total) by mouth daily, Disp: 90 tablet, Rfl: 3  •  montelukast (SINGULAIR) 10 mg tablet, Take 1 tablet (10 mg total) by mouth daily at bedtime, Disp: 90 tablet, Rfl: 1  •  mupirocin (BACTROBAN) 2 % ointment, Apply topically 3 (three) times a day, Disp: 30 g, Rfl: 3  •  ondansetron (ZOFRAN) 8 mg tablet, Take 1 tablet (8 mg total) by mouth every 8 (eight) hours as needed for nausea or vomiting, Disp: 20 tablet, Rfl: 3  •  albuterol (ProAir HFA) 90 mcg/act inhaler, Inhale 2 puffs every 6 (six) hours as needed for wheezing or shortness of breath (Patient not taking: Reported on 7/27/2023), Disp: 8.5 g, Rfl: 1  •  fluticasone (FLONASE) 50 mcg/act nasal spray, 2 sprays into each nostril daily (Patient not taking: Reported on 9/19/2022), Disp: 11.1 mL, Rfl: 0    Current Allergies     Allergies as of 07/27/2023 - Reviewed 07/27/2023   Allergen Reaction Noted   • Bee pollen  09/29/2016   • Pollen extract  09/29/2016            The following portions of the patient's history were reviewed and updated as appropriate: allergies, current medications, past family history, past medical history, past social history, past surgical history and problem list. Past Medical History:   Diagnosis Date   • Anemia 2011   • Chronic back pain     secondary to spinal stenosis   • Environmental allergies    • GERD (gastroesophageal reflux disease)    • Hypertension    • Migraine    • Spinal stenosis        Past Surgical History:   Procedure Laterality Date   • BARIATRIC SURGERY  2011   • CHOLECYSTECTOMY     • GASTRIC BYPASS  2011       Family History   Problem Relation Age of Onset   • Diabetes Mother    • Hypertension Mother    • Endometrial cancer Mother    • Cancer Mother         Endometrial   • Diabetes Father    • Hypertension Father    • Heart disease Father    • Prostate cancer Father    • Cancer Father         Prostate   • Hypertension Sister    • No Known Problems Sister    • Hypertension Maternal Grandmother    • Heart disease Maternal Grandmother    • Diabetes Maternal Grandmother    • No Known Problems Maternal Grandfather    • Breast cancer Paternal Grandmother    • Cancer Paternal Grandmother         Laryngeal and breast   • Prostate cancer Paternal Grandfather    • Bone cancer Paternal Grandfather    • Cancer Paternal Grandfather         Prostate         Medications have been verified. Objective   /76   Pulse 88   Temp 98.1 °F (36.7 °C) (Tympanic)   Resp 16   Ht 5' 7" (1.702 m)   Wt 117 kg (257 lb 3.2 oz)   SpO2 98%   BMI 40.28 kg/m²        Physical Exam     Physical Exam  Vitals reviewed. Constitutional:       General: She is not in acute distress. Appearance: She is obese. Cardiovascular:      Rate and Rhythm: Normal rate and regular rhythm. Heart sounds: No murmur heard. No friction rub. No gallop. Pulmonary:      Effort: Pulmonary effort is normal. No respiratory distress. Breath sounds: No wheezing, rhonchi or rales. Skin:     Findings: Ecchymosis present. Neurological:      General: No focal deficit present. Mental Status: She is alert.    Psychiatric:         Mood and Affect: Mood and affect normal.         Speech: Speech normal.         Behavior: Behavior normal. Behavior is cooperative.                  Results:  Lab Results   Component Value Date    SODIUM 136 11/17/2022    K 4.0 11/17/2022     11/17/2022    CO2 25 11/17/2022    BUN 12 11/17/2022    CREATININE 0.65 11/17/2022    GLUC 112 09/19/2022    CALCIUM 9.3 11/17/2022       Lab Results   Component Value Date    HGBA1C 6.3 (H) 11/17/2022       Lab Results   Component Value Date    WBC 6.98 11/17/2022    HGB 13.8 11/17/2022    HCT 43.0 11/17/2022    MCV 81 (L) 11/17/2022     11/17/2022

## 2023-07-28 ENCOUNTER — PATIENT MESSAGE (OUTPATIENT)
Dept: FAMILY MEDICINE CLINIC | Facility: CLINIC | Age: 48
End: 2023-07-28

## 2023-07-28 DIAGNOSIS — E55.9 VITAMIN D DEFICIENCY: ICD-10-CM

## 2023-07-28 DIAGNOSIS — E53.8 B12 DEFICIENCY: ICD-10-CM

## 2023-07-28 RX ORDER — ERGOCALCIFEROL 1.25 MG/1
50000 CAPSULE ORAL WEEKLY
Qty: 12 CAPSULE | Refills: 1 | Status: SHIPPED | OUTPATIENT
Start: 2023-07-28 | End: 2023-10-14

## 2023-07-29 LAB
CANDIDA RRNA VAG QL PROBE: NEGATIVE
G VAGINALIS RRNA GENITAL QL PROBE: NEGATIVE
T VAGINALIS RRNA GENITAL QL PROBE: NEGATIVE

## 2023-07-30 ENCOUNTER — OCCMED (OUTPATIENT)
Dept: URGENT CARE | Facility: CLINIC | Age: 48
End: 2023-07-30
Payer: OTHER MISCELLANEOUS
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
## 2023-07-30 DIAGNOSIS — S70.12XD HEMATOMA OF LEFT THIGH, SUBSEQUENT ENCOUNTER: Primary | ICD-10-CM

## 2023-07-30 PROCEDURE — 99213 OFFICE O/P EST LOW 20 MIN: CPT

## 2023-07-31 RX ORDER — CALCIUM CARB/VITAMIN D3/VIT K1 500-100-40
TABLET,CHEWABLE ORAL
Qty: 10 EACH | Refills: 3 | Status: SHIPPED | OUTPATIENT
Start: 2023-07-31

## 2023-07-31 RX ORDER — GLUCOSAMINE HCL/CHONDROITIN SU 500-400 MG
CAPSULE ORAL
Qty: 100 EACH | Refills: 3 | Status: SHIPPED | OUTPATIENT
Start: 2023-07-31

## 2023-07-31 RX ORDER — CYANOCOBALAMIN 1000 UG/ML
INJECTION, SOLUTION INTRAMUSCULAR; SUBCUTANEOUS
Qty: 10 ML | Refills: 1 | Status: SHIPPED | OUTPATIENT
Start: 2023-07-31

## 2023-08-03 ENCOUNTER — OCCMED (OUTPATIENT)
Dept: URGENT CARE | Facility: CLINIC | Age: 48
End: 2023-08-03
Payer: OTHER MISCELLANEOUS

## 2023-08-03 DIAGNOSIS — S80.12XD HEMATOMA OF LEG, LEFT, SUBSEQUENT ENCOUNTER: ICD-10-CM

## 2023-08-03 DIAGNOSIS — T14.90XA OCCUPATIONAL INJURY: Primary | ICD-10-CM

## 2023-08-03 PROCEDURE — 99213 OFFICE O/P EST LOW 20 MIN: CPT | Performed by: PHYSICIAN ASSISTANT

## 2023-08-03 PROCEDURE — 90675 RABIES VACCINE IM: CPT

## 2023-08-03 PROCEDURE — 90471 IMMUNIZATION ADMIN: CPT | Performed by: PHYSICIAN ASSISTANT

## 2023-08-03 PROCEDURE — 90675 RABIES VACCINE IM: CPT | Performed by: PHYSICIAN ASSISTANT

## 2023-08-04 ENCOUNTER — HOSPITAL ENCOUNTER (OUTPATIENT)
Dept: ULTRASOUND IMAGING | Facility: CLINIC | Age: 48
Discharge: HOME | End: 2023-08-04
Payer: COMMERCIAL

## 2023-08-04 DIAGNOSIS — R10.2 PELVIC PAIN: ICD-10-CM

## 2023-08-04 PROCEDURE — 76856 US EXAM PELVIC COMPLETE: CPT

## 2023-08-04 PROCEDURE — 76830 TRANSVAGINAL US NON-OB: CPT

## 2023-08-10 ENCOUNTER — TELEPHONE (OUTPATIENT)
Dept: OBGYN CLINIC | Facility: MEDICAL CENTER | Age: 48
End: 2023-08-10

## 2023-08-10 ENCOUNTER — OCCMED (OUTPATIENT)
Dept: URGENT CARE | Facility: CLINIC | Age: 48
End: 2023-08-10
Payer: OTHER MISCELLANEOUS

## 2023-08-10 DIAGNOSIS — S70.12XD HEMATOMA OF LEFT THIGH, SUBSEQUENT ENCOUNTER: Primary | ICD-10-CM
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
## 2023-08-10 PROCEDURE — 99213 OFFICE O/P EST LOW 20 MIN: CPT | Performed by: EMERGENCY MEDICINE

## 2023-08-10 NOTE — TELEPHONE ENCOUNTER
Mahnaz Pimentel from Radiology called stating that there were significant findings on the patient's ultrasound. Please review when you get a chance. Thank you!

## 2023-08-14 ENCOUNTER — OCCMED (OUTPATIENT)
Dept: URGENT CARE | Facility: CLINIC | Age: 48
End: 2023-08-14
Payer: OTHER MISCELLANEOUS
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
## 2023-08-14 DIAGNOSIS — S70.12XD HEMATOMA OF LEFT THIGH, SUBSEQUENT ENCOUNTER: Primary | ICD-10-CM

## 2023-08-14 PROCEDURE — 99213 OFFICE O/P EST LOW 20 MIN: CPT

## 2023-08-15 ENCOUNTER — PREP FOR PROCEDURE (OUTPATIENT)
Dept: SURGERY | Facility: CLINIC | Age: 48
End: 2023-08-15

## 2023-08-15 ENCOUNTER — CONSULT (OUTPATIENT)
Dept: SURGERY | Facility: CLINIC | Age: 48
End: 2023-08-15
Payer: COMMERCIAL

## 2023-08-15 VITALS
WEIGHT: 257 LBS | SYSTOLIC BLOOD PRESSURE: 100 MMHG | HEIGHT: 67 IN | DIASTOLIC BLOOD PRESSURE: 78 MMHG | HEART RATE: 85 BPM | BODY MASS INDEX: 40.34 KG/M2

## 2023-08-15 DIAGNOSIS — R22.42 MASS OF LEFT THIGH: Primary | ICD-10-CM

## 2023-08-15 PROBLEM — R06.09 DYSPNEA ON EXERTION: Status: RESOLVED | Noted: 2020-06-01 | Resolved: 2023-08-15

## 2023-08-15 PROCEDURE — 99203 OFFICE O/P NEW LOW 30 MIN: CPT | Performed by: SURGERY

## 2023-08-15 NOTE — PROGRESS NOTES
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 She had teeth marks but did not require any suturing. X-rays of the left femur were normal.    Patient has developed a mass at the site of the dog bite which has persisted since. Patient complains of pain upon ambulation. No fever    The following portions of the patient's history were reviewed and updated as appropriate: allergies, current medications, past family history, past medical history, past social history, past surgical history, and problem list.    Review of Systems    Patient had Toni-en-Y gastric bypass performed in 2011. Patient had laparoscopic cholecystectomy in 2012    Patient had a laparoscopic lysis of adhesions. Essential hypertension  No cardiopulmonary disease  Back ache    Patient lives by herself. Next of kin are her sister and parents. Objective:    /78   Pulse 85   Ht 5' 7" (1.702 m)   Wt 117 kg (257 lb)   BMI 40.25 kg/m²      Physical Exam    No pallor or icterus    Heart sounds are normal  Chest is clear to auscultation  Abdominal exam reveals laparoscopy scars    Extremity exam:  On the medial aspect of the left thigh is a large irregularly shaped mass which is firm in nature. This measures 10 x 10 cm. Clinically this feels like an organized hematoma. There is no evidence of infection.

## 2023-08-15 NOTE — H&P (VIEW-ONLY)
Assessment/Plan:     Diagnoses and all orders for this visit:    Mass of left thigh      Probable organized hematoma from a dog bite    Excision of mass left thigh/evacuation of hematoma under general anesthesia at 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 She had teeth marks but did not require any suturing. X-rays of the left femur were normal.    Patient has developed a mass at the site of the dog bite which has persisted since. Patient complains of pain upon ambulation. No fever    The following portions of the patient's history were reviewed and updated as appropriate: allergies, current medications, past family history, past medical history, past social history, past surgical history, and problem list.    Review of Systems    Patient had Toni-en-Y gastric bypass performed in 2011. Patient had laparoscopic cholecystectomy in 2012    Patient had a laparoscopic lysis of adhesions. Essential hypertension  No cardiopulmonary disease  Back ache    Patient lives by herself. Next of kin are her sister and parents. Objective:    /78   Pulse 85   Ht 5' 7" (1.702 m)   Wt 117 kg (257 lb)   BMI 40.25 kg/m²      Physical Exam    No pallor or icterus    Heart sounds are normal  Chest is clear to auscultation  Abdominal exam reveals laparoscopy scars    Extremity exam:  On the medial aspect of the left thigh is a large irregularly shaped mass which is firm in nature. This measures 10 x 10 cm. Clinically this feels like an organized hematoma. There is no evidence of infection.

## 2023-08-17 ENCOUNTER — PATIENT MESSAGE (OUTPATIENT)
Dept: FAMILY MEDICINE CLINIC | Facility: CLINIC | Age: 48
End: 2023-08-17

## 2023-08-17 DIAGNOSIS — B37.9 CANDIDIASIS: Primary | ICD-10-CM

## 2023-08-17 RX ORDER — NYSTATIN 100000 [USP'U]/G
POWDER TOPICAL 2 TIMES DAILY
Qty: 60 G | Refills: 1 | Status: SHIPPED | OUTPATIENT
Start: 2023-08-17

## 2023-08-18 ENCOUNTER — OCCMED (OUTPATIENT)
Dept: URGENT CARE | Facility: CLINIC | Age: 48
End: 2023-08-18
Payer: OTHER MISCELLANEOUS

## 2023-08-18 DIAGNOSIS — S80.12XA HEMATOMA OF LEFT LOWER LEG: Primary | ICD-10-CM

## 2023-08-18 PROCEDURE — 99213 OFFICE O/P EST LOW 20 MIN: CPT

## 2023-08-22 ENCOUNTER — HOSPITAL ENCOUNTER (OUTPATIENT)
Age: 48
Discharge: HOME | End: 2023-08-22
Payer: COMMERCIAL

## 2023-08-22 VITALS — BODY MASS INDEX: 42.77 KG/M2 | HEIGHT: 65 IN

## 2023-08-22 DIAGNOSIS — Z12.31 SCREENING MAMMOGRAM FOR BREAST CANCER: ICD-10-CM

## 2023-08-22 PROCEDURE — 77063 BREAST TOMOSYNTHESIS BI: CPT

## 2023-08-22 PROCEDURE — 77067 SCR MAMMO BI INCL CAD: CPT

## 2023-08-23 ENCOUNTER — ANESTHESIA EVENT (OUTPATIENT)
Dept: PERIOP | Facility: HOSPITAL | Age: 48
End: 2023-08-23
Payer: OTHER MISCELLANEOUS

## 2023-08-25 ENCOUNTER — TELEPHONE (OUTPATIENT)
Dept: URGENT CARE | Facility: CLINIC | Age: 48
End: 2023-08-25

## 2023-08-25 RX ORDER — ACETAMINOPHEN 500 MG
500 TABLET ORAL EVERY 6 HOURS PRN
COMMUNITY

## 2023-08-25 NOTE — TELEPHONE ENCOUNTER
Called pt, confirmed name and date of birth. Reviewed MRI results - showing subacute and older bleeding. Continue with plan of care. No additional questions at this time.

## 2023-08-25 NOTE — PRE-PROCEDURE INSTRUCTIONS
Pre-Surgery Instructions:   Medication Instructions   • acetaminophen (TYLENOL) 500 mg tablet Uses PRN- OK to take day of surgery   • ascorbic acid (VITAMIN C) 250 mg tablet Stop taking 6 days prior to surgery   • azelastine (ASTELIN) 0.1 % nasal spray Take day of surgery. • cetirizine (ZyrTEC) 10 mg tablet Take day of surgery. • cyanocobalamin 1,000 mcg/mL weekly inj next dose 8/28   • dicyclomine (BENTYL) 10 mg capsule Uses PRN- OK to take day of surgery   • DULoxetine (CYMBALTA) 60 mg delayed release capsule Take day of surgery. • ergocalciferol (VITAMIN D2) 50,000 units weekly next dose 8/28   • esomeprazole (NexIUM) 20 mg capsule Take day of surgery. • famotidine (PEPCID) 40 MG tablet Take night before surgery   • ferrous sulfate 324 (65 Fe) mg Stop taking 6 days prior to surger   • folic acid (FOLVITE) 695 mcg tablet Stop taking 6 days prior to surger   • metoprolol succinate (TOPROL-XL) 50 mg 24 hr tablet Take day of surgery. • mupirocin (BACTROBAN) 2 % ointment Hold day of surgery. • nystatin (MYCOSTATIN) powder Hold day of surgery. • ondansetron (ZOFRAN) 8 mg tablet Uses PRN- OK to take day of surgery   Medication instructions for day surgery reviewed. Please use only a sip of water to take your instructed medications. Avoid all over the counter vitamins, supplements and NSAIDS for one week prior to surgery per anesthesia guidelines. Tylenol is ok to take as needed. You will receive a call one business day prior to surgery with an arrival time and hospital directions. If your surgery is scheduled on a Monday, the hospital will be calling you on the Friday prior to your surgery. If you have not heard from anyone by 8pm, please call the hospital supervisor through the hospital  at [phone]. [place] [phone]). Do not eat or drink anything after midnight the night before your surgery, including candy, mints, lifesavers, or chewing gum.  Do not drink alcohol 24hrs before your surgery. Try not to smoke at least 24hrs before your surgery. Follow the pre surgery showering instructions as listed in the [facility] Surgical Experience Booklet” or otherwise provided by your surgeon's office. Do not shave the surgical area 24 hours before surgery. Do not apply any lotions, creams, including makeup, cologne, deodorant, or perfumes after showering on the day of your surgery. No contact lenses, eye make-up, or artificial eyelashes. Remove nail polish, including gel polish, and any artificial, gel, or acrylic nails if possible. Remove all jewelry including rings and body piercing jewelry. Wear causal clothing that is easy to take on and off. Consider your type of surgery. Keep any valuables, jewelry, piercings at home. Please bring any specially ordered equipment (sling, braces) if indicated. Arrange for a responsible person to drive you to and from the hospital on the day of your surgery. Visitor Guidelines discussed. Call the surgeon's office with any new illnesses, exposures, or additional questions prior to surgery. Please reference your [facility] Surgical Experience Booklet” for additional information to prepare for your upcoming surgery.

## 2023-09-01 ENCOUNTER — HOSPITAL ENCOUNTER (OUTPATIENT)
Facility: HOSPITAL | Age: 48
Setting detail: OUTPATIENT SURGERY
Discharge: HOME | End: 2023-09-01
Attending: SURGERY | Admitting: SURGERY
Payer: OTHER MISCELLANEOUS

## 2023-09-01 ENCOUNTER — ANESTHESIA (OUTPATIENT)
Dept: PERIOP | Facility: HOSPITAL | Age: 48
End: 2023-09-01
Payer: OTHER MISCELLANEOUS

## 2023-09-01 VITALS
SYSTOLIC BLOOD PRESSURE: 112 MMHG | BODY MASS INDEX: 39.55 KG/M2 | HEART RATE: 99 BPM | RESPIRATION RATE: 18 BRPM | OXYGEN SATURATION: 93 % | WEIGHT: 252 LBS | DIASTOLIC BLOOD PRESSURE: 62 MMHG | TEMPERATURE: 97.1 F | HEIGHT: 67 IN

## 2023-09-01 DIAGNOSIS — R22.42 MASS OF LEFT THIGH: Primary | ICD-10-CM

## 2023-09-01 LAB
EXT PREGNANCY TEST URINE: NEGATIVE
EXT. CONTROL: NORMAL

## 2023-09-01 PROCEDURE — 27301 DRAIN THIGH/KNEE LESION: CPT | Performed by: PHYSICIAN ASSISTANT

## 2023-09-01 PROCEDURE — 88305 TISSUE EXAM BY PATHOLOGIST: CPT | Performed by: PATHOLOGY

## 2023-09-01 PROCEDURE — 81025 URINE PREGNANCY TEST: CPT | Performed by: STUDENT IN AN ORGANIZED HEALTH CARE EDUCATION/TRAINING PROGRAM

## 2023-09-01 PROCEDURE — 27301 DRAIN THIGH/KNEE LESION: CPT | Performed by: SURGERY

## 2023-09-01 RX ORDER — MAGNESIUM HYDROXIDE 1200 MG/15ML
LIQUID ORAL AS NEEDED
Status: DISCONTINUED | OUTPATIENT
Start: 2023-09-01 | End: 2023-09-01 | Stop reason: HOSPADM

## 2023-09-01 RX ORDER — PROPOFOL 10 MG/ML
INJECTION, EMULSION INTRAVENOUS AS NEEDED
Status: DISCONTINUED | OUTPATIENT
Start: 2023-09-01 | End: 2023-09-01

## 2023-09-01 RX ORDER — FENTANYL CITRATE/PF 50 MCG/ML
25 SYRINGE (ML) INJECTION
Status: DISCONTINUED | OUTPATIENT
Start: 2023-09-01 | End: 2023-09-01 | Stop reason: HOSPADM

## 2023-09-01 RX ORDER — METOCLOPRAMIDE HYDROCHLORIDE 5 MG/ML
10 INJECTION INTRAMUSCULAR; INTRAVENOUS ONCE AS NEEDED
Status: DISCONTINUED | OUTPATIENT
Start: 2023-09-01 | End: 2023-09-01 | Stop reason: HOSPADM

## 2023-09-01 RX ORDER — GLYCOPYRROLATE 0.2 MG/ML
INJECTION INTRAMUSCULAR; INTRAVENOUS AS NEEDED
Status: DISCONTINUED | OUTPATIENT
Start: 2023-09-01 | End: 2023-09-01

## 2023-09-01 RX ORDER — ONDANSETRON 2 MG/ML
INJECTION INTRAMUSCULAR; INTRAVENOUS AS NEEDED
Status: DISCONTINUED | OUTPATIENT
Start: 2023-09-01 | End: 2023-09-01

## 2023-09-01 RX ORDER — MIDAZOLAM HYDROCHLORIDE 2 MG/2ML
INJECTION, SOLUTION INTRAMUSCULAR; INTRAVENOUS AS NEEDED
Status: DISCONTINUED | OUTPATIENT
Start: 2023-09-01 | End: 2023-09-01

## 2023-09-01 RX ORDER — DIPHENHYDRAMINE HYDROCHLORIDE 50 MG/ML
12.5 INJECTION INTRAMUSCULAR; INTRAVENOUS ONCE AS NEEDED
Status: DISCONTINUED | OUTPATIENT
Start: 2023-09-01 | End: 2023-09-01 | Stop reason: HOSPADM

## 2023-09-01 RX ORDER — SODIUM CHLORIDE, SODIUM LACTATE, POTASSIUM CHLORIDE, CALCIUM CHLORIDE 600; 310; 30; 20 MG/100ML; MG/100ML; MG/100ML; MG/100ML
50 INJECTION, SOLUTION INTRAVENOUS CONTINUOUS
Status: DISCONTINUED | OUTPATIENT
Start: 2023-09-01 | End: 2023-09-01 | Stop reason: HOSPADM

## 2023-09-01 RX ORDER — FENTANYL CITRATE 50 UG/ML
INJECTION, SOLUTION INTRAMUSCULAR; INTRAVENOUS AS NEEDED
Status: DISCONTINUED | OUTPATIENT
Start: 2023-09-01 | End: 2023-09-01

## 2023-09-01 RX ORDER — LIDOCAINE HYDROCHLORIDE 10 MG/ML
0.5 INJECTION, SOLUTION EPIDURAL; INFILTRATION; INTRACAUDAL; PERINEURAL ONCE AS NEEDED
Status: DISCONTINUED | OUTPATIENT
Start: 2023-09-01 | End: 2023-09-01 | Stop reason: HOSPADM

## 2023-09-01 RX ORDER — ALBUTEROL SULFATE 2.5 MG/3ML
2.5 SOLUTION RESPIRATORY (INHALATION) ONCE AS NEEDED
Status: DISCONTINUED | OUTPATIENT
Start: 2023-09-01 | End: 2023-09-01 | Stop reason: HOSPADM

## 2023-09-01 RX ORDER — CEFAZOLIN SODIUM 2 G/50ML
2000 SOLUTION INTRAVENOUS ONCE
Status: COMPLETED | OUTPATIENT
Start: 2023-09-01 | End: 2023-09-01

## 2023-09-01 RX ORDER — OXYCODONE HYDROCHLORIDE AND ACETAMINOPHEN 5; 325 MG/1; MG/1
1 TABLET ORAL EVERY 4 HOURS PRN
Status: DISCONTINUED | OUTPATIENT
Start: 2023-09-01 | End: 2023-09-01 | Stop reason: HOSPADM

## 2023-09-01 RX ORDER — OXYCODONE HYDROCHLORIDE AND ACETAMINOPHEN 5; 325 MG/1; MG/1
1 TABLET ORAL EVERY 4 HOURS PRN
Qty: 15 TABLET | Refills: 0 | Status: SHIPPED | OUTPATIENT
Start: 2023-09-01 | End: 2023-09-11

## 2023-09-01 RX ORDER — DEXAMETHASONE SODIUM PHOSPHATE 10 MG/ML
INJECTION, SOLUTION INTRAMUSCULAR; INTRAVENOUS AS NEEDED
Status: DISCONTINUED | OUTPATIENT
Start: 2023-09-01 | End: 2023-09-01

## 2023-09-01 RX ORDER — LIDOCAINE HYDROCHLORIDE 10 MG/ML
INJECTION, SOLUTION EPIDURAL; INFILTRATION; INTRACAUDAL; PERINEURAL AS NEEDED
Status: DISCONTINUED | OUTPATIENT
Start: 2023-09-01 | End: 2023-09-01

## 2023-09-01 RX ORDER — LIDOCAINE HYDROCHLORIDE AND EPINEPHRINE 10; 10 MG/ML; UG/ML
INJECTION, SOLUTION INFILTRATION; PERINEURAL AS NEEDED
Status: DISCONTINUED | OUTPATIENT
Start: 2023-09-01 | End: 2023-09-01 | Stop reason: HOSPADM

## 2023-09-01 RX ORDER — HYDROMORPHONE HCL/PF 1 MG/ML
SYRINGE (ML) INJECTION AS NEEDED
Status: DISCONTINUED | OUTPATIENT
Start: 2023-09-01 | End: 2023-09-01

## 2023-09-01 RX ADMIN — HYDROMORPHONE HYDROCHLORIDE 0.5 MG: 1 INJECTION, SOLUTION INTRAMUSCULAR; INTRAVENOUS; SUBCUTANEOUS at 09:39

## 2023-09-01 RX ADMIN — SODIUM CHLORIDE, SODIUM LACTATE, POTASSIUM CHLORIDE, AND CALCIUM CHLORIDE 50 ML/HR: .6; .31; .03; .02 INJECTION, SOLUTION INTRAVENOUS at 09:01

## 2023-09-01 RX ADMIN — MIDAZOLAM HYDROCHLORIDE 1 MG: 1 INJECTION, SOLUTION INTRAMUSCULAR; INTRAVENOUS at 09:11

## 2023-09-01 RX ADMIN — FENTANYL CITRATE 50 MCG: 50 INJECTION, SOLUTION INTRAMUSCULAR; INTRAVENOUS at 09:23

## 2023-09-01 RX ADMIN — PROPOFOL 200 MG: 10 INJECTION, EMULSION INTRAVENOUS at 09:12

## 2023-09-01 RX ADMIN — MIDAZOLAM HYDROCHLORIDE 1 MG: 1 INJECTION, SOLUTION INTRAMUSCULAR; INTRAVENOUS at 09:10

## 2023-09-01 RX ADMIN — LIDOCAINE HYDROCHLORIDE 50 MG: 10 INJECTION, SOLUTION EPIDURAL; INFILTRATION; INTRACAUDAL; PERINEURAL at 09:12

## 2023-09-01 RX ADMIN — OXYCODONE AND ACETAMINOPHEN 1 TABLET: 5; 325 TABLET ORAL at 11:47

## 2023-09-01 RX ADMIN — FENTANYL CITRATE 25 MCG: 50 INJECTION, SOLUTION INTRAMUSCULAR; INTRAVENOUS at 09:39

## 2023-09-01 RX ADMIN — GLYCOPYRROLATE 0.2 MG: 0.2 INJECTION, SOLUTION INTRAMUSCULAR; INTRAVENOUS at 09:18

## 2023-09-01 RX ADMIN — FENTANYL CITRATE 25 MCG: 50 INJECTION, SOLUTION INTRAMUSCULAR; INTRAVENOUS at 10:12

## 2023-09-01 RX ADMIN — DEXAMETHASONE SODIUM PHOSPHATE 10 MG: 10 INJECTION, SOLUTION INTRAMUSCULAR; INTRAVENOUS at 09:30

## 2023-09-01 RX ADMIN — ONDANSETRON 4 MG: 2 INJECTION INTRAMUSCULAR; INTRAVENOUS at 10:27

## 2023-09-01 RX ADMIN — HYDROMORPHONE HYDROCHLORIDE 0.5 MG: 1 INJECTION, SOLUTION INTRAMUSCULAR; INTRAVENOUS; SUBCUTANEOUS at 09:21

## 2023-09-01 RX ADMIN — CEFAZOLIN SODIUM 2000 MG: 2 SOLUTION INTRAVENOUS at 09:14

## 2023-09-01 NOTE — ANESTHESIA PREPROCEDURE EVALUATION
Procedure:  EXCISION BIOPSY TISSUE LESION/MASS LOWER EXTREMITY/thigh (Left: Thigh)    Relevant Problems   CARDIO   (+) Essential hypertension      GI/HEPATIC   (+) Gastroesophageal reflux disease      HEMATOLOGY   (+) Iron deficiency anemia secondary to inadequate dietary iron intake     BMI 39     Physical Exam    Airway    Mallampati score: II  TM Distance: >3 FB  Neck ROM: full     Dental   No notable dental hx     Cardiovascular      Pulmonary  Pulmonary exam normal     Other Findings        Anesthesia Plan  ASA Score- 3     Anesthesia Type- general with ASA Monitors. Additional Monitors:   Airway Plan:           Plan Factors-    Chart reviewed. EKG reviewed. Existing labs reviewed. Patient summary reviewed. Induction- intravenous. Postoperative Plan-     Informed Consent- Anesthetic plan and risks discussed with patient. I personally reviewed this patient with the CRNA. Discussed and agreed on the Anesthesia Plan with the CRNA. Robert Spine

## 2023-09-01 NOTE — OP NOTE
OPERATIVE REPORT  PATIENT NAME: Karis Mares    :  1975  MRN: [id number]  Pt Location: [facility] OR ROOM 02    SURGERY DATE: 2023    Surgeon(s) and Role:     * Kevin Sunshine MD - Primary     * Makayla Hidalgo PA-C - Assisting    Preop Diagnosis:  Mass of left thigh [R22.42]  Size 10 cm  Dog bite    Post-Op Diagnosis Codes:     * Mass of left thigh [R22.42]   Organized hematoma left thigh    Procedure(s):  Left - EXCISION BIOPSY TISSUE LESION/MASS LOWER EXTREMITY/thigh  Excision of mass left thigh    Specimen(s):  ID Type Source Tests Collected by Time Destination   1 : HEMATOMA LEFT THIGH Tissue Leg, Left TISSUE EXAM Kevin Sunshine MD 2023 [id number]        Estimated Blood Loss:   50 mL    Drains:  Open Drain Anterior; Left Thigh (Active)   Dressing Status Clean;Dry 23 1029   Number of days: 0       Anesthesia Type:   General anesthesia via laryngeal airway and local anesthesia uses 30 mils of 0.5% Marcaine with 1 in 200,000 epinephrine    Operative Indications: Mass of left thigh [R22.42]  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 The dog bit her on the left thigh. She had several teeth marks but these did not require any suturing. Subsequently she developed a hematoma on the thigh which has persisted since. Patient has pain at the site. Preoperative MRI showed this to be an organizing hematoma. Operative Findings:  Patient had a organizing hematoma with old blood in the center and the periphery was organizing and calcifying rim. Complications:   None    Procedure and Technique:  Patient was brought to the operating room suite. She was identified by me. She was laid supine on the operating table. General anesthesia was given via laryngeal airway. Patient was given preoperative dose of cefazolin. Left hip and left knee were slightly flexed.       The thigh was cleaned with ChloraPrep. Patient was draped. Local infiltration anesthesia was given. A longitudinal incision was made over the mass. Skin and subcutaneous tissues were divided. The hematoma cavity was entered. Old blood was drained. Surrounding this cavity was a thick rind which was excised. This was done until healthy tissues were reached. We also sacrificed a portion of the skin. Hemostasis was then obtained. Wound cavity was copiously irrigated. Closure was performed with a Penrose drain in the wound cavity. The skin incision was closed with interrupted vertical mattress 3-0 nylon sutures. Dressings were applied. Patient tolerated procedure well. I was present for the entire procedure. No qualified surgical resident was available. The services of physician assistant were required for retraction and suturing.     Patient Disposition:  PACU         SIGNATURE:  Brittany Carpenter MD  DATE: September 1, 2023  TIME: 10:53 AM

## 2023-09-01 NOTE — ANESTHESIA POSTPROCEDURE EVALUATION
Post-Op Assessment Note    CV Status:  Stable  Pain Score: 0    Pain management: adequate     Mental Status:  Alert and awake   Hydration Status:  Euvolemic and stable   PONV Controlled:  Controlled   Airway Patency:  Patent and adequate      Post Op Vitals Reviewed: Yes      Staff: CRNA         No notable events documented.     BP  121/65    Temp      Pulse 102   Resp 14   SpO2 96% 2LNC

## 2023-09-01 NOTE — DISCHARGE INSTR - AVS FIRST PAGE
Postoperative Care Instructions      1. General: You may feel pulling sensations around the wound or funny aches and pains around the incisions. This is normal. Even minor surgery is a change in your body and this is your body's reaction to it. If you have had abdominal surgery, it may help to support the incision with a small pillow or blanket for comfort when moving or coughing. 2. Wound care:  Change dressing daily or as needed. Dr. Kervin Valentino will see you on Tuesday (9/5) to take out the drain. The glue over the incisions will fall off over the next week or two. If you have staples or stitches, they will be removed by the physician at your follow up appointment. 3. Showering: You may shower. Please do not soak wound in standing water such as a bath, hot tub, pool, lake, etc. Do not scrub or use exfoliants on the surgical wounds. 4. Activity: You may go up and down stairs, walk as much as you are comfortable, but walk at least 3 times each day. If you have had abdominal surgery, do not perform any strenuous exercise or lift anything heavier than 15 pounds for at least 4 weeks, unless cleared by your physician. 5. Diet: You may resume your regular diet. Please drink lots of water. 6. Medications: Resume all of your previous medications, unless told otherwise by the doctor. A good option for pain control is to start with acetaminophen (Tylenol) 650mg and ibuprofen (Advil) 600mg and alternate taking them every 3 hours. If this is not sufficient, you make take the narcotic pain medicine as prescribed. You do not need to take the narcotic pain medication unless you are having significant pain and discomfort. Please take the narcotic medication with food. Ensure that you do not take more than 4000 mg of Tylenol per day. 7. Driving: You will need someone to drive you home on the day of surgery. Do not drive or make any important decisions while on narcotic pain medication.  Generally, you may drive 48 hours after you've stopped taking all narcotic pain medications. Generally, you may drive when you are off all narcotic pain medications, and you can turn in your seat comfortably to check your blind spot and area able to slam on the brakes while driving if needed. 8. Upset Stomach: You may take Maalox, Tums, or similar items for an upset stomach. If your narcotic pain medication causes an upset stomach, do not take it on an empty stomach. Try taking it with at least some crackers or toast.     9. Constipation: Patients often experience constipation after surgery. We recommend starting an over-the-counter medication for this, such as Metamucil, Senokot, Colace, milk of magnesia, etc. You may stop taking these medications a couple days after your last dose of narcotic medication. If you experience significant nausea or vomiting after abdominal surgery, call the office before trying any of these medications. 10. Call the office: If you are experiencing any of the following: fevers above 101.5°, significant nausea or vomiting, if the wound develops drainage and/or excessive redness around the wound, or if you have significant diarrhea or other worsening symptoms.     11. Pain: A prescription for narcotic pain medication will be sent to your pharmacy upon discharge from the hospital.

## 2023-09-07 PROCEDURE — 88305 TISSUE EXAM BY PATHOLOGIST: CPT | Performed by: PATHOLOGY

## 2023-09-21 ENCOUNTER — OFFICE VISIT (OUTPATIENT)
Dept: SURGERY | Facility: CLINIC | Age: 48
End: 2023-09-21

## 2023-09-21 VITALS
BODY MASS INDEX: 39.31 KG/M2 | DIASTOLIC BLOOD PRESSURE: 80 MMHG | SYSTOLIC BLOOD PRESSURE: 136 MMHG | OXYGEN SATURATION: 100 % | HEART RATE: 87 BPM | HEIGHT: 67 IN | WEIGHT: 250.5 LBS

## 2023-09-21 DIAGNOSIS — R22.42 MASS OF LEFT THIGH: Primary | ICD-10-CM

## 2023-09-21 PROCEDURE — 99024 POSTOP FOLLOW-UP VISIT: CPT | Performed by: SURGERY

## 2023-09-21 NOTE — LETTER
September 21, 2023     Patient: Maxwell Elam  YOB: 1975  Date of Visit: 9/21/2023      To Whom it May Concern:    Maxwell Elam is under my professional care. Ivy Morfin was seen in my office on 9/21/2023. Ivy Morfin may return to work on 9/23/2023 . Ivy Morfin will be on light duty 9/23/2023-9/29/2023 and may return to regular duty 10/02/2023    If you have any questions or concerns, please don't hesitate to call.          Sincerely,           Sruthi Gonzalez MD        CC: No Recipients

## 2023-09-21 NOTE — PROGRESS NOTES
Assessment/Plan:     Diagnoses and all orders for this visit:    Mass of left thigh      Organized hematoma as per pathology report    Patient will return to work with a graduated increase in intensity. 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 This had appeared after a dog bite at work and clinically felt like an organizing hematoma. Patient has recovered slowly from the surgery. She complains of pain and swelling in the leg upon prolonged standing. The following portions of the patient's history were reviewed and updated as appropriate: allergies, current medications, past family history, past medical history, past social history, past surgical history, and problem list.    Review of Systems    No fever     Objective:    /80 (BP Location: Left arm, Patient Position: Sitting, Cuff Size: Large)   Pulse 87   Ht 5' 7" (1.702 m)   Wt 114 kg (250 lb 8 oz)   LMP 08/09/2023 (Exact Date)   SpO2 100%   BMI 39.23 kg/m²      Physical Exam    The incision on the left thigh is well-healed. There is some surrounding ecchymosis which will resolve with time. All sutures removed.

## 2023-09-22 ENCOUNTER — ANNUAL EXAM (OUTPATIENT)
Dept: OBGYN CLINIC | Facility: MEDICAL CENTER | Age: 48
End: 2023-09-22
Payer: COMMERCIAL

## 2023-09-22 ENCOUNTER — APPOINTMENT (OUTPATIENT)
Dept: LAB | Facility: HOSPITAL | Age: 48
End: 2023-09-22
Payer: COMMERCIAL

## 2023-09-22 ENCOUNTER — OCCMED (OUTPATIENT)
Dept: URGENT CARE | Facility: CLINIC | Age: 48
End: 2023-09-22
Payer: OTHER MISCELLANEOUS

## 2023-09-22 VITALS
DIASTOLIC BLOOD PRESSURE: 80 MMHG | HEIGHT: 67 IN | WEIGHT: 249.9 LBS | SYSTOLIC BLOOD PRESSURE: 108 MMHG | BODY MASS INDEX: 39.22 KG/M2

## 2023-09-22 DIAGNOSIS — N93.9 ABNORMAL UTERINE BLEEDING: ICD-10-CM

## 2023-09-22 DIAGNOSIS — R22.42 LEG MASS, LEFT: ICD-10-CM

## 2023-09-22 DIAGNOSIS — Z01.419 ENCOUNTER FOR GYNECOLOGICAL EXAMINATION: Primary | ICD-10-CM

## 2023-09-22 DIAGNOSIS — R93.89 THICKENED ENDOMETRIUM: ICD-10-CM

## 2023-09-22 DIAGNOSIS — S70.12XD HEMATOMA OF LEFT THIGH, SUBSEQUENT ENCOUNTER: Primary | ICD-10-CM

## 2023-09-22 PROCEDURE — S0612 ANNUAL GYNECOLOGICAL EXAMINA: HCPCS | Performed by: STUDENT IN AN ORGANIZED HEALTH CARE EDUCATION/TRAINING PROGRAM

## 2023-09-22 PROCEDURE — 58100 BIOPSY OF UTERUS LINING: CPT | Performed by: STUDENT IN AN ORGANIZED HEALTH CARE EDUCATION/TRAINING PROGRAM

## 2023-09-22 PROCEDURE — 88305 TISSUE EXAM BY PATHOLOGIST: CPT | Performed by: PATHOLOGY

## 2023-09-22 PROCEDURE — 99213 OFFICE O/P EST LOW 20 MIN: CPT

## 2023-09-22 NOTE — PROGRESS NOTES
[facility] [address] [place] [address]    ASSESSMENT/PLAN: Chapo Zabala is a 52 y.o.  who presents for annual gynecologic exam.    Encounter for routine gynecologic examination  - Routine well woman exam completed today. - Cervical Cancer Screening: Current ASCCP Guidelines reviewed. Last Pap: 2021. Next Pap Due:   - HPV Vaccination status: Immunization series complete  - STI screening offered including HIV: Declines  - Contraceptive counseling discussed. Current contraception: condoms:   - Breast Cancer Screening: Last Mammogram 2023, ordered for . - AUB, thickened endometrium. Ultrasound reviewed. EMB recommended and performed today. Additional problems addressed at this visit:  1. Encounter for gynecological examination    2. Thickened endometrium  -     Tissue Exam          CC:  Annual Gynecologic Examination    HPI: Chapo Zabala is a 52 y.o. Kiki Ford who presents for annual gynecologic examination. She reports  no new changes to her health. She reports no breast concerns. She gets irregular heavy periods, had a recent US with thickened endometrium, and her mother had papillary serous uterine cancer. She has no vaginal discharge, vulvar or vaginal lesions, pelvic pain, or abnormal bleeding. She has no sexual health concerns and is currently sexually active with one male partner. She contracepts with condoms. She has no symptoms of pelvic organ prolapse, urinary, or fecal incontinence. She denies intimate partner violence. The following portions of the patient's history were reviewed and updated as appropriate: allergies, current medications, past family history, past medical history, obstetric history, gynecologic history, past social history, past surgical history and problem list.    Review of Systems   Constitutional: Negative for chills and fever. Respiratory: Negative for cough and shortness of breath.     Cardiovascular: Negative for chest pain and leg swelling. Gastrointestinal: Negative for abdominal pain, nausea and vomiting. Genitourinary: Negative for dysuria, frequency and urgency. Neurological: Negative for dizziness, light-headedness and headaches. Objective:  /80   Ht 5' 7" (1.702 m)   Wt 113 kg (249 lb 14.4 oz)   LMP 09/06/2023 (Exact Date)   BMI 39.14 kg/m²    Physical Exam  Chaperone present: chaparone offered, patient declined. Constitutional:       Appearance: Normal appearance. HENT:      Head: Normocephalic and atraumatic. Cardiovascular:      Rate and Rhythm: Normal rate. Pulmonary:      Effort: Pulmonary effort is normal.   Chest:   Breasts:     Breasts are symmetrical.      Right: Normal. No swelling, bleeding, inverted nipple, mass, nipple discharge, skin change or tenderness. Left: Normal. No swelling, bleeding, inverted nipple, mass, nipple discharge, skin change or tenderness. Abdominal:      General: There is no distension. Tenderness: There is no abdominal tenderness. There is no guarding. Genitourinary:     Exam position: Lithotomy position. Pubic Area: No rash. Labia:         Right: No rash, tenderness or lesion. Left: No rash, tenderness or lesion. Urethra: No prolapse, urethral swelling or urethral lesion. Vagina: Normal. No vaginal discharge, erythema, tenderness, bleeding or lesions. Cervix: No cervical motion tenderness, discharge, friability or erythema. Uterus: Not enlarged, not tender and no uterine prolapse. Adnexa:         Right: No mass, tenderness or fullness. Left: No mass, tenderness or fullness. Lymphadenopathy:      Upper Body:      Right upper body: No axillary adenopathy. Left upper body: No axillary adenopathy. Lower Body: No right inguinal adenopathy. No left inguinal adenopathy. Neurological:      Mental Status: She is alert.              Victoria Lynn MD  [facility]  09/22/23 12:49 PM

## 2023-09-22 NOTE — PROGRESS NOTES
[facility] [address] [place] [address]    Endometrial biopsy    Date/Time: 9/22/2023 9:00 AM    Performed by: Fawn Eaton. Jeremy Elizabeth MD  Authorized by: Fawn Elizabeth MD  Universal Protocol:  Consent: Written consent obtained. Risks and benefits: risks, benefits and alternatives were discussed  Consent given by: patient  Time out: Immediately prior to procedure a "time out" was called to verify the correct patient, procedure, equipment, support staff and site/side marked as required. Timeout called at: 9/22/2023 12:51 PM.  Patient understanding: patient states understanding of the procedure being performed  Patient consent: the patient's understanding of the procedure matches consent given  Procedure consent: procedure consent matches procedure scheduled  Relevant documents: relevant documents present and verified  Test results: test results available and properly labeled  Site marked: the operative site was marked  Required items: required blood products, implants, devices, and special equipment available  Patient identity confirmed: verbally with patient      Indication:     Indications:  Other disorder of menstruation and other abnormal bleeding from female genital tract    Procedure:     Procedure: endometrial biopsy with Pipelle      A bivalve speculum was placed in the vagina: yes      Cervix cleaned and prepped: yes      The cervix was dilated: yes      Uterus sounded: yes      Uterus sound depth (cm):  7    Specimen collected: specimen collected and sent to pathology      Patient tolerated procedure well with no complications: yes    Findings:     Uterus size:  Non-gravid    Cervix: normal      Adnexa: normal

## 2023-09-26 ENCOUNTER — TELEPHONE (OUTPATIENT)
Dept: NEUROLOGY | Facility: CLINIC | Age: 48
End: 2023-09-26

## 2023-09-26 NOTE — TELEPHONE ENCOUNTER
Called and LVM for patient regarding confirmation for upcoming appt w/ Dr. Myah Rousseau. Provided patient w/ appt time, date, and location.

## 2023-09-27 NOTE — TELEPHONE ENCOUNTER
Patient called in stating she has to r/s appt w/ Dr. Santiago Andersen. Offered pt soonest avail for 10/16 @ 3:10PM. Pt accepted.

## 2023-09-28 PROCEDURE — 88305 TISSUE EXAM BY PATHOLOGIST: CPT | Performed by: PATHOLOGY

## 2023-10-06 ENCOUNTER — OCCMED (OUTPATIENT)
Dept: URGENT CARE | Facility: CLINIC | Age: 48
End: 2023-10-06
Payer: OTHER MISCELLANEOUS

## 2023-10-06 ENCOUNTER — TELEPHONE (OUTPATIENT)
Dept: OBGYN CLINIC | Facility: CLINIC | Age: 48
End: 2023-10-06

## 2023-10-06 DIAGNOSIS — S70.12XD HEMATOMA OF LEFT THIGH, SUBSEQUENT ENCOUNTER: Primary | ICD-10-CM

## 2023-10-06 PROCEDURE — 99213 OFFICE O/P EST LOW 20 MIN: CPT

## 2023-10-06 NOTE — TELEPHONE ENCOUNTER
Left message for patient to call office. Advise patient that Dr. Suzanne Menchaca is recommending scheduling a surgical consult to discuss results and next steps.       Per Dr. Suzanne Menchaca - schedule first available in [place]

## 2023-10-17 ENCOUNTER — APPOINTMENT (OUTPATIENT)
Dept: URGENT CARE | Facility: CLINIC | Age: 48
End: 2023-10-17
Payer: OTHER MISCELLANEOUS

## 2023-10-17 PROCEDURE — 99213 OFFICE O/P EST LOW 20 MIN: CPT

## 2023-10-24 ENCOUNTER — TELEMEDICINE (OUTPATIENT)
Dept: OBGYN CLINIC | Facility: CLINIC | Age: 48
End: 2023-10-24
Payer: COMMERCIAL

## 2023-10-24 DIAGNOSIS — N85.00 ENDOMETRIAL HYPERPLASIA WITHOUT ATYPIA: Primary | ICD-10-CM

## 2023-10-24 DIAGNOSIS — N84.0 ENDOMETRIAL POLYP: ICD-10-CM

## 2023-10-24 PROCEDURE — 99214 OFFICE O/P EST MOD 30 MIN: CPT | Performed by: STUDENT IN AN ORGANIZED HEALTH CARE EDUCATION/TRAINING PROGRAM

## 2023-10-24 NOTE — PROGRESS NOTES
[facility] [address] [place] [address]  Video Virtual Visit    Assessment/Plan:  Karis Mares is a 50 y.o. Adelaide Lighter who follows up to discuss EMB results consistent with endometrial hyperplasia without atypia arising in a polyp. Endometrial hyperplasia without atypia  - We discussed the diagnosis of endometrial hyperplasia without atypia arising in a polyp. We discussed the risk of progression to endometrial cancer is approximately 1-5%. - We discussed the management options:  (1) Proceed right to hysterectomy. If she chooses this I may send to [facility] for management given risk factors for endometrial cancer (obesity, mother with papillary serous uterine cancer) and they can send frozen at time of surgery and decide on whether lymph node sampling and oophorectomy is indicated. (2) Hysteroscopy D&C Polypectomy to ensure there is not a more severe diagnosis that the EMB did not sample. Can place Mirena IUD for hyperplasia treatment at the time of hysteroscopy as a bridge to hysterectomy with repeat EMB in 3-6 months. We discussed options for progestin therapy including megestrol acetate, medroxyprogesterin, or levonorgestrel IUD. We discussed that patient treated with lngIUD have rates of regression to normal endometrium of approximately 90 percent and that randomized trial data demonstrate the lngIUD is more effective than oral progestins.  - She desires to avoid hysterectomy at this time. She opts for option (2) Hysterosocpy D&C polypectomy Mirena IUD insertion. Schedule request sent. Diagnoses and all orders for this visit:    Endometrial hyperplasia without atypia    Endometrial polyp          Subjective:   Karis Mares is a 50 y.o. Adelaide Lighter female. CC: follow up    HPI: Juan Sanchez follows up to discuss endometrial biopsy results. ROS: Review of Systems   Constitutional:  Negative for chills and fever.    Respiratory:  Negative for cough and shortness of breath. Cardiovascular:  Negative for chest pain and leg swelling. Gastrointestinal:  Negative for abdominal pain, nausea and vomiting. Genitourinary:  Negative for dysuria, frequency and urgency. Neurological:  Negative for dizziness, light-headedness and headaches. PFSH: The following portions of the patient's history were reviewed and updated as appropriate: allergies, current medications, past family history, past medical history, obstetric history, gynecologic history, past social history, past surgical history and problem list.       Objective: There were no vitals taken for this visit. Physical Exam  Constitutional:       Appearance: Normal appearance. Cardiovascular:      Rate and Rhythm: Normal rate. Pulmonary:      Effort: Pulmonary effort is normal.   Neurological:      Mental Status: She is alert. Psychiatric:         Mood and Affect: Mood normal.         Behavior: Behavior normal.           VIRTUAL VISIT DISCLAIMER    The patient was identified by name and date of birth. Dillon Spence was informed that this is a telemedicine visit and that the visit is being conducted through the [employer]. She agrees to proceed. .  My office door was closed. No one else was in the room. She acknowledged consent and understanding of privacy and security of the video platform. The patient has agreed to participate and understands they can discontinue the visit at any time. Patient is aware this is a billable service. I spent 30 minutes directly with the patient during this visit    Dillon Spence acknowledges that she has consented to an online visit or consultation. She understands that the online visit is based solely on information provided by her, and that, in the absence of a face-to-face physical evaluation by the physician, the diagnosis she receives is both limited and provisional in terms of accuracy and completeness.  This is not intended to replace a full medical face-to-face evaluation by the physician. Sweta Carson understands and accepts these terms.     Jacqueline Centeno MD  [id number] [address] [facility]  10/24/2023 2:27 PM

## 2023-10-24 NOTE — ASSESSMENT & PLAN NOTE
- We discussed the diagnosis of endometrial hyperplasia without atypia arising in a polyp. We discussed the risk of progression to endometrial cancer is approximately 1-5%. - We discussed the management options:  (1) Proceed right to hysterectomy. If she chooses this I may send to [facility] for management given risk factors for endometrial cancer (obesity, mother with papillary serous uterine cancer) and they can send frozen at time of surgery and decide on whether lymph node sampling and oophorectomy is indicated. (2) Hysteroscopy D&C Polypectomy to ensure there is not a more severe diagnosis that the EMB did not sample. Can place Mirena IUD for hyperplasia treatment at the time of hysteroscopy as a bridge to hysterectomy with repeat EMB in 3-6 months. We discussed options for progestin therapy including megestrol acetate, medroxyprogesterin, or levonorgestrel IUD. We discussed that patient treated with lngIUD have rates of regression to normal endometrium of approximately 90 percent and that randomized trial data demonstrate the lngIUD is more effective than oral progestins.  - She desires to avoid hysterectomy at this time. She opts for option (2) Hysterosocpy D&C polypectomy Mirena IUD insertion. Schedule request sent.

## 2023-10-25 ENCOUNTER — PATIENT MESSAGE (OUTPATIENT)
Dept: OBGYN CLINIC | Facility: CLINIC | Age: 48
End: 2023-10-25

## 2023-10-25 ENCOUNTER — TELEPHONE (OUTPATIENT)
Dept: HEMATOLOGY ONCOLOGY | Facility: CLINIC | Age: 48
End: 2023-10-25

## 2023-10-25 DIAGNOSIS — N85.00 ENDOMETRIAL HYPERPLASIA WITHOUT ATYPIA: Primary | ICD-10-CM

## 2023-10-25 NOTE — TELEPHONE ENCOUNTER
Patient calling to schedule a new patient appt. She not sure if it will be considered a second opinion. She will have referral faxed over.  I gave hopeline fax number

## 2023-10-26 ENCOUNTER — TELEPHONE (OUTPATIENT)
Dept: HEMATOLOGY ONCOLOGY | Facility: CLINIC | Age: 48
End: 2023-10-26

## 2023-10-26 NOTE — TELEPHONE ENCOUNTER
I called Chiquis Freire in response to a referral that was received for patient to establish care with [facility]. Outreach was made to complete patient's intake questionnaire . Patient's intake questionnaire was reviewed and complete. Patient's intake has been sent to the team for clinical review.

## 2023-10-30 DIAGNOSIS — R10.12 LEFT UPPER QUADRANT ABDOMINAL PAIN: ICD-10-CM

## 2023-10-30 DIAGNOSIS — I10 ESSENTIAL HYPERTENSION: ICD-10-CM

## 2023-10-30 DIAGNOSIS — Z98.84 HISTORY OF ROUX-EN-Y GASTRIC BYPASS: ICD-10-CM

## 2023-10-30 DIAGNOSIS — K21.9 GASTROESOPHAGEAL REFLUX DISEASE, UNSPECIFIED WHETHER ESOPHAGITIS PRESENT: ICD-10-CM

## 2023-10-30 DIAGNOSIS — M54.50 CHRONIC LOW BACK PAIN, UNSPECIFIED BACK PAIN LATERALITY, UNSPECIFIED WHETHER SCIATICA PRESENT: ICD-10-CM

## 2023-10-30 DIAGNOSIS — G89.29 CHRONIC LOW BACK PAIN, UNSPECIFIED BACK PAIN LATERALITY, UNSPECIFIED WHETHER SCIATICA PRESENT: ICD-10-CM

## 2023-10-30 RX ORDER — DULOXETIN HYDROCHLORIDE 60 MG/1
120 CAPSULE, DELAYED RELEASE ORAL DAILY
Qty: 180 CAPSULE | Refills: 2 | Status: SHIPPED | OUTPATIENT
Start: 2023-10-30

## 2023-10-30 RX ORDER — FAMOTIDINE 40 MG/1
40 TABLET, FILM COATED ORAL
Qty: 90 TABLET | Refills: 2 | Status: SHIPPED | OUTPATIENT
Start: 2023-10-30

## 2023-10-30 RX ORDER — METOPROLOL SUCCINATE 50 MG/1
50 TABLET, EXTENDED RELEASE ORAL DAILY
Qty: 90 TABLET | Refills: 2 | Status: SHIPPED | OUTPATIENT
Start: 2023-10-30

## 2023-10-30 NOTE — TELEPHONE ENCOUNTER
----- Message from Fredia Sacks sent at 10/30/2023  9:27 AM EDT -----  Regarding: Medication refills   Contact: [phone]  Good morning,    Can you call in 3 month supply for   Esomeprazole 20mg daily  duloxetine 120mg daily, famotidine 40 mg daily, metoprolol 50 mg daily.     Thanks,    Baldomero Hammond

## 2023-11-10 DIAGNOSIS — N76.0 ACUTE VAGINITIS: Primary | ICD-10-CM

## 2023-11-10 RX ORDER — METRONIDAZOLE 500 MG/1
500 TABLET ORAL EVERY 12 HOURS SCHEDULED
Qty: 14 TABLET | Refills: 0 | Status: SHIPPED | OUTPATIENT
Start: 2023-11-10 | End: 2023-11-17

## 2023-11-11 ENCOUNTER — OCCMED (OUTPATIENT)
Dept: URGENT CARE | Facility: CLINIC | Age: 48
End: 2023-11-11
Payer: OTHER MISCELLANEOUS

## 2023-11-11 DIAGNOSIS — S70.12XD HEMATOMA OF LEFT THIGH, SUBSEQUENT ENCOUNTER: Primary | ICD-10-CM

## 2023-11-11 PROCEDURE — 99213 OFFICE O/P EST LOW 20 MIN: CPT

## 2023-11-14 ENCOUNTER — TELEPHONE (OUTPATIENT)
Dept: HEMATOLOGY ONCOLOGY | Facility: CLINIC | Age: 48
End: 2023-11-14

## 2023-11-14 NOTE — TELEPHONE ENCOUNTER
Appointment Confirmation   Who are you speaking with? Patient   If it is not the patient, are they listed on an active communication consent form? N/A   Which provider is the appointment scheduled with? Dr. Amy Merino   When is the appointment scheduled? Please list date and time 11/17/23 @ 1pm   At which location is the [facility] scheduled to take place[facility]   Did caller verbalize understanding of appointment details?  Yes

## 2023-11-16 NOTE — PROGRESS NOTES
Assessment/Plan:    Problem List Items Addressed This Visit       Endometrial hyperplasia without atypia - Primary     48yo withHTN, obesity wit hh/o elin-en-y and new diagnosis of endometrial hyperplaisa without atypia. D/w pt the diagnosis of endometrial hyperplasia with and without atypia with atypical hyperplasia being more likely to progress to cancer (~8% if simple and ~29% if complex). We discussed that hte standard of care for nonatypical hyperplasia is conservative management  with hormonal therapy with interval EMB every 3 months until resolution or progression with consideration of spacing out after negative result. We also discussed definitive management with hysterectomy in setting of AUB however she has had regular bleeding and is not bothered by this but rather just increased number of heavy days. Given this, we discussed hysterectomy if bleeding changes or hyperplasia persists   We discussed that this type of abnormality is not associated with what her mother has (serous uterine cancer) and unlikely to be genetically related. Pt had D+C / IUD placement scheduled which I agree with. She will reach out should there be any other features noted on final pathology. Would recommend repeat EMB 3 months there after and if negative q6 months after or with changes in bleeding. I have spent a total time of 50 minutes on 11/17/23 in caring for this patient including Diagnostic results, Prognosis, Risks and benefits of tx options, Impressions, and Reviewing / ordering tests, medicine, procedures  . CHIEF COMPLAINT: hyperplasia    Oncology Problem:   Cancer Staging   No matching staging information was found for the patient. Previous therapy:  Oncology History    No history exists. Most recent imaging:  Mammo screening bilateral w 3d & cad  Narrative: DIAGNOSIS: Screening mammogram for breast cancer     TECHNIQUE:  Digital screening mammography was performed.  Computer Aided Detection   (CAD) analyzed all applicable images. COMPARISONS: Prior breast imaging dated: 01/15/2019    RELEVANT HISTORY:   Family Breast Cancer History: History of breast cancer in Paternal   Grandmother, Paternal Aunt. Family Medical History: Family medical history includes breast cancer in 2   relatives (paternal aunt, paternal grandmother), breast cancer additional   onset in paternal grandmother, and endometrial cancer in mother. Personal History: No known relevant hormone history. No known relevant   surgical history. No known relevant medical history. The patient is scheduled in a reminder system for screening mammography. 8-10% of cancers will be missed on mammography. Management of a palpable   abnormality must be based on clinical grounds. Patients will be notified   of their results via letter from our facility. Accredited by [employer] and [employer]. RISK ASSESSMENT:   5 Year Tyrer-Cuzick: 1.85 %  10 Year Tyrer-Cuzick: 3.98 %  Lifetime Tyrer-Cuzick: 18.96 %    TISSUE DENSITY:   The breasts are almost entirely fatty. INDICATION: Pito Will is a 52 y.o. female presenting for screening   mammography. FINDINGS:   There are no suspicious masses, grouped microcalcifications or areas of   architectural distortion. The skin and nipple areolar complex are   unremarkable. Impression: No mammographic evidence of malignancy. No significant change    ASSESSMENT/BI-RADS CATEGORY:  Left: 1 - Negative  Right: 1 - Negative  Overall: 1 - Negative    RECOMMENDATION:       - Routine screening mammogram in 1 year for both breasts. Workstation ID: [id number]        Patient ID: Pito Will is a 50 y.o. female  48yo withHTN, obesity wit hh/o elin-en-y and new diagnosis of endometrial hyperplaisa without atypia. Pt went for annual gyn exam and reported heavy menses with intermittent cramping. Her mother with a h/o serous uterine cancer (a patient of mine).  She did report pelvic pressure back in June for which an ultrasound was ordered and showed small uterus with 13mm EMS. EMB showed endometrial hyperplasia without atypia within a polyp. She was counseled on conservative management with hormones/IUD vs hysterectomy. She opted for conservative management with D+C and IUD placement but presents today for second opinion. Review of Systems   Constitutional: Negative. HENT: Negative. Eyes: Negative. Respiratory: Negative. Cardiovascular: Negative. Gastrointestinal: Negative. Endocrine: Negative. Genitourinary: Negative. Musculoskeletal: Negative. Neurological: Negative. Psychiatric/Behavioral:  The patient is nervous/anxious. Current Outpatient Medications   Medication Sig Dispense Refill    acetaminophen (TYLENOL) 500 mg tablet Take 500 mg by mouth every 6 (six) hours as needed for mild pain      ascorbic acid (VITAMIN C) 250 mg tablet Take 1 tablet (250 mg total) by mouth daily With FeSO4 90 tablet 1    azelastine (ASTELIN) 0.1 % nasal spray 2 sprays into each nostril 2 (two) times a day Use in each nostril as directed 30 mL 1    cetirizine (ZyrTEC) 10 mg tablet Take 10 mg by mouth daily      cyanocobalamin 1,000 mcg/mL Inject 1mL IM weekly for 4 weeks, then 1mL IM monthly.  10 mL 1    dicyclomine (BENTYL) 10 mg capsule Take 1 capsule (10 mg total) by mouth 4 (four) times a day as needed (abdominal pain) 120 capsule 5    DULoxetine (CYMBALTA) 60 mg delayed release capsule Take 2 capsules (120 mg total) by mouth daily 180 capsule 2    esomeprazole (NexIUM) 20 mg capsule Take 1 capsule (20 mg total) by mouth daily in the early morning 90 capsule 2    famotidine (PEPCID) 40 MG tablet Take 1 tablet (40 mg total) by mouth daily at bedtime as needed for heartburn 90 tablet 2    ferrous sulfate 324 (65 Fe) mg Take 2-3 tabs every other day with orange juice or vitamin C 90 tablet 1    folic acid (FOLVITE) 993 mcg tablet Take 1 tablet (400 mcg total) by mouth daily 90 tablet 0    metoprolol succinate (TOPROL-XL) 50 mg 24 hr tablet Take 1 tablet (50 mg total) by mouth daily 90 tablet 2    metroNIDAZOLE (FLAGYL) 500 mg tablet Take 1 tablet (500 mg total) by mouth every 12 (twelve) hours for 7 days 14 tablet 0    mupirocin (BACTROBAN) 2 % ointment Apply topically 3 (three) times a day (Patient taking differently: Apply topically 3 (three) times a day as needed fingers) 30 g 3    ondansetron (ZOFRAN) 8 mg tablet Take 1 tablet (8 mg total) by mouth every 8 (eight) hours as needed for nausea or vomiting 20 tablet 3    ergocalciferol (VITAMIN D2) 50,000 units Take 1 capsule (50,000 Units total) by mouth once a week for 12 doses 12 capsule 1    nystatin (MYCOSTATIN) powder Apply topically 2 (two) times a day (Patient taking differently: Apply topically 2 (two) times a day as needed) 60 g 1     No current facility-administered medications for this visit. Allergies   Allergen Reactions    Bee Pollen Swelling    Pollen Extract Swelling       Past Medical History:   Diagnosis Date    Anemia     Chronic back pain     secondary to spinal stenosis    Environmental allergies     GERD (gastroesophageal reflux disease)     Hypertension     Migraine     Obesity 1975    Spinal stenosis        Past Surgical History:   Procedure Laterality Date    BARIATRIC SURGERY      CHOLECYSTECTOMY      COLONOSCOPY      GASTRIC BYPASS  2011    MASS EXCISION Left 2023    Procedure: EXCISION BIOPSY TISSUE LESION/MASS LOWER EXTREMITY/thigh;  Surgeon: Dolores Lopez MD;  Location:  MAIN OR;  Service: General       OB History          1    Para   0    Term                AB   1    Living   0         SAB   1    IAB        Ectopic        Multiple        Live Births   0           Obstetric Comments   Age 12 - menarche  History of Birth Control - Pill. Blood pressure went up and never took it again.                 Family History   Problem Relation Age of Onset Diabetes Mother     Hypertension Mother     Endometrial cancer Mother 76    Cancer Mother         Endometrial    Diabetes Father     Hypertension Father     Heart disease Father     Prostate cancer Father     Cancer Father         Prostate    Hypertension Sister     No Known Problems Sister     Hypertension Maternal Grandmother     Heart disease Maternal Grandmother     Diabetes Maternal Grandmother     No Known Problems Maternal Grandfather     Breast cancer additional onset Paternal Grandmother 79    Breast cancer Paternal Grandmother 77    Cancer Paternal Grandmother         Laryngeal and breast    Prostate cancer Paternal Grandfather     Bone cancer Paternal Grandfather     Cancer Paternal Grandfather         Prostate    Breast cancer Paternal Aunt 76       The following portions of the patient's history were reviewed and updated as appropriate: allergies, current medications, past family history, past medical history, past social history, past surgical history, and problem list.      Objective:    Blood pressure 120/86, pulse (!) 106, height 5' 7" (1.702 m), weight 117 kg (257 lb), SpO2 98 %, not currently breastfeeding. Body mass index is 40.25 kg/m². Physical Exam  HENT:      Head: Normocephalic and atraumatic. Nose: Nose normal.   Cardiovascular:      Rate and Rhythm: Normal rate and regular rhythm. Pulmonary:      Effort: Pulmonary effort is normal.   Abdominal:      General: There is no distension. Palpations: Abdomen is soft. There is no mass. Genitourinary:     Comments: defer  Musculoskeletal:         General: No swelling. Normal range of motion. Cervical back: Normal range of motion. Skin:     General: Skin is warm and dry. Neurological:      General: No focal deficit present. Mental Status: She is alert.    Psychiatric:         Mood and Affect: Mood normal.           No results found for: ""  Lab Results   Component Value Date    WBC 8.35 07/27/2023    HGB 12.7 07/27/2023    HCT 39.7 07/27/2023    MCV 83 07/27/2023     07/27/2023     Lab Results   Component Value Date     11/24/2015    K 3.8 07/27/2023     07/27/2023    CO2 28 07/27/2023    ANIONGAP 10 11/24/2015    BUN 9 07/27/2023    CREATININE 0.64 07/27/2023    GLUCOSE 96 11/24/2015    GLUF 117 (H) 07/27/2023    CALCIUM 9.0 07/27/2023    AST 16 07/27/2023    ALT 23 07/27/2023    ALKPHOS 85 07/27/2023    PROT 7.5 11/24/2015    BILITOT 0.62 11/24/2015    EGFR 106 07/27/2023        Trend:  No results found for: ""

## 2023-11-16 NOTE — ASSESSMENT & PLAN NOTE
48yo withHTN, obesity wit hh/o elin-en-y and new diagnosis of endometrial hyperplaisa without atypia. D/w pt the diagnosis of endometrial hyperplasia with and without atypia with atypical hyperplasia being more likely to progress to cancer (~8% if simple and ~29% if complex). We discussed that hte standard of care for nonatypical hyperplasia is conservative management  with hormonal therapy with interval EMB every 3 months until resolution or progression with consideration of spacing out after negative result. We also discussed definitive management with hysterectomy in setting of AUB however she has had regular bleeding and is not bothered by this but rather just increased number of heavy days. Given this, we discussed hysterectomy if bleeding changes or hyperplasia persists   We discussed that this type of abnormality is not associated with what her mother has (serous uterine cancer) and unlikely to be genetically related. Pt had D+C / IUD placement scheduled which I agree with. She will reach out should there be any other features noted on final pathology. Would recommend repeat EMB 3 months there after and if negative q6 months after or with changes in bleeding.

## 2023-11-17 ENCOUNTER — OFFICE VISIT (OUTPATIENT)
Dept: GYNECOLOGIC ONCOLOGY | Facility: CLINIC | Age: 48
End: 2023-11-17
Payer: COMMERCIAL

## 2023-11-17 VITALS
HEIGHT: 67 IN | BODY MASS INDEX: 40.34 KG/M2 | OXYGEN SATURATION: 98 % | SYSTOLIC BLOOD PRESSURE: 120 MMHG | DIASTOLIC BLOOD PRESSURE: 86 MMHG | HEART RATE: 106 BPM | WEIGHT: 257 LBS

## 2023-11-17 DIAGNOSIS — N85.00 ENDOMETRIAL HYPERPLASIA WITHOUT ATYPIA: Primary | ICD-10-CM

## 2023-11-17 PROCEDURE — 99244 OFF/OP CNSLTJ NEW/EST MOD 40: CPT | Performed by: OBSTETRICS & GYNECOLOGY

## 2023-11-30 ENCOUNTER — TELEPHONE (OUTPATIENT)
Dept: OBGYN CLINIC | Facility: MEDICAL CENTER | Age: 48
End: 2023-11-30

## 2023-12-04 ENCOUNTER — OFFICE VISIT (OUTPATIENT)
Dept: FAMILY MEDICINE CLINIC | Facility: CLINIC | Age: 48
End: 2023-12-04
Payer: COMMERCIAL

## 2023-12-04 ENCOUNTER — PREP FOR PROCEDURE (OUTPATIENT)
Dept: GASTROENTEROLOGY | Facility: CLINIC | Age: 48
End: 2023-12-04

## 2023-12-04 ENCOUNTER — TELEPHONE (OUTPATIENT)
Dept: GASTROENTEROLOGY | Facility: CLINIC | Age: 48
End: 2023-12-04

## 2023-12-04 VITALS
HEART RATE: 91 BPM | OXYGEN SATURATION: 99 % | TEMPERATURE: 99.2 F | WEIGHT: 255.4 LBS | SYSTOLIC BLOOD PRESSURE: 116 MMHG | RESPIRATION RATE: 16 BRPM | HEIGHT: 67 IN | DIASTOLIC BLOOD PRESSURE: 68 MMHG | BODY MASS INDEX: 40.09 KG/M2

## 2023-12-04 DIAGNOSIS — E53.8 B12 DEFICIENCY: ICD-10-CM

## 2023-12-04 DIAGNOSIS — Z12.11 SCREENING FOR COLON CANCER: Primary | ICD-10-CM

## 2023-12-04 DIAGNOSIS — I10 ESSENTIAL HYPERTENSION: ICD-10-CM

## 2023-12-04 DIAGNOSIS — M25.511 CHRONIC PAIN OF BOTH SHOULDERS: ICD-10-CM

## 2023-12-04 DIAGNOSIS — M25.50 POLYARTHRALGIA: ICD-10-CM

## 2023-12-04 DIAGNOSIS — R73.03 PREDIABETES: ICD-10-CM

## 2023-12-04 DIAGNOSIS — M25.512 CHRONIC PAIN OF BOTH SHOULDERS: ICD-10-CM

## 2023-12-04 DIAGNOSIS — G89.29 CHRONIC PAIN OF BOTH SHOULDERS: ICD-10-CM

## 2023-12-04 DIAGNOSIS — E55.9 VITAMIN D DEFICIENCY: ICD-10-CM

## 2023-12-04 DIAGNOSIS — Z12.11 SCREENING FOR COLON CANCER: ICD-10-CM

## 2023-12-04 DIAGNOSIS — Z00.00 ANNUAL PHYSICAL EXAM: Primary | ICD-10-CM

## 2023-12-04 PROBLEM — R22.42 MASS OF LEFT THIGH: Status: RESOLVED | Noted: 2023-08-15 | Resolved: 2023-12-04

## 2023-12-04 PROBLEM — D50.8 IRON DEFICIENCY ANEMIA SECONDARY TO INADEQUATE DIETARY IRON INTAKE: Status: RESOLVED | Noted: 2020-07-27 | Resolved: 2023-12-04

## 2023-12-04 PROCEDURE — 99396 PREV VISIT EST AGE 40-64: CPT | Performed by: INTERNAL MEDICINE

## 2023-12-04 PROCEDURE — 99214 OFFICE O/P EST MOD 30 MIN: CPT | Performed by: INTERNAL MEDICINE

## 2023-12-04 PROCEDURE — 96372 THER/PROPH/DIAG INJ SC/IM: CPT

## 2023-12-04 RX ORDER — CYANOCOBALAMIN 1000 UG/ML
1000 INJECTION, SOLUTION INTRAMUSCULAR; SUBCUTANEOUS
Status: SHIPPED | OUTPATIENT
Start: 2023-12-04

## 2023-12-04 RX ADMIN — CYANOCOBALAMIN 1000 MCG: 1000 INJECTION, SOLUTION INTRAMUSCULAR; SUBCUTANEOUS at 15:56

## 2023-12-04 NOTE — PROGRESS NOTES
ADULT ANNUAL [id number]    NAME: Srikanth Castro  AGE: 50 y.o. SEX: female  : 1975     DATE: 2023     Assessment and Plan:     Problem List Items Addressed This Visit     Essential hypertension    Relevant Orders    CBC and differential    Comprehensive metabolic panel    Lipid Panel with Direct LDL reflex    Prediabetes    Relevant Orders    HEMOGLOBIN A1C W/ EAG ESTIMATION    Lipid Panel with Direct LDL reflex    Vitamin D deficiency    Relevant Orders    Vitamin D 25 hydroxy    B12 deficiency    Relevant Medications    cyanocobalamin injection 1,000 mcg    Other Relevant Orders    Vitamin B12   Other Visit Diagnoses     Annual physical exam    -  Primary    Screening for colon cancer        Polyarthralgia        Relevant Orders    XR shoulder 2+ vw left    XR shoulder 2+ vw right    RF Screen w/ Reflex to Titer    C-reactive protein    Sedimentation rate, automated    Cyclic citrul peptide antibody, IgG    Chronic pain of both shoulders        Relevant Orders    XR shoulder 2+ vw left    XR shoulder 2+ vw right          Immunizations and preventive care screenings were discussed with patient today. Appropriate education was printed on patient's after visit summary. Counseling:  Alcohol/drug use: discussed moderation in alcohol intake, the recommendations for healthy alcohol use, and avoidance of illicit drug use. Dental Health: discussed importance of regular tooth brushing, flossing, and dental visits. Injury prevention: discussed safety/seat belts, safety helmets, smoke detectors, carbon dioxide detectors, and smoking near bedding or upholstery. Sexual health: discussed sexually transmitted diseases, partner selection, use of condoms, avoidance of unintended pregnancy, and contraceptive alternatives. Exercise: the importance of regular exercise/physical activity was discussed.  Recommend exercise 3-5 times per week for at least 30 minutes. BMI Counseling: Body mass index is 40 kg/m². The BMI is above normal. Nutrition recommendations include decreasing portion sizes, encouraging healthy choices of fruits and vegetables and moderation in carbohydrate intake. Exercise recommendations include exercising 3-5 times per week. Rationale for BMI follow-up plan is due to patient being overweight or obese. Depression Screening and Follow-up Plan: Patient was screened for depression during today's encounter. They screened negative with a PHQ-2 score of 0. Return in about 6 months (around 6/4/2024) for Recheck. Chief Complaint:     Chief Complaint   Patient presents with   • Follow-up     Having pain in both shoulders      History of Present Illness:     Adult Annual Physical   Patient here for a comprehensive physical exam. The patient reports problems - still having issues with left thigh pain and discoloration from dog bite in July. Had evacuation of hematoma  in September. Reports a lot of stress. Switched back to dialysis and left home care, was out of work for 3 months due to leg injury. Recently sold her home and now moving in with parents. Hasn't been giving herself B12 regularly. Pharmacy gave incorrect syringes. Diet and Physical Activity  Diet/Nutrition: well balanced diet. Exercise: no formal exercise. Depression Screening  PHQ-2/9 Depression Screening    Little interest or pleasure in doing things: 0 - not at all  Feeling down, depressed, or hopeless: 0 - not at all  PHQ-2 Score: 0  PHQ-2 Interpretation: Negative depression screen       General Health  Sleep: sleeps poorly. Hearing: normal - bilateral.  Vision: goes for regular eye exams. Dental: no dental visits for >1 year. /GYN Health  Follows with gynecology? yes   Patient is: premenopausal  Last menstrual period: just finished  Contraceptive method: IUD placement.     Advanced Care Planning  Do you have an advanced directive? no  Do you have a durable medical power of ? no     Review of Systems:     Review of Systems   Constitutional:  Positive for fatigue. Negative for chills and fever. Respiratory:  Negative for shortness of breath. Genitourinary:  Positive for menstrual problem and pelvic pain. Musculoskeletal:  Positive for arthralgias (shoulders) and myalgias (leg pain from dog bite in July). Psychiatric/Behavioral:  Positive for sleep disturbance.        Past Medical History:     Past Medical History:   Diagnosis Date   • Anemia 2011   • Chronic back pain     secondary to spinal stenosis   • Environmental allergies    • GERD (gastroesophageal reflux disease)    • Hypertension    • Migraine    • Obesity 1975   • Spinal stenosis       Past Surgical History:     Past Surgical History:   Procedure Laterality Date   • BARIATRIC SURGERY  2011   • CHOLECYSTECTOMY     • COLONOSCOPY     • GASTRIC BYPASS  2011   • MASS EXCISION Left 9/1/2023    Procedure: EXCISION BIOPSY TISSUE LESION/MASS LOWER EXTREMITY/thigh;  Surgeon: James Maldonado MD;  Location: [facility];  Service: General      Social History:     Social History     Socioeconomic History   • Marital status: Single     Spouse name: None   • Number of children: None   • Years of education: None   • Highest education level: None   Occupational History   • None   Tobacco Use   • Smoking status: Never   • Smokeless tobacco: Never   Vaping Use   • Vaping Use: Never used   Substance and Sexual Activity   • Alcohol use: No   • Drug use: No   • Sexual activity: Not Currently     Birth control/protection: Abstinence   Other Topics Concern   • None   Social History Narrative   • None     Social Determinants of Health     Financial Resource Strain: Not on file   Food Insecurity: Not on file   Transportation Needs: Not on file   Physical Activity: Not on file   Stress: Not on file   Social Connections: Not on file   Intimate Partner Violence: Not on file   Housing Stability: Not on file      Family History:     Family History   Problem Relation Age of Onset   • Diabetes Mother    • Hypertension Mother    • Endometrial cancer Mother 76   • Cancer Mother         Endometrial   • Diabetes Father    • Hypertension Father    • Heart disease Father    • Prostate cancer Father    • Cancer Father         Prostate   • Hypertension Sister    • No Known Problems Sister    • Hypertension Maternal Grandmother    • Heart disease Maternal Grandmother    • Diabetes Maternal Grandmother    • No Known Problems Maternal Grandfather    • Breast cancer additional onset Paternal Grandmother 79   • Breast cancer Paternal Grandmother 77   • Cancer Paternal Grandmother         Laryngeal and breast   • Prostate cancer Paternal Grandfather    • Bone cancer Paternal Grandfather    • Cancer Paternal Grandfather         Prostate   • Breast cancer Paternal Aunt 76      Current Medications:     Current Outpatient Medications   Medication Sig Dispense Refill   • acetaminophen (TYLENOL) 500 mg tablet Take 500 mg by mouth every 6 (six) hours as needed for mild pain     • ascorbic acid (VITAMIN C) 250 mg tablet Take 1 tablet (250 mg total) by mouth daily With FeSO4 90 tablet 1   • azelastine (ASTELIN) 0.1 % nasal spray 2 sprays into each nostril 2 (two) times a day Use in each nostril as directed 30 mL 1   • cetirizine (ZyrTEC) 10 mg tablet Take 10 mg by mouth daily     • dicyclomine (BENTYL) 10 mg capsule Take 1 capsule (10 mg total) by mouth 4 (four) times a day as needed (abdominal pain) 120 capsule 5   • DULoxetine (CYMBALTA) 60 mg delayed release capsule Take 2 capsules (120 mg total) by mouth daily 180 capsule 2   • ergocalciferol (VITAMIN D2) 50,000 units Take 1 capsule (50,000 Units total) by mouth once a week for 12 doses 12 capsule 1   • esomeprazole (NexIUM) 20 mg capsule Take 1 capsule (20 mg total) by mouth daily in the early morning 90 capsule 2   • famotidine (PEPCID) 40 MG tablet Take 1 tablet (40 mg total) by mouth daily at bedtime as needed for heartburn 90 tablet 2   • ferrous sulfate 324 (65 Fe) mg Take 2-3 tabs every other day with orange juice or vitamin C 90 tablet 1   • folic acid (FOLVITE) 028 mcg tablet Take 1 tablet (400 mcg total) by mouth daily 90 tablet 0   • metoprolol succinate (TOPROL-XL) 50 mg 24 hr tablet Take 1 tablet (50 mg total) by mouth daily 90 tablet 2   • mupirocin (BACTROBAN) 2 % ointment Apply topically 3 (three) times a day (Patient taking differently: Apply topically 3 (three) times a day as needed fingers) 30 g 3   • ondansetron (ZOFRAN) 8 mg tablet Take 1 tablet (8 mg total) by mouth every 8 (eight) hours as needed for nausea or vomiting 20 tablet 3   • nystatin (MYCOSTATIN) powder Apply topically 2 (two) times a day (Patient taking differently: Apply topically 2 (two) times a day as needed) 60 g 1   • polyethylene glycol (GOLYTELY) 4000 mL solution Take 4,000 mL by mouth once for 1 dose 4000 mL 0     Current Facility-Administered Medications   Medication Dose Route Frequency Provider Last Rate Last Admin   • cyanocobalamin injection 1,000 mcg  1,000 mcg Intramuscular Q30 Days Jose Alfredo Vanessa MD   1,000 mcg at 12/04/23 7373      Allergies: Allergies   Allergen Reactions   • Bee Pollen Swelling   • Pollen Extract Swelling      Physical Exam:     /68   Pulse 91   Temp 99.2 °F (37.3 °C) (Tympanic)   Resp 16   Ht 5' 7" (1.702 m)   Wt 116 kg (255 lb 6.4 oz)   SpO2 99%   BMI 40.00 kg/m²     Physical Exam  Vitals reviewed. Constitutional:       General: She is not in acute distress. Appearance: She is obese. HENT:      Head: Normocephalic and atraumatic. Right Ear: Tympanic membrane, ear canal and external ear normal.      Left Ear: Tympanic membrane, ear canal and external ear normal.      Mouth/Throat:      Pharynx: No oropharyngeal exudate or posterior oropharyngeal erythema. Cardiovascular:      Rate and Rhythm: Normal rate and regular rhythm.       Heart sounds: No murmur heard. No friction rub. No gallop. Pulmonary:      Effort: Pulmonary effort is normal. No respiratory distress. Breath sounds: No wheezing, rhonchi or rales. Abdominal:      General: Abdomen is flat. Bowel sounds are normal. There is no distension. Palpations: Abdomen is soft. There is no mass. Tenderness: There is no abdominal tenderness. There is no guarding or rebound. Lymphadenopathy:      Cervical: No cervical adenopathy. Neurological:      General: No focal deficit present. Mental Status: She is alert. Psychiatric:         Mood and Affect: Mood normal.         Behavior: Behavior normal.         Thought Content:  Thought content normal.         Judgment: Judgment normal.          MD Oneida Sumner

## 2023-12-04 NOTE — TELEPHONE ENCOUNTER
Referring Provider Maeve Santamaria MD     Pre- Screening: There is no height or weight on file to calculate BMI. Has patient been referred for a routine screening Colonoscopy? yes  Is the patient between 43-73 years old? no       Previous Colonoscopy no  If yes:         Does the patient want to see a Gastroenterologist prior to their procedure OR are they having any GI symptoms? no     Has the patient been hospitalized or had abdominal surgery in the past 6 months? no     Does the patient use supplemental oxygen? no     Does the patient take Coumadin, Lovenox, Plavix, Elliquis, Xarelto, or other blood thinning medication? no     Has the patient had a stroke, cardiac event, or stent placed in the past year? no     Patient scheduled for OA at [facility] with Dr. Ashley Mcmullen on 1/4/24. Prep instructions reviewed in detail with patient. All questions answered.  [facility] sent to [facility]

## 2023-12-04 NOTE — PATIENT INSTRUCTIONS
Wellness Visit for Adults   AMBULATORY CARE:   A wellness visit  is when you see your healthcare provider to get screened for health problems. Your healthcare provider will also give you advice on how to stay healthy. Write down your questions so you remember to ask them. Ask your healthcare provider how often you should have a wellness visit. What happens at a wellness visit:  Your healthcare provider will ask about your health, and your family history of health problems. This includes high blood pressure, heart disease, and cancer. He or she will ask if you have symptoms that concern you, if you smoke, and about your mood. You may also be asked about your intake of medicines, supplements, food, and alcohol. Any of the following may be done: Your weight  will be checked. Your height may also be checked so your body mass index (BMI) can be calculated. Your BMI shows if you are at a healthy weight. Your blood pressure  and heart rate will be checked. Your temperature may also be checked. Blood and urine tests  may be done. Blood tests may be done to check your cholesterol levels. Abnormal cholesterol levels increase your risk for heart disease and stroke. You may also need a blood or urine test to check for diabetes if you are at increased risk. Urine tests may be done to look for signs of an infection or kidney disease. A physical exam  includes checking your heartbeat and lungs with a stethoscope. Your healthcare provider may also check your skin to look for sun damage. Screening tests  may be recommended. A screening test is done to check for diseases that may not cause symptoms. The screening tests you may need depend on your age, gender, family history, and lifestyle habits. For example, colorectal screening may be recommended if you are 48years old or older. Screening tests you need if you are a woman:   A Pap smear  is used to screen for cervical cancer.  Pap smears are usually done every 3 to 5 years depending on your age. You may need them more often if you have had abnormal Pap smear test results in the past. Ask your healthcare provider how often you should have a Pap smear. A mammogram  is an x-ray of your breasts to screen for breast cancer. Experts recommend mammograms every 2 years starting at age 48 years. You may need a mammogram at age 52 years or younger if you have an increased risk for breast cancer. Talk to your healthcare provider about when you should start having mammograms and how often you need them. Vaccines you may need:   Get an influenza vaccine  every year. The influenza vaccine protects you from the flu. Several types of viruses cause the flu. The viruses change over time, so new vaccines are made each year. Get a tetanus-diphtheria (Td) booster vaccine  every 10 years. This vaccine protects you against tetanus and diphtheria. Tetanus is a severe infection that may cause painful muscle spasms and lockjaw. Diphtheria is a severe bacterial infection that causes a thick covering in the back of your mouth and throat. Get a human papillomavirus (HPV) vaccine  if you are female and aged 23 to 32 or male 23 to 24 and never received it. This vaccine protects you from HPV infection. HPV is the most common infection spread by sexual contact. HPV may also cause vaginal, penile, and anal cancers. Get a pneumococcal vaccine  if you are aged 72 years or older. The pneumococcal vaccine is an injection given to protect you from pneumococcal disease. Pneumococcal disease is an infection caused by pneumococcal bacteria. The infection may cause pneumonia, meningitis, or an ear infection. Get a shingles vaccine  if you are 60 or older, even if you have had shingles before. The shingles vaccine is an injection to protect you from the varicella-zoster virus. This is the same virus that causes chickenpox.  Shingles is a painful rash that develops in people who had chickenpox or have been exposed to the virus. How to eat healthy:  My Plate is a model for planning healthy meals. It shows the types and amounts of foods that should go on your plate. Fruits and vegetables make up about half of your plate, and grains and protein make up the other half. A serving of dairy is included on the side of your plate. The amount of calories and serving sizes you need depends on your age, gender, weight, and height. Examples of healthy foods are listed below:  Eat a variety of vegetables  such as dark green, red, and orange vegetables. You can also include canned vegetables low in sodium (salt) and frozen vegetables without added butter or sauces. Eat a variety of fresh fruits , canned fruit in 100% juice, frozen fruit, and dried fruit. Include whole grains. At least half of the grains you eat should be whole grains. Examples include whole-wheat bread, wheat pasta, brown rice, and whole-grain cereals such as oatmeal.    Eat a variety of protein foods such as seafood (fish and shellfish), lean meat, and poultry without skin (turkey and chicken). Examples of lean meats include pork leg, shoulder, or tenderloin, and beef round, sirloin, tenderloin, and extra lean ground beef. Other protein foods include eggs and egg substitutes, beans, peas, soy products, nuts, and seeds. Choose low-fat dairy products such as skim or 1% milk or low-fat yogurt, cheese, and cottage cheese. Limit unhealthy fats  such as butter, hard margarine, and shortening. Exercise:  Exercise at least 30 minutes per day on most days of the week. Some examples of exercise include walking, biking, dancing, and swimming. You can also fit in more physical activity by taking the stairs instead of the elevator or parking farther away from stores. Include muscle strengthening activities 2 days each week. Regular exercise provides many health benefits.  It helps you manage your weight, and decreases your risk for type 2 diabetes, heart disease, stroke, and high blood pressure. Exercise can also help improve your mood. Ask your healthcare provider about the best exercise plan for you. General health and safety guidelines:   Do not smoke. Nicotine and other chemicals in cigarettes and cigars can cause lung damage. Ask your healthcare provider for information if you currently smoke and need help to quit. E-cigarettes or smokeless tobacco still contain nicotine. Talk to your healthcare provider before you use these products. Limit alcohol. A drink of alcohol is 12 ounces of beer, 5 ounces of wine, or 1½ ounces of liquor. Lose weight, if needed. Being overweight increases your risk of certain health conditions. These include heart disease, high blood pressure, type 2 diabetes, and certain types of cancer. Protect your skin. Do not sunbathe or use tanning beds. Use sunscreen with a SPF 15 or higher. Apply sunscreen at least 15 minutes before you go outside. Reapply sunscreen every 2 hours. Wear protective clothing, hats, and sunglasses when you are outside. Drive safely. Always wear your seatbelt. Make sure everyone in your car wears a seatbelt. A seatbelt can save your life if you are in an accident. Do not use your cell phone when you are driving. This could distract you and cause an accident. Pull over if you need to make a call or send a text message. Practice safe sex. Use latex condoms if are sexually active and have more than one partner. Your healthcare provider may recommend screening tests for sexually transmitted infections (STIs). Wear helmets, lifejackets, and protective gear. Always wear a helmet when you ride a bike or motorcycle, go skiing, or play sports that could cause a head injury. Wear protective equipment when you play sports. Wear a lifejacket when you are on a boat or doing water sports.     © Copyright [employer] 2023 Information is for End User's use only and may not be sold, redistributed or otherwise used for commercial purposes. The above information is an  only. It is not intended as medical advice for individual conditions or treatments. Talk to your doctor, nurse or pharmacist before following any medical regimen to see if it is safe and effective for you.

## 2023-12-05 ENCOUNTER — PATIENT MESSAGE (OUTPATIENT)
Dept: FAMILY MEDICINE CLINIC | Facility: CLINIC | Age: 48
End: 2023-12-05

## 2023-12-05 ENCOUNTER — TELEPHONE (OUTPATIENT)
Dept: OBGYN CLINIC | Facility: MEDICAL CENTER | Age: 48
End: 2023-12-05

## 2023-12-05 ENCOUNTER — TELEPHONE (OUTPATIENT)
Dept: GYNECOLOGY | Facility: CLINIC | Age: 48
End: 2023-12-05

## 2023-12-05 DIAGNOSIS — E66.01 CLASS 3 SEVERE OBESITY DUE TO EXCESS CALORIES WITH SERIOUS COMORBIDITY AND BODY MASS INDEX (BMI) OF 40.0 TO 44.9 IN ADULT (HCC): Primary | ICD-10-CM

## 2023-12-05 DIAGNOSIS — G47.33 OSA (OBSTRUCTIVE SLEEP APNEA): ICD-10-CM

## 2023-12-05 DIAGNOSIS — E11.9 TYPE 2 DIABETES MELLITUS WITHOUT COMPLICATION, WITHOUT LONG-TERM CURRENT USE OF INSULIN (HCC): ICD-10-CM

## 2023-12-05 NOTE — TELEPHONE ENCOUNTER
----- Message from Ely Weaver. [facility], MD sent at 11/21/2023  1:20 PM EST -----  Regarding: [place] Hysteroscopy D&C Polypectomy Mirena IUD insertion  Hysteroscopy D&C Polypectomy Mirena IUD insertion  45 min  [place] or [place]  Equipment- Symphion    Diagnosis- Endometrial hyperplasia without atypia    Marilyn Davalos, okay to double book on a day I'm there    Any way to get this done before January?

## 2023-12-11 ENCOUNTER — TELEPHONE (OUTPATIENT)
Dept: FAMILY MEDICINE CLINIC | Facility: CLINIC | Age: 48
End: 2023-12-11

## 2023-12-11 NOTE — TELEPHONE ENCOUNTER
PA initiated for [facility] on CoverMyMeds. Awaiting determination.     Key [id number]

## 2023-12-21 ENCOUNTER — PATIENT MESSAGE (OUTPATIENT)
Dept: FAMILY MEDICINE CLINIC | Facility: CLINIC | Age: 48
End: 2023-12-21

## 2023-12-21 DIAGNOSIS — J30.89 NON-SEASONAL ALLERGIC RHINITIS, UNSPECIFIED TRIGGER: Primary | ICD-10-CM

## 2023-12-22 RX ORDER — MONTELUKAST SODIUM 10 MG/1
10 TABLET ORAL
Qty: 90 TABLET | Refills: 2 | Status: SHIPPED | OUTPATIENT
Start: 2023-12-22

## 2023-12-28 ENCOUNTER — TELEPHONE (OUTPATIENT)
Age: 48
End: 2023-12-28

## 2024-01-02 ENCOUNTER — PATIENT MESSAGE (OUTPATIENT)
Dept: FAMILY MEDICINE CLINIC | Facility: CLINIC | Age: 49
End: 2024-01-02

## 2024-01-02 DIAGNOSIS — E66.01 CLASS 3 SEVERE OBESITY DUE TO EXCESS CALORIES WITH SERIOUS COMORBIDITY AND BODY MASS INDEX (BMI) OF 40.0 TO 44.9 IN ADULT (HCC): Primary | ICD-10-CM

## 2024-01-02 DIAGNOSIS — G47.33 OSA (OBSTRUCTIVE SLEEP APNEA): ICD-10-CM

## 2024-01-02 DIAGNOSIS — R73.03 PREDIABETES: ICD-10-CM

## 2024-01-02 DIAGNOSIS — I10 ESSENTIAL HYPERTENSION: ICD-10-CM

## 2024-01-03 ENCOUNTER — TELEPHONE (OUTPATIENT)
Dept: FAMILY MEDICINE CLINIC | Facility: CLINIC | Age: 49
End: 2024-01-03

## 2024-01-03 DIAGNOSIS — I10 ESSENTIAL HYPERTENSION: ICD-10-CM

## 2024-01-03 DIAGNOSIS — R73.03 PREDIABETES: ICD-10-CM

## 2024-01-03 DIAGNOSIS — G47.33 OSA (OBSTRUCTIVE SLEEP APNEA): ICD-10-CM

## 2024-01-03 DIAGNOSIS — E66.01 CLASS 3 SEVERE OBESITY DUE TO EXCESS CALORIES WITH SERIOUS COMORBIDITY AND BODY MASS INDEX (BMI) OF 40.0 TO 44.9 IN ADULT (HCC): ICD-10-CM

## 2024-01-03 NOTE — TELEPHONE ENCOUNTER
Pharmacy called stating [facility] needs PA.     Initiated on CoverMyMeds and Approved    Key [id number]    Pharamcy did report they are having a hard time getting medication in stock. It may be fixed soon, but cannot guarantee.     Left message for patient requesting call back to discuss.

## 2024-01-04 ENCOUNTER — PATIENT MESSAGE (OUTPATIENT)
Dept: FAMILY MEDICINE CLINIC | Facility: CLINIC | Age: 49
End: 2024-01-04

## 2024-01-04 DIAGNOSIS — R73.03 PREDIABETES: ICD-10-CM

## 2024-01-04 DIAGNOSIS — E66.01 CLASS 3 SEVERE OBESITY DUE TO EXCESS CALORIES WITH SERIOUS COMORBIDITY AND BODY MASS INDEX (BMI) OF 40.0 TO 44.9 IN ADULT (HCC): Primary | ICD-10-CM

## 2024-01-04 DIAGNOSIS — G47.33 OSA (OBSTRUCTIVE SLEEP APNEA): ICD-10-CM

## 2024-01-04 DIAGNOSIS — I10 ESSENTIAL HYPERTENSION: ICD-10-CM

## 2024-01-04 RX ORDER — TIRZEPATIDE 2.5 MG/.5ML
2.5 INJECTION, SOLUTION SUBCUTANEOUS WEEKLY
Qty: 2 ML | Refills: 0 | Status: SHIPPED | OUTPATIENT
Start: 2024-01-04 | End: 2024-02-01

## 2024-01-06 ENCOUNTER — PREP FOR PROCEDURE (OUTPATIENT)
Dept: GYNECOLOGY | Facility: CLINIC | Age: 49
End: 2024-01-06

## 2024-01-06 DIAGNOSIS — N84.0 ENDOMETRIAL POLYP: ICD-10-CM

## 2024-01-06 DIAGNOSIS — N85.00 ENDOMETRIAL HYPERPLASIA WITHOUT ATYPIA: Primary | ICD-10-CM

## 2024-01-07 LAB
25(OH)D3 SERPL-MCNC: 18 NG/ML (ref 30–100)
ALBUMIN SERPL-MCNC: 4 G/DL (ref 3.6–5.1)
ALBUMIN/GLOB SERPL: 1.8 (CALC) (ref 1–2.5)
ALP SERPL-CCNC: 83 U/L (ref 31–125)
ALT SERPL-CCNC: 15 U/L (ref 6–29)
AST SERPL-CCNC: 13 U/L (ref 10–35)
BASOPHILS # BLD AUTO: 29 CELLS/UL (ref 0–200)
BASOPHILS NFR BLD AUTO: 0.4 %
BILIRUB SERPL-MCNC: 0.4 MG/DL (ref 0.2–1.2)
BUN SERPL-MCNC: 11 MG/DL (ref 7–25)
BUN/CREAT SERPL: ABNORMAL (CALC) (ref 6–22)
CALCIUM SERPL-MCNC: 9 MG/DL (ref 8.6–10.2)
CHLORIDE SERPL-SCNC: 104 MMOL/L (ref 98–110)
CHOLEST SERPL-MCNC: 198 MG/DL
CHOLEST/HDLC SERPL: 2.8 (CALC)
CO2 SERPL-SCNC: 27 MMOL/L (ref 20–32)
CREAT SERPL-MCNC: 0.62 MG/DL (ref 0.5–0.99)
EOSINOPHIL # BLD AUTO: 295 CELLS/UL (ref 15–500)
EOSINOPHIL NFR BLD AUTO: 4.1 %
ERYTHROCYTE [DISTWIDTH] IN BLOOD BY AUTOMATED COUNT: 13.8 % (ref 11–15)
EST. AVERAGE GLUCOSE BLD GHB EST-MCNC: 126 MG/DL
EST. AVERAGE GLUCOSE BLD GHB EST-SCNC: 7 MMOL/L
GFR/BSA.PRED SERPLBLD CYS-BASED-ARV: 110 ML/MIN/1.73M2
GLOBULIN SER CALC-MCNC: 2.2 G/DL (CALC) (ref 1.9–3.7)
GLUCOSE SERPL-MCNC: 112 MG/DL (ref 65–99)
HBA1C MFR BLD: 6 % OF TOTAL HGB
HCT VFR BLD AUTO: 40.5 % (ref 35–45)
HDLC SERPL-MCNC: 71 MG/DL
HGB BLD-MCNC: 13.6 G/DL (ref 11.7–15.5)
LDLC SERPL CALC-MCNC: 106 MG/DL (CALC)
LYMPHOCYTES # BLD AUTO: 2657 CELLS/UL (ref 850–3900)
LYMPHOCYTES NFR BLD AUTO: 36.9 %
MCH RBC QN AUTO: 27.2 PG (ref 27–33)
MCHC RBC AUTO-ENTMCNC: 33.6 G/DL (ref 32–36)
MCV RBC AUTO: 81 FL (ref 80–100)
MONOCYTES # BLD AUTO: 461 CELLS/UL (ref 200–950)
MONOCYTES NFR BLD AUTO: 6.4 %
NEUTROPHILS # BLD AUTO: 3758 CELLS/UL (ref 1500–7800)
NEUTROPHILS NFR BLD AUTO: 52.2 %
NONHDLC SERPL-MCNC: 127 MG/DL (CALC)
PLATELET # BLD AUTO: 301 THOUSAND/UL (ref 140–400)
PMV BLD REES-ECKER: 10.6 FL (ref 7.5–12.5)
POTASSIUM SERPL-SCNC: 4.5 MMOL/L (ref 3.5–5.3)
PROT SERPL-MCNC: 6.2 G/DL (ref 6.1–8.1)
RBC # BLD AUTO: 5 MILLION/UL (ref 3.8–5.1)
SODIUM SERPL-SCNC: 140 MMOL/L (ref 135–146)
TRIGL SERPL-MCNC: 117 MG/DL
VIT B12 SERPL-MCNC: 380 PG/ML (ref 200–1100)
WBC # BLD AUTO: 7.2 THOUSAND/UL (ref 3.8–10.8)

## 2024-01-07 RX ORDER — SODIUM CHLORIDE, SODIUM LACTATE, POTASSIUM CHLORIDE, CALCIUM CHLORIDE 600; 310; 30; 20 MG/100ML; MG/100ML; MG/100ML; MG/100ML
125 INJECTION, SOLUTION INTRAVENOUS CONTINUOUS
Status: CANCELLED | OUTPATIENT
Start: 2024-01-18

## 2024-01-09 ENCOUNTER — TELEPHONE (OUTPATIENT)
Dept: FAMILY MEDICINE CLINIC | Facility: CLINIC | Age: 49
End: 2024-01-09

## 2024-01-09 DIAGNOSIS — E55.9 VITAMIN D DEFICIENCY: ICD-10-CM

## 2024-01-15 ENCOUNTER — CONSULT (OUTPATIENT)
Dept: OBGYN CLINIC | Facility: CLINIC | Age: 49
End: 2024-01-15

## 2024-01-15 VITALS
WEIGHT: 260 LBS | DIASTOLIC BLOOD PRESSURE: 68 MMHG | SYSTOLIC BLOOD PRESSURE: 118 MMHG | HEIGHT: 67 IN | BODY MASS INDEX: 40.81 KG/M2

## 2024-01-15 DIAGNOSIS — N85.00 ENDOMETRIAL HYPERPLASIA WITHOUT ATYPIA: Primary | ICD-10-CM

## 2024-01-15 PROCEDURE — PREOP: Performed by: STUDENT IN AN ORGANIZED HEALTH CARE EDUCATION/TRAINING PROGRAM

## 2024-01-16 ENCOUNTER — ANESTHESIA EVENT (OUTPATIENT)
Dept: PERIOP | Facility: HOSPITAL | Age: 49
End: 2024-01-16
Payer: COMMERCIAL

## 2024-01-16 NOTE — PRE-PROCEDURE INSTRUCTIONS
Pre-Surgery Instructions:   Medication Instructions    acetaminophen (TYLENOL) 500 mg tablet Uses PRN- OK to take day of surgery    ascorbic acid (VITAMIN C) 250 mg tablet Hold day of surgery.    azelastine (ASTELIN) 0.1 % nasal spray Uses PRN- OK to take day of surgery    cetirizine (ZyrTEC) 10 mg tablet Take night before surgery    dicyclomine (BENTYL) 10 mg capsule Uses PRN- OK to take day of surgery    DULoxetine (CYMBALTA) 60 mg delayed release capsule Take day of surgery.    esomeprazole (NexIUM) 20 mg capsule Take day of surgery.    famotidine (PEPCID) 40 MG tablet Take night before surgery    ferrous sulfate 324 (65 Fe) mg Hold day of surgery.    folic acid (FOLVITE) 400 mcg tablet Hold day of surgery.    metoprolol succinate (TOPROL-XL) 50 mg 24 hr tablet Take night before surgery    montelukast (SINGULAIR) 10 mg tablet Take night before surgery    ondansetron (ZOFRAN) 8 mg tablet Uses PRN- OK to take day of surgery    Medication instructions for day surgery reviewed. Please use only a sip of water to take your instructed medications. Avoid all over the counter vitamins, supplements and NSAIDS for one week prior to surgery per anesthesia guidelines. Tylenol is ok to take as needed.     You will receive a call one business day prior to surgery with an arrival time and hospital directions. If your surgery is scheduled on a Monday, the hospital will be calling you on the Friday prior to your surgery. If you have not heard from anyone by 8pm, please call the hospital supervisor through the hospital  at [phone]. ([place] [phone]).    Do not eat or drink anything after midnight the night before your surgery, including candy, mints, lifesavers, or chewing gum. Do not drink alcohol 24hrs before your surgery. Try not to smoke at least 24hrs before your surgery.       Follow the pre surgery showering instructions as listed in the “My Surgical Experience Booklet” or otherwise provided by your  surgeon's office. Do not use a blade to shave the surgical area 1 week before surgery. It is okay to use a clean electric clippers up to 24 hours before surgery. Do not apply any lotions, creams, including makeup, cologne, deodorant, or perfumes after showering on the day of your surgery. Do not use dry shampoo, hair spray, hair gel, or any type of hair products.     No contact lenses, eye make-up, or artificial eyelashes. Remove nail polish, including gel polish, and any artificial, gel, or acrylic nails if possible. Remove all jewelry including rings and body piercing jewelry.     Wear causal clothing that is easy to take on and off. Consider your type of surgery.    Keep any valuables, jewelry, piercings at home. Please bring any specially ordered equipment (sling, braces) if indicated.    Arrange for a responsible person to drive you to and from the hospital on the day of your surgery. Visitor Guidelines discussed.     Call the surgeon's office with any new illnesses, exposures, or additional questions prior to surgery.    Please reference your “My Surgical Experience Booklet” for additional information to prepare for your upcoming surgery.

## 2024-01-16 NOTE — PROGRESS NOTES
"[facility] [address] Marilyn Patel PA    Assessment/Plan:  Ting Vasques is a 48 y.o.  who presents for pre-operative H&P prior to planned hysteroscopy polypectomy Mirena IUD insertion for endometrial hyperplasia without atypia arising in a polyp.    Endometrial hyperplasia without atypia  - Ting's mother has a history of serous uterine cancer, and she was seen by Gyn Oncology for a second opinion regarding management of endometrial hyperplasia without atypia.  After this consultation, she opted to proceed with the option of hysteroscopy polypectomy D&C with Mirena IUD insertion.  Per Gyn Onc, recommendation is repeat EMB 3 months there after and if negative q 6 months after or with changes in bleeding.  - Written informed consent was signed in the office today. We discussed risks benefits and alternatives. Procedure is scheduled for procedure this Thursday.  We discussed preop and postop instructions.    Diagnoses and all orders for this visit:    Endometrial hyperplasia without atypia          Subjective:   Ting Vasques is a 48 y.o.  female.  CC: Preop    HPI: Ting presents for preoperative visit for planned hysteroscopy D&C polypectomy.          ROS: Review of Systems   Constitutional:  Negative for chills and fever.   Respiratory:  Negative for cough and shortness of breath.    Cardiovascular:  Negative for chest pain and leg swelling.   Gastrointestinal:  Negative for abdominal pain, nausea and vomiting.   Genitourinary:  Negative for dysuria, frequency and urgency.   Neurological:  Negative for dizziness, light-headedness and headaches.       PFSH: The following portions of the patient's history were reviewed and updated as appropriate: allergies, current medications, past family history, past medical history, obstetric history, gynecologic history, past social history, past surgical history and problem list.       Objective:  /68   Ht 5' 7\" (1.702 m)  "  Wt 118 kg (260 lb)   LMP 12/14/2023 (Exact Date)   BMI 40.72 kg/m²    Physical Exam  Constitutional:       Appearance: Normal appearance.   HENT:      Head: Normocephalic and atraumatic.      Mouth/Throat:      Mouth: Mucous membranes are moist.      Pharynx: Oropharynx is clear. No oropharyngeal exudate.   Cardiovascular:      Rate and Rhythm: Normal rate and regular rhythm.      Pulses: Normal pulses.      Heart sounds: Normal heart sounds. No murmur heard.     No friction rub. No gallop.   Pulmonary:      Effort: Pulmonary effort is normal. No respiratory distress.      Breath sounds: Normal breath sounds. No wheezing, rhonchi or rales.   Abdominal:      General: Abdomen is flat. There is no distension.      Palpations: Abdomen is soft. There is no mass.      Tenderness: There is no abdominal tenderness.      Hernia: No hernia is present.   Musculoskeletal:         General: No deformity or signs of injury. Normal range of motion.      Right lower leg: No edema.      Left lower leg: No edema.   Skin:     General: Skin is warm and dry.   Neurological:      General: No focal deficit present.      Mental Status: She is alert and oriented to person, place, and time.   Psychiatric:         Mood and Affect: Mood normal.         Behavior: Behavior normal.           Portia Barbosa MD  [facility]  [facility]  1/16/2024 10:31 AM

## 2024-01-16 NOTE — ASSESSMENT & PLAN NOTE
- Ting's mother has a history of serous uterine cancer, and she was seen by Gyn Oncology for a second opinion regarding management of endometrial hyperplasia without atypia.  After this consultation, she opted to proceed with the option of hysteroscopy polypectomy D&C with Mirena IUD insertion.  Per Gyn Onc, recommendation is repeat EMB 3 months there after and if negative q 6 months after or with changes in bleeding.  - Written informed consent was signed in the office today. We discussed risks benefits and alternatives. Procedure is scheduled for procedure this Thursday.  We discussed preop and postop instructions.

## 2024-01-16 NOTE — H&P (VIEW-ONLY)
"[facility] [address] Marilyn Patel PA    Assessment/Plan:  Ting Vasques is a 48 y.o.  who presents for pre-operative H&P prior to planned hysteroscopy polypectomy Mirena IUD insertion for endometrial hyperplasia without atypia arising in a polyp.    Endometrial hyperplasia without atypia  - Ting's mother has a history of serous uterine cancer, and she was seen by Gyn Oncology for a second opinion regarding management of endometrial hyperplasia without atypia.  After this consultation, she opted to proceed with the option of hysteroscopy polypectomy D&C with Mirena IUD insertion.  Per Gyn Onc, recommendation is repeat EMB 3 months there after and if negative q 6 months after or with changes in bleeding.  - Written informed consent was signed in the office today. We discussed risks benefits and alternatives. Procedure is scheduled for procedure this Thursday.  We discussed preop and postop instructions.    Diagnoses and all orders for this visit:    Endometrial hyperplasia without atypia          Subjective:   Ting Vasques is a 48 y.o.  female.  CC: Preop    HPI: Ting presents for preoperative visit for planned hysteroscopy D&C polypectomy.          ROS: Review of Systems   Constitutional:  Negative for chills and fever.   Respiratory:  Negative for cough and shortness of breath.    Cardiovascular:  Negative for chest pain and leg swelling.   Gastrointestinal:  Negative for abdominal pain, nausea and vomiting.   Genitourinary:  Negative for dysuria, frequency and urgency.   Neurological:  Negative for dizziness, light-headedness and headaches.       PFSH: The following portions of the patient's history were reviewed and updated as appropriate: allergies, current medications, past family history, past medical history, obstetric history, gynecologic history, past social history, past surgical history and problem list.       Objective:  /68   Ht 5' 7\" (1.702 m)  "  Wt 118 kg (260 lb)   LMP 12/14/2023 (Exact Date)   BMI 40.72 kg/m²    Physical Exam  Constitutional:       Appearance: Normal appearance.   HENT:      Head: Normocephalic and atraumatic.      Mouth/Throat:      Mouth: Mucous membranes are moist.      Pharynx: Oropharynx is clear. No oropharyngeal exudate.   Cardiovascular:      Rate and Rhythm: Normal rate and regular rhythm.      Pulses: Normal pulses.      Heart sounds: Normal heart sounds. No murmur heard.     No friction rub. No gallop.   Pulmonary:      Effort: Pulmonary effort is normal. No respiratory distress.      Breath sounds: Normal breath sounds. No wheezing, rhonchi or rales.   Abdominal:      General: Abdomen is flat. There is no distension.      Palpations: Abdomen is soft. There is no mass.      Tenderness: There is no abdominal tenderness.      Hernia: No hernia is present.   Musculoskeletal:         General: No deformity or signs of injury. Normal range of motion.      Right lower leg: No edema.      Left lower leg: No edema.   Skin:     General: Skin is warm and dry.   Neurological:      General: No focal deficit present.      Mental Status: She is alert and oriented to person, place, and time.   Psychiatric:         Mood and Affect: Mood normal.         Behavior: Behavior normal.           Portia Barbosa MD  [facility]  [facility]  1/16/2024 10:31 AM

## 2024-01-18 ENCOUNTER — HOSPITAL ENCOUNTER (OUTPATIENT)
Facility: HOSPITAL | Age: 49
Setting detail: OUTPATIENT SURGERY
Discharge: HOME | End: 2024-01-18
Attending: STUDENT IN AN ORGANIZED HEALTH CARE EDUCATION/TRAINING PROGRAM | Admitting: STUDENT IN AN ORGANIZED HEALTH CARE EDUCATION/TRAINING PROGRAM
Payer: COMMERCIAL

## 2024-01-18 ENCOUNTER — ANESTHESIA (OUTPATIENT)
Dept: PERIOP | Facility: HOSPITAL | Age: 49
End: 2024-01-18
Payer: COMMERCIAL

## 2024-01-18 VITALS
OXYGEN SATURATION: 98 % | SYSTOLIC BLOOD PRESSURE: 102 MMHG | WEIGHT: 260.14 LBS | DIASTOLIC BLOOD PRESSURE: 55 MMHG | HEART RATE: 84 BPM | TEMPERATURE: 98 F | RESPIRATION RATE: 16 BRPM | BODY MASS INDEX: 40.74 KG/M2

## 2024-01-18 DIAGNOSIS — N84.0 ENDOMETRIAL POLYP: ICD-10-CM

## 2024-01-18 DIAGNOSIS — N85.00 ENDOMETRIAL HYPERPLASIA WITHOUT ATYPIA: ICD-10-CM

## 2024-01-18 PROBLEM — Z30.430 ENCOUNTER FOR INSERTION OF MIRENA IUD: Status: ACTIVE | Noted: 2024-01-18

## 2024-01-18 PROBLEM — Z98.890 STATUS POST DILATION AND CURETTAGE: Status: ACTIVE | Noted: 2024-01-18

## 2024-01-18 LAB
BASOPHILS # BLD AUTO: 0.09 THOUSANDS/ÂΜL (ref 0–0.1)
BASOPHILS NFR BLD AUTO: 1 % (ref 0–1)
EOSINOPHIL # BLD AUTO: 0.38 THOUSAND/ÂΜL (ref 0–0.61)
EOSINOPHIL NFR BLD AUTO: 4 % (ref 0–6)
ERYTHROCYTE [DISTWIDTH] IN BLOOD BY AUTOMATED COUNT: 13.9 % (ref 11.6–15.1)
EXT PREGNANCY TEST URINE: NEGATIVE
EXT. CONTROL: NORMAL
HCT VFR BLD AUTO: 44.8 % (ref 34.8–46.1)
HGB BLD-MCNC: 14.6 G/DL (ref 11.5–15.4)
IMM GRANULOCYTES # BLD AUTO: 0.03 THOUSAND/UL (ref 0–0.2)
IMM GRANULOCYTES NFR BLD AUTO: 0 % (ref 0–2)
LYMPHOCYTES # BLD AUTO: 3.66 THOUSANDS/ÂΜL (ref 0.6–4.47)
LYMPHOCYTES NFR BLD AUTO: 37 % (ref 14–44)
MCH RBC QN AUTO: 27.1 PG (ref 26.8–34.3)
MCHC RBC AUTO-ENTMCNC: 32.6 G/DL (ref 31.4–37.4)
MCV RBC AUTO: 83 FL (ref 82–98)
MONOCYTES # BLD AUTO: 0.59 THOUSAND/ÂΜL (ref 0.17–1.22)
MONOCYTES NFR BLD AUTO: 6 % (ref 4–12)
NEUTROPHILS # BLD AUTO: 5.06 THOUSANDS/ÂΜL (ref 1.85–7.62)
NEUTS SEG NFR BLD AUTO: 52 % (ref 43–75)
NRBC BLD AUTO-RTO: 0 /100 WBCS
PLATELET # BLD AUTO: 330 THOUSANDS/UL (ref 149–390)
PMV BLD AUTO: 9.7 FL (ref 8.9–12.7)
RBC # BLD AUTO: 5.39 MILLION/UL (ref 3.81–5.12)
WBC # BLD AUTO: 9.81 THOUSAND/UL (ref 4.31–10.16)

## 2024-01-18 PROCEDURE — 81025 URINE PREGNANCY TEST: CPT | Performed by: STUDENT IN AN ORGANIZED HEALTH CARE EDUCATION/TRAINING PROGRAM

## 2024-01-18 PROCEDURE — 85025 COMPLETE CBC W/AUTO DIFF WBC: CPT | Performed by: STUDENT IN AN ORGANIZED HEALTH CARE EDUCATION/TRAINING PROGRAM

## 2024-01-18 PROCEDURE — 58300 INSERT INTRAUTERINE DEVICE: CPT | Performed by: STUDENT IN AN ORGANIZED HEALTH CARE EDUCATION/TRAINING PROGRAM

## 2024-01-18 PROCEDURE — 88360 TUMOR IMMUNOHISTOCHEM/MANUAL: CPT | Performed by: PATHOLOGY

## 2024-01-18 PROCEDURE — 88342 IMHCHEM/IMCYTCHM 1ST ANTB: CPT | Performed by: PATHOLOGY

## 2024-01-18 PROCEDURE — 58558 HYSTEROSCOPY BIOPSY: CPT | Performed by: STUDENT IN AN ORGANIZED HEALTH CARE EDUCATION/TRAINING PROGRAM

## 2024-01-18 PROCEDURE — 88305 TISSUE EXAM BY PATHOLOGIST: CPT | Performed by: PATHOLOGY

## 2024-01-18 RX ORDER — PROPOFOL 10 MG/ML
INJECTION, EMULSION INTRAVENOUS CONTINUOUS PRN
Status: DISCONTINUED | OUTPATIENT
Start: 2024-01-18 | End: 2024-01-18

## 2024-01-18 RX ORDER — SODIUM CHLORIDE 9 MG/ML
125 INJECTION, SOLUTION INTRAVENOUS CONTINUOUS
Status: DISCONTINUED | OUTPATIENT
Start: 2024-01-18 | End: 2024-01-18

## 2024-01-18 RX ORDER — FENTANYL CITRATE/PF 50 MCG/ML
50 SYRINGE (ML) INJECTION
Status: DISCONTINUED | OUTPATIENT
Start: 2024-01-18 | End: 2024-01-18 | Stop reason: HOSPADM

## 2024-01-18 RX ORDER — KETOROLAC TROMETHAMINE 30 MG/ML
INJECTION, SOLUTION INTRAMUSCULAR; INTRAVENOUS AS NEEDED
Status: DISCONTINUED | OUTPATIENT
Start: 2024-01-18 | End: 2024-01-18

## 2024-01-18 RX ORDER — ONDANSETRON 2 MG/ML
4 INJECTION INTRAMUSCULAR; INTRAVENOUS EVERY 6 HOURS PRN
Status: DISCONTINUED | OUTPATIENT
Start: 2024-01-18 | End: 2024-01-18 | Stop reason: HOSPADM

## 2024-01-18 RX ORDER — ONDANSETRON 2 MG/ML
INJECTION INTRAMUSCULAR; INTRAVENOUS AS NEEDED
Status: DISCONTINUED | OUTPATIENT
Start: 2024-01-18 | End: 2024-01-18

## 2024-01-18 RX ORDER — ONDANSETRON 2 MG/ML
4 INJECTION INTRAMUSCULAR; INTRAVENOUS ONCE AS NEEDED
Status: DISCONTINUED | OUTPATIENT
Start: 2024-01-18 | End: 2024-01-18 | Stop reason: HOSPADM

## 2024-01-18 RX ORDER — FENTANYL CITRATE 50 UG/ML
INJECTION, SOLUTION INTRAMUSCULAR; INTRAVENOUS AS NEEDED
Status: DISCONTINUED | OUTPATIENT
Start: 2024-01-18 | End: 2024-01-18

## 2024-01-18 RX ORDER — LIDOCAINE HYDROCHLORIDE 20 MG/ML
INJECTION, SOLUTION EPIDURAL; INFILTRATION; INTRACAUDAL; PERINEURAL AS NEEDED
Status: DISCONTINUED | OUTPATIENT
Start: 2024-01-18 | End: 2024-01-18

## 2024-01-18 RX ORDER — SODIUM CHLORIDE, SODIUM LACTATE, POTASSIUM CHLORIDE, CALCIUM CHLORIDE 600; 310; 30; 20 MG/100ML; MG/100ML; MG/100ML; MG/100ML
125 INJECTION, SOLUTION INTRAVENOUS CONTINUOUS
Status: DISCONTINUED | OUTPATIENT
Start: 2024-01-18 | End: 2024-01-18 | Stop reason: HOSPADM

## 2024-01-18 RX ORDER — MAGNESIUM HYDROXIDE 1200 MG/15ML
LIQUID ORAL AS NEEDED
Status: DISCONTINUED | OUTPATIENT
Start: 2024-01-18 | End: 2024-01-18 | Stop reason: HOSPADM

## 2024-01-18 RX ORDER — MIDAZOLAM HYDROCHLORIDE 2 MG/2ML
INJECTION, SOLUTION INTRAMUSCULAR; INTRAVENOUS AS NEEDED
Status: DISCONTINUED | OUTPATIENT
Start: 2024-01-18 | End: 2024-01-18

## 2024-01-18 RX ORDER — PROPOFOL 10 MG/ML
INJECTION, EMULSION INTRAVENOUS AS NEEDED
Status: DISCONTINUED | OUTPATIENT
Start: 2024-01-18 | End: 2024-01-18

## 2024-01-18 RX ORDER — ROCURONIUM BROMIDE 10 MG/ML
INJECTION, SOLUTION INTRAVENOUS AS NEEDED
Status: DISCONTINUED | OUTPATIENT
Start: 2024-01-18 | End: 2024-01-18

## 2024-01-18 RX ADMIN — ROCURONIUM BROMIDE 50 MG: 10 INJECTION, SOLUTION INTRAVENOUS at 15:51

## 2024-01-18 RX ADMIN — FENTANYL CITRATE 50 MCG: 50 INJECTION INTRAMUSCULAR; INTRAVENOUS at 16:37

## 2024-01-18 RX ADMIN — PROPOFOL 200 MG: 10 INJECTION, EMULSION INTRAVENOUS at 15:51

## 2024-01-18 RX ADMIN — SUGAMMADEX 240 MG: 100 INJECTION, SOLUTION INTRAVENOUS at 16:26

## 2024-01-18 RX ADMIN — KETOROLAC TROMETHAMINE 15 MG: 30 INJECTION, SOLUTION INTRAMUSCULAR; INTRAVENOUS at 16:26

## 2024-01-18 RX ADMIN — SODIUM CHLORIDE 125 ML/HR: 0.9 INJECTION, SOLUTION INTRAVENOUS at 14:05

## 2024-01-18 RX ADMIN — LIDOCAINE HYDROCHLORIDE 100 MG: 20 INJECTION, SOLUTION EPIDURAL; INFILTRATION; INTRACAUDAL at 15:51

## 2024-01-18 RX ADMIN — MIDAZOLAM 2 MG: 1 INJECTION INTRAMUSCULAR; INTRAVENOUS at 15:46

## 2024-01-18 RX ADMIN — PROPOFOL 100 MCG/KG/MIN: 10 INJECTION, EMULSION INTRAVENOUS at 15:55

## 2024-01-18 RX ADMIN — FENTANYL CITRATE 50 MCG: 50 INJECTION INTRAMUSCULAR; INTRAVENOUS at 15:51

## 2024-01-18 RX ADMIN — ONDANSETRON 4 MG: 2 INJECTION INTRAMUSCULAR; INTRAVENOUS at 16:26

## 2024-01-18 NOTE — ANESTHESIA PREPROCEDURE EVALUATION
Procedure:  HYSTEROSCOPY, (D&C) , POLYPECTOMY, PSB  MIRENA INSERTION (Uterus)    Relevant Problems   CARDIO   (+) Essential hypertension      GI/HEPATIC   (+) Gastroesophageal reflux disease      Genitourinary   (+) Endometrial hyperplasia without atypia      Other   (+) B12 deficiency   (+) Class 3 severe obesity due to excess calories with serious comorbidity and body mass index (BMI) of 40.0 to 44.9 in adult (HCC) (Zepbound ordered, not yet started)   (+) History of Toni-en-Y gastric bypass   (+) Prediabetes   (+) Spinal stenosis of lumbar region without neurogenic claudication     Echo ordered, 2019 done [facility], unable to view   stress test ordered, not done.     Physical Exam    Airway  Comment: ~3 FB TM distance, micrognathia noted  Mallampati score: II    Neck ROM: full     Dental   No notable dental hx     Cardiovascular  Rhythm: regular, Rate: normal    Pulmonary   Breath sounds clear to auscultation    Other Findings        Anesthesia Plan  ASA Score- 3     Anesthesia Type- general with ASA Monitors.         Additional Monitors:     Airway Plan: ETT and LMA.           Plan Factors-Exercise tolerance (METS): >4 METS.    Chart reviewed.   Existing labs reviewed.     Patient is not a current smoker.      Obstructive sleep apnea risk education given perioperatively.        Induction- intravenous.    Postoperative Plan-     Informed Consent- Anesthetic plan and risks discussed with patient.

## 2024-01-18 NOTE — OP NOTE
OPERATIVE REPORT  PATIENT NAME: Ting Vasques    :  1975  MRN: [id number]  Pt Location: [facility] OR ROOM 06    SURGERY DATE: 2024    Surgeons and Role:     * Portia Barbosa MD - Primary     * Isabella Kellogg MD - Assisting    Preop Diagnosis:  Endometrial hyperplasia without atypia [N85.00]  Endometrial polyp [N84.0]    Post-Op Diagnosis Codes:     * Endometrial hyperplasia without atypia [N85.00]     * Endometrial polyp [N84.0]    Procedure(s):  HYSTEROSCOPY. (D&C) . POLYPECTOMY. PSB  MIRENA INSERTION    Specimen(s):  ID Type Source Tests Collected by Time Destination   1 : EMC AND POLYPS Tissue Endometrium TISSUE EXAM Portia Barbosa MD 2024 1607        Estimated Blood Loss:   Minimal    Drains:  Open Drain Anterior;Left Thigh (Active)   Number of days: 139       Anesthesia Type:   General choice     Operative Indications:  Endometrial hyperplasia without atypia [N85.00]  Endometrial polyp [N84.0]    Operative Findings:  External genitalia grossly normal in appearance.  No ulcerations, lacerations, or lesions.  Bimanual exam revealed uterus was midposition and normal in size and consistency with no palpable uterine or adnexal masses.  Vagina and cervix were grossly normal in appearance without any lacerations or lesions.  Uterus sounded to 9 cm.  Hysteroscopic examination revealed proliferative endometrial lining. 3 distinct polyps were noted. Bilateral tubal ostia were not visualized.    The patient was taken to the operating room. General endotracheal anesthesia (GET) was administered.  Sequential compression devices were placed, and the patient was positioned on the OR table in the dorsal lithotomy position.  All pressure points were padded, and a blossom hugger was placed to maintain control of core body temperature.  A bimanual exam was performed, and the uterus was midposition and normal in size and consistency with no palpable uterine or adnexal masses.  The patient was prepped and  draped in the usual sterile fashion using chlorhexidine.    A time out was performed to confirm correct patient and procedure.  A straight catheter was introduced into the bladder, which was drained of 50 mL of clear yellow urine. A weighted speculum was inserted into the vagina, and a Salas retractor was used to visualize the anterior lip of the cervix, which was then grasped with a single toothed tenaculum.  The uterus was sounded to 9 cm.  The cervix was serially dilated to 18 Lao using Joe dilators for introduction of the hysteroscope.    Using the [employer] fluid management system, the device was primed prior to use.  The Symphion hysteroscope was introduced.  Visualization was limited by 3 large polyps occupying much of the endometrial cavity. There was noted to be proliferative endometrial lining.  Using the Symphion resection device under direct hysteroscopic visualization, the above mentioned polyps were excised. Good hemostasis was noted.  The hysteroscope was removed, and the specimen was sent to pathology. The hysteroscope was withdrawn.    Sharp curetting was performed, starting at the 12 o'clock position and rotating a total of 360 degrees to cover all surfaces.  Endometrial tissue was obtained and sent for pathology.      The Mirena IUD applicator was set to 9 cm, the length the uterus was sounded to.  The applicator was advanced to the uterine fundus, withdrawn 1 cm and the arms of the IUD were deployed.  After waiting 10 seconds, the applicator was advanced back to the uterine fundus and the rest of the IUD was deployed.  The applicator was removed from the endometrial cavity and strings were visualized through the cervical os.  Strings were trimmed to approximately 4 cm.    The fluid deficit reached 600 mL.  The single toothed tenaculum was removed from the anterior lip of the cervix, and good hemostasis was confirmed.  The weighted speculum was removed from the vagina.    At the conclusion of  the procedure, all needle, sponge, and instrument counts were correct x2.    Dr. Barbosa was present and participated in all key portions of the case.    Complications:   None apparent    Patient Disposition:  PACU         SIGNATURE: Isabella Kellogg MD  DATE: January 18, 2024  TIME: 5:39 PM

## 2024-01-18 NOTE — INTERVAL H&P NOTE
H&P reviewed. After examining the patient I find no changes in the patients condition since the H&P had been written.    Vitals:    01/18/24 1356   BP: 116/71   Pulse: 88   Resp: 16   Temp: 97.5 °F (36.4 °C)   SpO2: 99%     Portia Barbosa MD  [facility]  [facility]  1/18/2024 3:31 PM

## 2024-01-19 ENCOUNTER — PATIENT MESSAGE (OUTPATIENT)
Dept: OBGYN CLINIC | Facility: CLINIC | Age: 49
End: 2024-01-19

## 2024-01-25 PROCEDURE — 88342 IMHCHEM/IMCYTCHM 1ST ANTB: CPT | Performed by: PATHOLOGY

## 2024-01-25 PROCEDURE — 88360 TUMOR IMMUNOHISTOCHEM/MANUAL: CPT | Performed by: PATHOLOGY

## 2024-01-25 PROCEDURE — 88305 TISSUE EXAM BY PATHOLOGIST: CPT | Performed by: PATHOLOGY

## 2024-02-04 ENCOUNTER — PATIENT MESSAGE (OUTPATIENT)
Dept: FAMILY MEDICINE CLINIC | Facility: CLINIC | Age: 49
End: 2024-02-04

## 2024-02-04 DIAGNOSIS — G47.33 OSA (OBSTRUCTIVE SLEEP APNEA): ICD-10-CM

## 2024-02-04 DIAGNOSIS — R73.03 PREDIABETES: ICD-10-CM

## 2024-02-04 DIAGNOSIS — E66.01 CLASS 3 SEVERE OBESITY DUE TO EXCESS CALORIES WITH SERIOUS COMORBIDITY AND BODY MASS INDEX (BMI) OF 40.0 TO 44.9 IN ADULT (HCC): Primary | ICD-10-CM

## 2024-02-05 RX ORDER — TIRZEPATIDE 5 MG/.5ML
5 INJECTION, SOLUTION SUBCUTANEOUS WEEKLY
Qty: 2 ML | Refills: 0 | Status: SHIPPED | OUTPATIENT
Start: 2024-02-05 | End: 2024-02-12 | Stop reason: SDUPTHER

## 2024-02-06 ENCOUNTER — PATIENT MESSAGE (OUTPATIENT)
Dept: FAMILY MEDICINE CLINIC | Facility: CLINIC | Age: 49
End: 2024-02-06

## 2024-02-06 DIAGNOSIS — R07.89 CHEST WALL PAIN: ICD-10-CM
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
## 2024-02-10 ENCOUNTER — APPOINTMENT (OUTPATIENT)
Dept: RADIOLOGY | Facility: MEDICAL CENTER | Age: 49
End: 2024-02-10
Payer: COMMERCIAL

## 2024-02-10 DIAGNOSIS — R07.89 CHEST WALL PAIN: ICD-10-CM
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
## 2024-02-10 PROCEDURE — 71101 X-RAY EXAM UNILAT RIBS/CHEST: CPT

## 2024-02-11 ENCOUNTER — PATIENT MESSAGE (OUTPATIENT)
Dept: FAMILY MEDICINE CLINIC | Facility: CLINIC | Age: 49
End: 2024-02-11

## 2024-02-11 DIAGNOSIS — E66.01 CLASS 3 SEVERE OBESITY DUE TO EXCESS CALORIES WITH SERIOUS COMORBIDITY AND BODY MASS INDEX (BMI) OF 40.0 TO 44.9 IN ADULT (HCC): ICD-10-CM

## 2024-02-11 DIAGNOSIS — G47.33 OSA (OBSTRUCTIVE SLEEP APNEA): ICD-10-CM

## 2024-02-11 DIAGNOSIS — R73.03 PREDIABETES: ICD-10-CM

## 2024-02-12 ENCOUNTER — OFFICE VISIT (OUTPATIENT)
Dept: GYNECOLOGIC ONCOLOGY | Facility: CLINIC | Age: 49
End: 2024-02-12
Payer: COMMERCIAL

## 2024-02-12 VITALS
BODY MASS INDEX: 39.39 KG/M2 | OXYGEN SATURATION: 98 % | SYSTOLIC BLOOD PRESSURE: 134 MMHG | WEIGHT: 251 LBS | HEART RATE: 91 BPM | DIASTOLIC BLOOD PRESSURE: 90 MMHG | HEIGHT: 67 IN

## 2024-02-12 DIAGNOSIS — N85.00 ENDOMETRIAL HYPERPLASIA WITHOUT ATYPIA: Primary | ICD-10-CM

## 2024-02-12 DIAGNOSIS — I10 ESSENTIAL HYPERTENSION: ICD-10-CM

## 2024-02-12 PROCEDURE — 99215 OFFICE O/P EST HI 40 MIN: CPT | Performed by: OBSTETRICS & GYNECOLOGY

## 2024-02-12 RX ORDER — ACETAMINOPHEN 325 MG/1
975 TABLET ORAL ONCE
OUTPATIENT
Start: 2024-02-12 | End: 2024-02-12

## 2024-02-12 RX ORDER — HEPARIN SODIUM 5000 [USP'U]/ML
5000 INJECTION, SOLUTION INTRAVENOUS; SUBCUTANEOUS
OUTPATIENT
Start: 2024-02-13 | End: 2024-02-14

## 2024-02-12 RX ORDER — GABAPENTIN 100 MG/1
100 CAPSULE ORAL ONCE
OUTPATIENT
Start: 2024-02-12 | End: 2024-02-12

## 2024-02-12 RX ORDER — TIRZEPATIDE 5 MG/.5ML
5 INJECTION, SOLUTION SUBCUTANEOUS WEEKLY
Qty: 2 ML | Refills: 0 | Status: SHIPPED | OUTPATIENT
Start: 2024-02-12 | End: 2024-03-11

## 2024-02-12 NOTE — ASSESSMENT & PLAN NOTE
49yo withHTN, obesity with h/o elin-en-y and  endometrial hyperplaisa without atypia treated with IUD presents for follow up    Most recent pathology now c/w atypical hyperplasia. We discussed the diagnosis and usual initial management of endometrial hyperplasia, including hysterectomy and bilateral salpingo-oophorectomy. We discussed possibility of underlying carcinoma on final pathology. Given her age, we discussed retention of hte ovaries verse oophorectomy in event carcinoma is identified.   We also discussed the differences between laparotomy and laparoscopic methods (i.e. robotic assisted laparoscopy). We discussed the advantage of a laparoscopic method in terms of length of hospitalization and overall recovery being reduced by a factor of 2 or 3 and the reduced amount of post-operative incision pain. We also discussed the advantage of a robotic assisted approach versus traditional laparoscopy in terms of the level of optical and manual control of instrumentation. She understands that the risks of major complications are approximately 5% and include but are not limited to hemorrhage requiring transfusion, intestinal/urinary tract injury, wound healing impairment, infection, thrombo-embolic complications, cardio-pulmonary and renal complications.   She also understands the possibility of conversion to laparotomy for issues related to safety or findings suggesting more advanced disease than expected where the surgery could not be technically completed laparoscopically   The patient desires to proceed with the robotic assisted approach with TLH/BSO. She understands that with endometrial cancer some patients do require adjuvant treatment with radiation or chemotherapy following surgery. The patient desires to pursue surgical management as detailed above so long as her risk would not be unacceptable compared to the benefit.    Consents signed  PATs

## 2024-02-12 NOTE — PROGRESS NOTES
Assessment/Plan:    Problem List Items Addressed This Visit       Essential hypertension     Continue beta blocker vane operatively          Endometrial hyperplasia without atypia - Primary     47yo withHTN, obesity with h/o elin-en-y and  endometrial hyperplaisa without atypia treated with IUD presents for follow up    Most recent pathology now c/w atypical hyperplasia. We discussed the diagnosis and usual initial management of endometrial hyperplasia, including hysterectomy and bilateral salpingo-oophorectomy. We discussed possibility of underlying carcinoma on final pathology. Given her age, we discussed retention of hte ovaries verse oophorectomy in event carcinoma is identified.   We also discussed the differences between laparotomy and laparoscopic methods (i.e. robotic assisted laparoscopy). We discussed the advantage of a laparoscopic method in terms of length of hospitalization and overall recovery being reduced by a factor of 2 or 3 and the reduced amount of post-operative incision pain. We also discussed the advantage of a robotic assisted approach versus traditional laparoscopy in terms of the level of optical and manual control of instrumentation. She understands that the risks of major complications are approximately 5% and include but are not limited to hemorrhage requiring transfusion, intestinal/urinary tract injury, wound healing impairment, infection, thrombo-embolic complications, cardio-pulmonary and renal complications.   She also understands the possibility of conversion to laparotomy for issues related to safety or findings suggesting more advanced disease than expected where the surgery could not be technically completed laparoscopically   The patient desires to proceed with the robotic assisted approach with TLH/BSO. She understands that with endometrial cancer some patients do require adjuvant treatment with radiation or chemotherapy following surgery. The patient desires to pursue  surgical management as detailed above so long as her risk would not be unacceptable compared to the benefit.    Consents signed  PATs         Relevant Orders    Case request operating room: HYSTERECTOMY LAPAROSCOPIC TOTAL (LTH) W/ ROBOTICS (Completed)    Type and screen    CBC and Platelet    Basic metabolic panel         CHIEF COMPLAINT: EIN      Problem:   Cancer Staging   No matching staging information was found for the patient.        Previous therapy:  Oncology History    No history exists.         Patient ID: Ting Vasques is a 48 y.o. female  49yo withHTN, obesity with h/o elin-en-y and  endometrial hyperplaisa without atypia treated with IUD presents for follow up. Pt did not have D+C/IUD placement until 1/18/24. This showed EIN with atypia. Pt reports mild spotting sinced IUD placement. She thinks she just started her period as well. No pelvic pain. No other new complaints         The following portions of the patient's history were reviewed and updated as appropriate: allergies, current medications, past family history, past medical history, past social history, past surgical history, and problem list.    Review of Systems   Constitutional: Negative.    HENT: Negative.     Eyes: Negative.    Respiratory: Negative.     Cardiovascular: Negative.    Gastrointestinal: Negative.    Endocrine: Negative.    Genitourinary:  Positive for vaginal bleeding.   Musculoskeletal: Negative.    Neurological: Negative.    Psychiatric/Behavioral: Negative.         Current Outpatient Medications   Medication Sig Dispense Refill    acetaminophen (TYLENOL) 500 mg tablet Take 500 mg by mouth every 6 (six) hours as needed for mild pain      ascorbic acid (VITAMIN C) 250 mg tablet Take 1 tablet (250 mg total) by mouth daily With FeSO4 90 tablet 1    azelastine (ASTELIN) 0.1 % nasal spray 2 sprays into each nostril 2 (two) times a day Use in each nostril as directed 30 mL 1    cetirizine (ZyrTEC) 10 mg tablet Take 10 mg by mouth  "daily      dicyclomine (BENTYL) 10 mg capsule Take 1 capsule (10 mg total) by mouth 4 (four) times a day as needed (abdominal pain) 120 capsule 5    DULoxetine (CYMBALTA) 60 mg delayed release capsule Take 2 capsules (120 mg total) by mouth daily 180 capsule 2    esomeprazole (NexIUM) 20 mg capsule Take 1 capsule (20 mg total) by mouth daily in the early morning 90 capsule 2    famotidine (PEPCID) 40 MG tablet Take 1 tablet (40 mg total) by mouth daily at bedtime as needed for heartburn 90 tablet 2    ferrous sulfate 324 (65 Fe) mg Take 2-3 tabs every other day with orange juice or vitamin C 90 tablet 1    folic acid (FOLVITE) 400 mcg tablet Take 1 tablet (400 mcg total) by mouth daily 90 tablet 0    metoprolol succinate (TOPROL-XL) 50 mg 24 hr tablet Take 1 tablet (50 mg total) by mouth daily 90 tablet 2    montelukast (SINGULAIR) 10 mg tablet Take 1 tablet (10 mg total) by mouth daily at bedtime 90 tablet 2    ondansetron (ZOFRAN) 8 mg tablet Take 1 tablet (8 mg total) by mouth every 8 (eight) hours as needed for nausea or vomiting 20 tablet 3    tirzepatide (Zepbound) 5 mg/0.5 mL auto-injector Inject 0.5 mL (5 mg total) under the skin once a week for 28 days 2 mL 0     Current Facility-Administered Medications   Medication Dose Route Frequency Provider Last Rate Last Admin    cyanocobalamin injection 1,000 mcg  1,000 mcg Intramuscular Q30 Days Rosa Blankenship MD   1,000 mcg at 12/04/23 1556           Objective:    Blood pressure 134/90, pulse 91, height 5' 7\" (1.702 m), weight 114 kg (251 lb), last menstrual period 01/18/2024, SpO2 98%, not currently breastfeeding.  Body mass index is 39.31 kg/m².  Body surface area is 2.23 meters squared.    Physical Exam  HENT:      Head: Normocephalic and atraumatic.      Nose: Nose normal.   Cardiovascular:      Rate and Rhythm: Normal rate and regular rhythm.   Pulmonary:      Effort: Pulmonary effort is normal.   Abdominal:      General: There is no distension.      " "Palpations: Abdomen is soft. There is no mass.   Genitourinary:     Comments: defer  Musculoskeletal:         General: No swelling. Normal range of motion.      Cervical back: Normal range of motion.   Skin:     General: Skin is warm and dry.   Neurological:      General: No focal deficit present.      Mental Status: She is alert.   Psychiatric:         Mood and Affect: Mood normal.           Lab Results   Component Value Date     11/24/2015    K 4.5 01/06/2024     01/06/2024    CO2 27 01/06/2024    ANIONGAP 10 11/24/2015    BUN 11 01/06/2024    CREATININE 0.62 01/06/2024    GLUCOSE 96 11/24/2015    GLUF 117 (H) 07/27/2023    CALCIUM 9.0 01/06/2024    AST 13 01/06/2024    ALT 15 01/06/2024    ALKPHOS 83 01/06/2024    PROT 7.5 11/24/2015    BILITOT 0.62 11/24/2015    EGFR 110 01/06/2024     Lab Results   Component Value Date    WBC 9.81 01/18/2024    HGB 14.6 01/18/2024    HCT 44.8 01/18/2024    MCV 83 01/18/2024     01/18/2024     Lab Results   Component Value Date    NEUTROABS 5.06 01/18/2024        Trend:  No results found for: \"\"     "

## 2024-02-14 ENCOUNTER — TELEPHONE (OUTPATIENT)
Dept: GASTROENTEROLOGY | Facility: CLINIC | Age: 49
End: 2024-02-14

## 2024-02-14 NOTE — TELEPHONE ENCOUNTER
Pt called to r/s colonoscopy. Colonoscopy r/s for 5/23/24 with Dr. Calvin in [facility].

## 2024-02-29 ENCOUNTER — PATIENT MESSAGE (OUTPATIENT)
Dept: FAMILY MEDICINE CLINIC | Facility: CLINIC | Age: 49
End: 2024-02-29

## 2024-02-29 DIAGNOSIS — L30.1 DYSHIDROTIC ECZEMA: Primary | ICD-10-CM

## 2024-03-01 RX ORDER — CLOBETASOL PROPIONATE 0.5 MG/G
CREAM TOPICAL 2 TIMES DAILY
Qty: 45 G | Refills: 1 | Status: SHIPPED | OUTPATIENT
Start: 2024-03-01

## 2024-03-09 ENCOUNTER — PATIENT MESSAGE (OUTPATIENT)
Dept: FAMILY MEDICINE CLINIC | Facility: CLINIC | Age: 49
End: 2024-03-09

## 2024-03-09 DIAGNOSIS — G47.33 OSA (OBSTRUCTIVE SLEEP APNEA): ICD-10-CM

## 2024-03-09 DIAGNOSIS — R73.03 PREDIABETES: ICD-10-CM

## 2024-03-09 DIAGNOSIS — E66.01 CLASS 3 SEVERE OBESITY DUE TO EXCESS CALORIES WITH SERIOUS COMORBIDITY AND BODY MASS INDEX (BMI) OF 40.0 TO 44.9 IN ADULT (HCC): Primary | ICD-10-CM

## 2024-03-11 RX ORDER — TIRZEPATIDE 7.5 MG/.5ML
7.5 INJECTION, SOLUTION SUBCUTANEOUS WEEKLY
Qty: 2 ML | Refills: 0 | Status: SHIPPED | OUTPATIENT
Start: 2024-03-11

## 2024-03-18 ENCOUNTER — APPOINTMENT (OUTPATIENT)
Dept: LAB | Facility: CLINIC | Age: 49
End: 2024-03-18
Payer: COMMERCIAL

## 2024-03-18 ENCOUNTER — PATIENT MESSAGE (OUTPATIENT)
Dept: FAMILY MEDICINE CLINIC | Facility: CLINIC | Age: 49
End: 2024-03-18

## 2024-03-18 ENCOUNTER — LAB REQUISITION (OUTPATIENT)
Dept: LAB | Facility: HOSPITAL | Age: 49
End: 2024-03-18
Payer: COMMERCIAL

## 2024-03-18 DIAGNOSIS — I10 ESSENTIAL HYPERTENSION: ICD-10-CM

## 2024-03-18 DIAGNOSIS — N85.00 ENDOMETRIAL HYPERPLASIA WITHOUT ATYPIA: ICD-10-CM

## 2024-03-18 DIAGNOSIS — R73.03 PREDIABETES: ICD-10-CM

## 2024-03-18 DIAGNOSIS — M25.50 POLYARTHRALGIA: ICD-10-CM

## 2024-03-18 DIAGNOSIS — Z01.818 ENCOUNTER FOR OTHER PREPROCEDURAL EXAMINATION: ICD-10-CM

## 2024-03-18 DIAGNOSIS — E53.8 B12 DEFICIENCY: ICD-10-CM

## 2024-03-18 DIAGNOSIS — E55.9 VITAMIN D DEFICIENCY: ICD-10-CM

## 2024-03-18 DIAGNOSIS — Z01.818 PRE-OP TESTING: ICD-10-CM

## 2024-03-18 LAB
ABO GROUP BLD: NORMAL
ANION GAP SERPL CALCULATED.3IONS-SCNC: 15 MMOL/L (ref 4–13)
BLD GP AB SCN SERPL QL: NEGATIVE
BUN SERPL-MCNC: 6 MG/DL (ref 5–25)
CALCIUM SERPL-MCNC: 9.2 MG/DL (ref 8.4–10.2)
CHLORIDE SERPL-SCNC: 103 MMOL/L (ref 96–108)
CO2 SERPL-SCNC: 24 MMOL/L (ref 21–32)
CREAT SERPL-MCNC: 0.65 MG/DL (ref 0.6–1.3)
ERYTHROCYTE [DISTWIDTH] IN BLOOD BY AUTOMATED COUNT: 13.5 % (ref 11.6–15.1)
GFR SERPL CREATININE-BSD FRML MDRD: 105 ML/MIN/1.73SQ M
GLUCOSE P FAST SERPL-MCNC: 99 MG/DL (ref 65–99)
HCT VFR BLD AUTO: 44.9 % (ref 34.8–46.1)
HGB BLD-MCNC: 14.1 G/DL (ref 11.5–15.4)
MCH RBC QN AUTO: 26.7 PG (ref 26.8–34.3)
MCHC RBC AUTO-ENTMCNC: 31.4 G/DL (ref 31.4–37.4)
MCV RBC AUTO: 85 FL (ref 82–98)
PLATELET # BLD AUTO: 279 THOUSANDS/UL (ref 149–390)
PMV BLD AUTO: 10.5 FL (ref 8.9–12.7)
POTASSIUM SERPL-SCNC: 3.9 MMOL/L (ref 3.5–5.3)
RBC # BLD AUTO: 5.29 MILLION/UL (ref 3.81–5.12)
RH BLD: POSITIVE
SODIUM SERPL-SCNC: 142 MMOL/L (ref 135–147)
SPECIMEN EXPIRATION DATE: NORMAL
WBC # BLD AUTO: 5.81 THOUSAND/UL (ref 4.31–10.16)

## 2024-03-18 PROCEDURE — 80048 BASIC METABOLIC PNL TOTAL CA: CPT

## 2024-03-18 PROCEDURE — 36415 COLL VENOUS BLD VENIPUNCTURE: CPT

## 2024-03-18 PROCEDURE — 86900 BLOOD TYPING SEROLOGIC ABO: CPT | Performed by: OBSTETRICS & GYNECOLOGY

## 2024-03-18 PROCEDURE — 85027 COMPLETE CBC AUTOMATED: CPT

## 2024-03-18 PROCEDURE — 86901 BLOOD TYPING SEROLOGIC RH(D): CPT | Performed by: OBSTETRICS & GYNECOLOGY

## 2024-03-18 PROCEDURE — 86850 RBC ANTIBODY SCREEN: CPT | Performed by: OBSTETRICS & GYNECOLOGY

## 2024-03-18 NOTE — PRE-PROCEDURE INSTRUCTIONS
Pre-Surgery Instructions:   Medication Instructions    acetaminophen (TYLENOL) 500 mg tablet Uses PRN- OK to take day of surgery- if needed may use DOS with sip of water     ascorbic acid (VITAMIN C) 250 mg tablet Stop taking 7 days prior to surgery.    azelastine (ASTELIN) 0.1 % nasal spray Uses PRN- OK to take day of surgery    cetirizine (ZyrTEC) 10 mg tablet Take day of surgery.    clobetasol (TEMOVATE) 0.05 % cream Hold day of surgery.    dicyclomine (BENTYL) 10 mg capsule Uses PRN- OK to take day of surgery- if needed may take with sip of water DOS    DULoxetine (CYMBALTA) 60 mg delayed release capsule Take day of surgery. With sip of water     esomeprazole (NexIUM) 20 mg capsule Take day of surgery. With sip of water     famotidine (PEPCID) 40 MG tablet Uses PRN- OK to take day of surgery- if needed may take with sip of water     ferrous sulfate 324 (65 Fe) mg Instructed to use up to night before surgery - hx of anemia     folic acid (FOLVITE) 400 mcg tablet Instructed to use up to night before surgery - hx of anemia     metoprolol succinate (TOPROL-XL) 50 mg 24 hr tablet Take night before surgery    montelukast (SINGULAIR) 10 mg tablet Take night before surgery    ondansetron (ZOFRAN) 8 mg tablet Uses PRN- OK to take day of surgery- if needed may take with sip of water     tirzepatide (Zepbound) 7.5 mg/0.5 mL auto-injector Stop taking 7 days prior to surgery.including DOS - pt reports last dose 3/14/24.    Pt has cleanser and instructions for DOS-- instructions reviewed with showering for pt both night before and DOS - pt verbalized understanding of instructions given.  Pt instructed no hair or body products on skin for DOS  Pt instructed no razor blade shaving within one week prior to surgery - ok to use electric razor up till 3/20 --no shaving 3/21 or 3/22.  Pt instructed also if with any health status changes between now and DOS - notify surgeon office.  Pt instructed with use of Mounjaro ( Tirzepatide)  no use of it this week prior to surgery/per anes to hold 7 days prior to surgery including DOS - pt reports last dose 3/14/24.  Pt reports does have Ensure pre surgery drinks x2- use as instructed by surgeon office /reviewed with pt - if with any questions notify surgeon office.   Medication instructions for day surgery reviewed. Please use only a sip of water to take your instructed medications. Avoid all over the counter vitamins, supplements and NSAIDS for one week prior to surgery per anesthesia guidelines. Tylenol is ok to take as needed.     You will receive a call one business day prior to surgery with an arrival time and hospital directions. If your surgery is scheduled on a Monday, the hospital will be calling you on the Friday prior to your surgery. If you have not heard from anyone by 8pm, please call the hospital supervisor through the hospital  at [phone]. ([place] [phone] or [place] [phone]).    Do not eat or drink anything after midnight the night before your surgery, including candy, mints, lifesavers, or chewing gum. Do not drink alcohol 24hrs before your surgery. Try not to smoke at least 24hrs before your surgery.       Follow the pre surgery showering instructions as listed in the “My Surgical Experience Booklet” or otherwise provided by your surgeon's office. Do not use a blade to shave the surgical area 1 week before surgery. It is okay to use a clean electric clippers up to 24 hours before surgery. Do not apply any lotions, creams, including makeup, cologne, deodorant, or perfumes after showering on the day of your surgery. Do not use dry shampoo, hair spray, hair gel, or any type of hair products.     No contact lenses, eye make-up, or artificial eyelashes. Remove nail polish, including gel polish, and any artificial, gel, or acrylic nails if possible. Remove all jewelry including rings and body piercing jewelry.     Wear causal clothing that is easy to take on and  off. Consider your type of surgery.    Keep any valuables, jewelry, piercings at home. Please bring any specially ordered equipment (sling, braces) if indicated.    Arrange for a responsible person to drive you to and from the hospital on the day of your surgery. Please confirm the visitor policy for the day of your procedure when you receive your phone call with an arrival time.     Call the surgeon's office with any new illnesses, exposures, or additional questions prior to surgery.    Please reference your “My Surgical Experience Booklet” for additional information to prepare for your upcoming surgery.

## 2024-03-19 DIAGNOSIS — E87.29 INCREASED ANION GAP METABOLIC ACIDOSIS: Primary | ICD-10-CM

## 2024-03-21 ENCOUNTER — ANESTHESIA EVENT (OUTPATIENT)
Dept: PERIOP | Facility: HOSPITAL | Age: 49
End: 2024-03-21
Payer: COMMERCIAL

## 2024-03-22 ENCOUNTER — ANESTHESIA (OUTPATIENT)
Dept: PERIOP | Facility: HOSPITAL | Age: 49
End: 2024-03-22
Payer: COMMERCIAL

## 2024-03-22 ENCOUNTER — HOSPITAL ENCOUNTER (OUTPATIENT)
Facility: HOSPITAL | Age: 49
Setting detail: OUTPATIENT SURGERY
Discharge: HOME | End: 2024-03-22
Attending: OBSTETRICS & GYNECOLOGY | Admitting: OBSTETRICS & GYNECOLOGY
Payer: COMMERCIAL

## 2024-03-22 VITALS
HEIGHT: 67 IN | SYSTOLIC BLOOD PRESSURE: 133 MMHG | RESPIRATION RATE: 16 BRPM | DIASTOLIC BLOOD PRESSURE: 78 MMHG | BODY MASS INDEX: 36.64 KG/M2 | OXYGEN SATURATION: 95 % | TEMPERATURE: 97.5 F | WEIGHT: 233.47 LBS | HEART RATE: 92 BPM

## 2024-03-22 DIAGNOSIS — G89.18 POST-OP PAIN: Primary | ICD-10-CM

## 2024-03-22 DIAGNOSIS — N85.00 ENDOMETRIAL HYPERPLASIA WITHOUT ATYPIA: ICD-10-CM

## 2024-03-22 DIAGNOSIS — J30.89 SEASONAL ALLERGIC RHINITIS DUE TO OTHER ALLERGIC TRIGGER: ICD-10-CM

## 2024-03-22 PROBLEM — F41.9 ANXIETY: Status: ACTIVE | Noted: 2024-03-22

## 2024-03-22 PROBLEM — E66.813 CLASS 3 SEVERE OBESITY DUE TO EXCESS CALORIES WITH SERIOUS COMORBIDITY AND BODY MASS INDEX (BMI) OF 40.0 TO 44.9 IN ADULT (HCC): Status: RESOLVED | Noted: 2020-06-01 | Resolved: 2024-03-22

## 2024-03-22 PROBLEM — E66.01 CLASS 3 SEVERE OBESITY DUE TO EXCESS CALORIES WITH SERIOUS COMORBIDITY AND BODY MASS INDEX (BMI) OF 40.0 TO 44.9 IN ADULT (HCC): Status: RESOLVED | Noted: 2020-06-01 | Resolved: 2024-03-22

## 2024-03-22 LAB
EXT PREGNANCY TEST URINE: NEGATIVE
EXT. CONTROL: NORMAL

## 2024-03-22 PROCEDURE — 88309 TISSUE EXAM BY PATHOLOGIST: CPT | Performed by: PATHOLOGY

## 2024-03-22 PROCEDURE — NC001 PR NO CHARGE: Performed by: PHYSICIAN ASSISTANT

## 2024-03-22 PROCEDURE — 88341 IMHCHEM/IMCYTCHM EA ADD ANTB: CPT | Performed by: PATHOLOGY

## 2024-03-22 PROCEDURE — NC001 PR NO CHARGE: Performed by: OBSTETRICS & GYNECOLOGY

## 2024-03-22 PROCEDURE — 88360 TUMOR IMMUNOHISTOCHEM/MANUAL: CPT | Performed by: PATHOLOGY

## 2024-03-22 PROCEDURE — S2900 ROBOTIC SURGICAL SYSTEM: HCPCS | Performed by: OBSTETRICS & GYNECOLOGY

## 2024-03-22 PROCEDURE — 81025 URINE PREGNANCY TEST: CPT | Performed by: OBSTETRICS & GYNECOLOGY

## 2024-03-22 PROCEDURE — 88342 IMHCHEM/IMCYTCHM 1ST ANTB: CPT | Performed by: PATHOLOGY

## 2024-03-22 PROCEDURE — 58571 TLH W/T/O 250 G OR LESS: CPT | Performed by: OBSTETRICS & GYNECOLOGY

## 2024-03-22 PROCEDURE — 88112 CYTOPATH CELL ENHANCE TECH: CPT | Performed by: PATHOLOGY

## 2024-03-22 RX ORDER — ROCURONIUM BROMIDE 10 MG/ML
INJECTION, SOLUTION INTRAVENOUS AS NEEDED
Status: DISCONTINUED | OUTPATIENT
Start: 2024-03-22 | End: 2024-03-22

## 2024-03-22 RX ORDER — PROPOFOL 10 MG/ML
INJECTION, EMULSION INTRAVENOUS AS NEEDED
Status: DISCONTINUED | OUTPATIENT
Start: 2024-03-22 | End: 2024-03-22

## 2024-03-22 RX ORDER — ACETAMINOPHEN 325 MG/1
975 TABLET ORAL EVERY 6 HOURS PRN
Status: DISCONTINUED | OUTPATIENT
Start: 2024-03-22 | End: 2024-03-22 | Stop reason: HOSPADM

## 2024-03-22 RX ORDER — ONDANSETRON 2 MG/ML
4 INJECTION INTRAMUSCULAR; INTRAVENOUS EVERY 6 HOURS PRN
Status: DISCONTINUED | OUTPATIENT
Start: 2024-03-22 | End: 2024-03-22 | Stop reason: HOSPADM

## 2024-03-22 RX ORDER — ACETAMINOPHEN 325 MG/1
975 TABLET ORAL ONCE
Status: COMPLETED | OUTPATIENT
Start: 2024-03-22 | End: 2024-03-22

## 2024-03-22 RX ORDER — HYDROMORPHONE HCL/PF 1 MG/ML
SYRINGE (ML) INJECTION AS NEEDED
Status: DISCONTINUED | OUTPATIENT
Start: 2024-03-22 | End: 2024-03-22

## 2024-03-22 RX ORDER — DEXAMETHASONE SODIUM PHOSPHATE 10 MG/ML
INJECTION, SOLUTION INTRAMUSCULAR; INTRAVENOUS AS NEEDED
Status: DISCONTINUED | OUTPATIENT
Start: 2024-03-22 | End: 2024-03-22

## 2024-03-22 RX ORDER — FENTANYL CITRATE 50 UG/ML
INJECTION, SOLUTION INTRAMUSCULAR; INTRAVENOUS AS NEEDED
Status: DISCONTINUED | OUTPATIENT
Start: 2024-03-22 | End: 2024-03-22

## 2024-03-22 RX ORDER — HEPARIN SODIUM 5000 [USP'U]/ML
5000 INJECTION, SOLUTION INTRAVENOUS; SUBCUTANEOUS
Status: COMPLETED | OUTPATIENT
Start: 2024-03-22 | End: 2024-03-22

## 2024-03-22 RX ORDER — BUPIVACAINE HYDROCHLORIDE 5 MG/ML
INJECTION, SOLUTION EPIDURAL; INTRACAUDAL AS NEEDED
Status: DISCONTINUED | OUTPATIENT
Start: 2024-03-22 | End: 2024-03-22 | Stop reason: HOSPADM

## 2024-03-22 RX ORDER — ONDANSETRON 2 MG/ML
INJECTION INTRAMUSCULAR; INTRAVENOUS AS NEEDED
Status: DISCONTINUED | OUTPATIENT
Start: 2024-03-22 | End: 2024-03-22

## 2024-03-22 RX ORDER — ONDANSETRON 2 MG/ML
4 INJECTION INTRAMUSCULAR; INTRAVENOUS ONCE AS NEEDED
Status: DISCONTINUED | OUTPATIENT
Start: 2024-03-22 | End: 2024-03-22 | Stop reason: HOSPADM

## 2024-03-22 RX ORDER — SODIUM CHLORIDE 9 MG/ML
125 INJECTION, SOLUTION INTRAVENOUS CONTINUOUS
Status: DISCONTINUED | OUTPATIENT
Start: 2024-03-22 | End: 2024-03-22 | Stop reason: HOSPADM

## 2024-03-22 RX ORDER — MIDAZOLAM HYDROCHLORIDE 2 MG/2ML
INJECTION, SOLUTION INTRAMUSCULAR; INTRAVENOUS AS NEEDED
Status: DISCONTINUED | OUTPATIENT
Start: 2024-03-22 | End: 2024-03-22

## 2024-03-22 RX ORDER — CEFAZOLIN SODIUM 2 G/50ML
2000 SOLUTION INTRAVENOUS ONCE
Status: COMPLETED | OUTPATIENT
Start: 2024-03-22 | End: 2024-03-22

## 2024-03-22 RX ORDER — OXYCODONE HYDROCHLORIDE 5 MG/1
5 TABLET ORAL EVERY 4 HOURS PRN
Qty: 15 TABLET | Refills: 0 | Status: SHIPPED | OUTPATIENT
Start: 2024-03-22 | End: 2024-04-01

## 2024-03-22 RX ORDER — GABAPENTIN 100 MG/1
100 CAPSULE ORAL ONCE
Status: COMPLETED | OUTPATIENT
Start: 2024-03-22 | End: 2024-03-22

## 2024-03-22 RX ORDER — LIDOCAINE HYDROCHLORIDE 20 MG/ML
INJECTION, SOLUTION EPIDURAL; INFILTRATION; INTRACAUDAL; PERINEURAL AS NEEDED
Status: DISCONTINUED | OUTPATIENT
Start: 2024-03-22 | End: 2024-03-22

## 2024-03-22 RX ORDER — IBUPROFEN 600 MG/1
600 TABLET ORAL EVERY 6 HOURS PRN
Status: DISCONTINUED | OUTPATIENT
Start: 2024-03-22 | End: 2024-03-22 | Stop reason: HOSPADM

## 2024-03-22 RX ORDER — FENTANYL CITRATE/PF 50 MCG/ML
25 SYRINGE (ML) INJECTION
Status: DISCONTINUED | OUTPATIENT
Start: 2024-03-22 | End: 2024-03-22 | Stop reason: HOSPADM

## 2024-03-22 RX ORDER — SODIUM CHLORIDE, SODIUM LACTATE, POTASSIUM CHLORIDE, CALCIUM CHLORIDE 600; 310; 30; 20 MG/100ML; MG/100ML; MG/100ML; MG/100ML
INJECTION, SOLUTION INTRAVENOUS CONTINUOUS PRN
Status: DISCONTINUED | OUTPATIENT
Start: 2024-03-22 | End: 2024-03-22

## 2024-03-22 RX ORDER — OXYCODONE HYDROCHLORIDE 5 MG/1
5 TABLET ORAL EVERY 4 HOURS PRN
Status: DISCONTINUED | OUTPATIENT
Start: 2024-03-22 | End: 2024-03-22 | Stop reason: HOSPADM

## 2024-03-22 RX ORDER — KETOROLAC TROMETHAMINE 30 MG/ML
INJECTION, SOLUTION INTRAMUSCULAR; INTRAVENOUS AS NEEDED
Status: DISCONTINUED | OUTPATIENT
Start: 2024-03-22 | End: 2024-03-22

## 2024-03-22 RX ORDER — SODIUM CHLORIDE 9 MG/ML
INJECTION, SOLUTION INTRAVENOUS CONTINUOUS PRN
Status: DISCONTINUED | OUTPATIENT
Start: 2024-03-22 | End: 2024-03-22

## 2024-03-22 RX ADMIN — HYDROMORPHONE HYDROCHLORIDE 0.5 MG: 1 INJECTION, SOLUTION INTRAMUSCULAR; INTRAVENOUS; SUBCUTANEOUS at 13:06

## 2024-03-22 RX ADMIN — LIDOCAINE HYDROCHLORIDE 100 MG: 20 INJECTION, SOLUTION EPIDURAL; INFILTRATION; INTRACAUDAL at 12:00

## 2024-03-22 RX ADMIN — ACETAMINOPHEN 325MG 975 MG: 325 TABLET ORAL at 08:25

## 2024-03-22 RX ADMIN — PROPOFOL 30 MG: 10 INJECTION, EMULSION INTRAVENOUS at 13:44

## 2024-03-22 RX ADMIN — SODIUM CHLORIDE 125 ML/HR: 0.9 INJECTION, SOLUTION INTRAVENOUS at 08:50

## 2024-03-22 RX ADMIN — SUGAMMADEX 200 MG: 100 INJECTION, SOLUTION INTRAVENOUS at 13:36

## 2024-03-22 RX ADMIN — FENTANYL CITRATE 50 MCG: 50 INJECTION INTRAMUSCULAR; INTRAVENOUS at 11:59

## 2024-03-22 RX ADMIN — FENTANYL CITRATE 50 MCG: 50 INJECTION INTRAMUSCULAR; INTRAVENOUS at 12:20

## 2024-03-22 RX ADMIN — FENTANYL CITRATE 50 MCG: 50 INJECTION INTRAMUSCULAR; INTRAVENOUS at 12:44

## 2024-03-22 RX ADMIN — MIDAZOLAM 2 MG: 1 INJECTION INTRAMUSCULAR; INTRAVENOUS at 11:53

## 2024-03-22 RX ADMIN — IBUPROFEN 600 MG: 600 TABLET, FILM COATED ORAL at 15:12

## 2024-03-22 RX ADMIN — ROCURONIUM BROMIDE 50 MG: 10 INJECTION, SOLUTION INTRAVENOUS at 12:01

## 2024-03-22 RX ADMIN — ACETAMINOPHEN 325MG 975 MG: 325 TABLET ORAL at 16:34

## 2024-03-22 RX ADMIN — PROPOFOL 150 MG: 10 INJECTION, EMULSION INTRAVENOUS at 12:01

## 2024-03-22 RX ADMIN — CEFAZOLIN SODIUM 2000 MG: 2 SOLUTION INTRAVENOUS at 11:51

## 2024-03-22 RX ADMIN — SODIUM CHLORIDE, SODIUM LACTATE, POTASSIUM CHLORIDE, AND CALCIUM CHLORIDE: .6; .31; .03; .02 INJECTION, SOLUTION INTRAVENOUS at 12:48

## 2024-03-22 RX ADMIN — SODIUM CHLORIDE: 0.9 INJECTION, SOLUTION INTRAVENOUS at 12:05

## 2024-03-22 RX ADMIN — DEXAMETHASONE SODIUM PHOSPHATE 5 MG: 10 INJECTION INTRAMUSCULAR; INTRAVENOUS at 12:03

## 2024-03-22 RX ADMIN — HEPARIN SODIUM 5000 UNITS: 5000 INJECTION INTRAVENOUS; SUBCUTANEOUS at 08:26

## 2024-03-22 RX ADMIN — FENTANYL CITRATE 50 MCG: 50 INJECTION INTRAMUSCULAR; INTRAVENOUS at 13:36

## 2024-03-22 RX ADMIN — ROCURONIUM BROMIDE 20 MG: 10 INJECTION, SOLUTION INTRAVENOUS at 12:34

## 2024-03-22 RX ADMIN — GABAPENTIN 100 MG: 100 CAPSULE ORAL at 08:25

## 2024-03-22 RX ADMIN — ONDANSETRON 4 MG: 2 INJECTION INTRAMUSCULAR; INTRAVENOUS at 13:33

## 2024-03-22 RX ADMIN — KETOROLAC TROMETHAMINE 30 MG: 30 INJECTION, SOLUTION INTRAMUSCULAR; INTRAVENOUS at 13:37

## 2024-03-22 NOTE — ANESTHESIA PREPROCEDURE EVALUATION
Procedure:  (LTH) W/ ROBOTICS, BILATERAL SALPINGO-OOPHORECTOMY ;  (EUA) (Abdomen)    Relevant Problems   CARDIO   (+) Essential hypertension      GI/HEPATIC   (+) Gastroesophageal reflux disease      NEURO/PSYCH   (+) Anxiety      Surgery/Wound/Pain   (+) History of Toni-en-Y gastric bypass      Other   (+) Prediabetes        Physical Exam    Airway    Mallampati score: II  TM Distance: >3 FB  Neck ROM: full     Dental        Cardiovascular  Rhythm: regular, Rate: normal, Cardiovascular exam normal    Pulmonary  Pulmonary exam normal Breath sounds clear to auscultation    Other Findings  post-pubertal.      Anesthesia Plan  ASA Score- 2     Anesthesia Type- general with ASA Monitors.         Additional Monitors:     Airway Plan: ETT.    Comment: TAP blocks pre-procedure discussed with patient and sister. She is agreeable and consents to same..       Plan Factors-    Chart reviewed.   Existing labs reviewed. Patient summary reviewed.    Patient is not a current smoker. Patient not instructed to abstain from smoking on day of procedure. Patient did not smoke on day of surgery.    There is medical exclusion for perioperative obstructive sleep apnea risk education.        Induction- intravenous.    Postoperative Plan- Plan for postoperative opioid use. Planned trial extubation    Informed Consent- Anesthetic plan and risks discussed with patient.

## 2024-03-22 NOTE — DISCHARGE INSTR - AVS FIRST PAGE
[facility] - Gynecologic Oncology  Maida Steele Boulay and Sergey  [phone]    Hysterectomy Discharge Instructions    WHAT YOU NEED TO KNOW:   A hysterectomy is surgery to remove your uterus. Your ovaries, fallopian tubes, cervix, or part of your vagina may also need to be removed. The organs and tissue that will be removed depends on your medical condition.  After a hysterectomy, you will not be able to become pregnant.  If your ovaries are removed, you will go through menopause if you have not already.    DISCHARGE INSTRUCTIONS:   Contact your doctor at the number above if:   You have a fever over 101o.  You have nausea or are vomiting that does not improve after a light meal.   Your pain is getting worse, even after you take medicine.   You feel pain or burning when you urinate, or you have trouble urinating.   You have pus or a foul-smelling odor coming from your vagina.    Your wound is red, swollen, or draining pus.  You see new or an increased amount of bright red blood coming from your vagina or your incisions.   You have questions or concerns about your condition or care.    Seek care immediately:   Your arm or leg feels warm, tender, and painful. It may look swollen and red.  You have increasing abdominal or pelvic pain.   You have heavy vaginal bleeding that fills 1 or more sanitary pads in 1 hour.    Call 911 for any of the following:   You feel lightheaded, short of breath, and have chest pain.   You cough up blood.    Medicines: You may need any of the following:  Prescription medicine may be given. You may receive a prescription for pain medication or be advised to use over the counter (OTC) pain medication such as acetaminophen (Tylenol) or ibuprofen (Advil, Motrin). Ask your healthcare provider how to take this medicine safely.  NSAIDs , such as ibuprofen, help decrease swelling, pain, and fever. NSAIDs can cause stomach bleeding or kidney problems in certain  people. If you take blood thinner medicine, always ask your healthcare provider if NSAIDs are safe for you. Always read the medicine label and follow directions.   Stool softeners help treat or prevent constipation.    Take your medicine as directed. Contact your healthcare provider if you think your medicine is not helping or if you have side effects. Tell him or her if you are allergic to any medicine. Keep a list of the medicines, vitamins, and herbs you take. Include the amounts, and when and why you take them. Bring the list or the pill bottles to follow-up visits. Carry your medicine list with you in case of an emergency.    Activity:   Rest as needed. Get up and move around as directed to help prevent blood clots. Start with short walks and slowly increase the distance every day. Limit the number of times you climb stairs to 2 times each day for the first week. Plan most of your daily activities on one level of your home.      Do not lift objects heavier than 10 pounds for 6 weeks. Avoid strenuous activity for 2 weeks.      Do not strain during bowel movements. High-fiber foods and extra liquids can help you prevent constipation. Examples of high-fiber foods are fruit and bran. Prune juice and water are good liquids to drink.      Do not have sex, use tampons, or douche for up to 8 weeks.     You may shower as soon as the day after surgery.  Tub baths can be taken starting 2 weeks after surgery.Do not go into pools or hot tubs until cleared by your doctor.      Ask when it is safe for you to drive. It is generally safe to drive after 2 weeks and when no longer taking prescription pain medication.    Ask when you may return to work and to other regular activities.    Wound care: Care for your abdominal incisions as directed. Carefully wash around the wound with soap and water. If you have Hibiclens or medicated soap that you were instructed to use before surgery, you may use that to wash with for up to 2 days  after surgery.  If not, any mild non-scented, non-abrasive soap is safe.  It is okay to let the soap and water run over your incision. Do not scrub your incision. Dry the area and put on new, clean bandages as directed. Change your bandages when they get wet or dirty. If you have strips of medical tape, let them fall off on their own. It may take 7 to 14 days for them to fall off. Check your incision every day for redness, swelling, or pus.   Deep breathing: Take deep breaths and cough 10 times each hour. This will decrease your risk for a lung infection. Take a deep breath and hold it for as long as you can. Let the air out and then cough strongly. Deep breaths help open your airway. You may be given an incentive spirometer to help you take deep breaths. Put the plastic piece in your mouth and take a slow, deep breath, then let the air out and cough. Repeat these steps 10 times every hour.   Get support: This surgery may be life-changing for you and your family. You will no longer be able to get pregnant. Sudden changes in the levels of your hormones may occur and cause mood swings and depression. You may feel angry, sad, or frightened, or cry frequently and unexpectedly. These feelings are normal. Talk to your healthcare provider about where you can get support. You can also ask if hormone replacement medicine is right for you.   Follow up with your healthcare provider or gynecologist as directed: You may need to return to have stitches removed, and for other tests. Write down your questions so you remember to ask them during your visits.      © 2017 [employer] Information is for End User's use only and may not be sold, redistributed or otherwise used for commercial purposes. All illustrations and images included in CareNotes® are the copyrighted property of [employer].D.A[employer]M., Inc. or [employer].  The above information is an  only. It is not intended as medical advice for  individual conditions or treatments. Talk to your doctor, nurse or pharmacist before following any medical regimen to see if it is safe and effective for you.

## 2024-03-22 NOTE — ANESTHESIA POSTPROCEDURE EVALUATION
"Post-Op Assessment Note    CV Status:  Stable    Pain management: adequate       Mental Status:  Alert and awake   Hydration Status:  Euvolemic   PONV Controlled:  Controlled   Airway Patency:  Patent     Post Op Vitals Reviewed: Yes    No anethesia notable event occurred.    Staff: Anesthesiologist               BP      Temp      Pulse     Resp      SpO2      /68 (BP Location: Left arm)   Pulse (!) 106   Temp 97.5 °F (36.4 °C) (Temporal)   Resp 16   Ht 5' 7\" (1.702 m)   Wt 106 kg (233 lb 7.5 oz)   LMP 03/14/2024 Comment: per pt has MIRENA in place  SpO2 92%   BMI 36.57 kg/m²     "

## 2024-03-22 NOTE — H&P
H&P Exam - Gynecology Oncology  Ting Vasques 48 y.o. female MRN: [id number]  Unit/Bed#: OR POOL Encounter: [id number]        Assessment/Plan        49yo withHTN, obesity with h/o lein-en-y and  endometrial hyperplaisa without atypia treated with IUD presents for surgical intervention  Previously dscussed risks and benefits of proceeding  Post op recovery and expectations addressed.  Proceed as planned          History of Present Illness     HPI:  Ting Vasques is a 48 y.o. female who presentsf or surgical intervention. No new complaints     Oncology History    No history exists.       Review of Systems   Constitutional: Negative.    HENT: Negative.     Eyes: Negative.    Respiratory: Negative.     Cardiovascular: Negative.    Gastrointestinal: Negative.    Endocrine: Negative.    Genitourinary: Negative.    Musculoskeletal: Negative.    Neurological: Negative.    Psychiatric/Behavioral: Negative.         Historical Information   Past Medical History:   Diagnosis Date    Anemia     Chronic back pain     secondary to spinal stenosis    Endometrial thickening on ultrasound     Environmental allergies     GERD (gastroesophageal reflux disease)     Hypertension     IUD (intrauterine device) in place     Mirena in place at this time per pt    Migraine     Obesity 1975    Prediabetes     Spinal stenosis      Past Surgical History:   Procedure Laterality Date    BARIATRIC SURGERY      CHOLECYSTECTOMY      COLONOSCOPY      GASTRIC BYPASS  2011    MASS EXCISION Left 2023    Procedure: EXCISION BIOPSY TISSUE LESION/MASS LOWER EXTREMITY/thigh;  Surgeon: Quentin Dhaliwal MD;  Location:  MAIN OR;  Service: General    KS HYSTEROSCOPY BX ENDOMETRIUM&/POLYPC W/WO D&C N/A 2024    Procedure: HYSTEROSCOPY, (D&C) , POLYPECTOMY, PSB  MIRENA INSERTION;  Surgeon: Portia Barbosa MD;  Location: [facility];  Service: Gynecology     OB History    Para Term  AB Living   1 0     1 0   SAB IAB  Ectopic Multiple Live Births   1       0      # Outcome Date GA Lbr Ervin/2nd Weight Sex Delivery Anes PTL Lv   1 SAB               Obstetric Comments   Age 16 - menarche   History of Birth Control - Pill.  Blood pressure went up and never took it again.      Family History   Problem Relation Age of Onset    Diabetes Mother     Hypertension Mother     Endometrial cancer Mother 74    Cancer Mother         Endometrial    Diabetes Father     Hypertension Father     Heart disease Father     Prostate cancer Father     Cancer Father         Prostate    Hypertension Sister     No Known Problems Sister     Hypertension Maternal Grandmother     Heart disease Maternal Grandmother     Diabetes Maternal Grandmother     No Known Problems Maternal Grandfather     Breast cancer additional onset Paternal Grandmother 70    Breast cancer Paternal Grandmother 66    Cancer Paternal Grandmother         Laryngeal and breast    Prostate cancer Paternal Grandfather     Bone cancer Paternal Grandfather     Cancer Paternal Grandfather         Prostate    Breast cancer Paternal Aunt 68    Anesthesia problems Neg Hx      Social History   Social History     Substance and Sexual Activity   Alcohol Use Yes    Comment: socially - couple times per year     Social History     Substance and Sexual Activity   Drug Use No    Comment: Denies any drug use per pt     Social History     Tobacco Use   Smoking Status Never   Smokeless Tobacco Never   Tobacco Comments    Never a smoker or use of any tobacco products per pt        Meds/Allergies   Facility-Administered Medications Prior to Admission   Medication    cyanocobalamin injection 1,000 mcg     Medications Prior to Admission   Medication    acetaminophen (TYLENOL) 500 mg tablet    ascorbic acid (VITAMIN C) 250 mg tablet    cetirizine (ZyrTEC) 10 mg tablet    clobetasol (TEMOVATE) 0.05 % cream    dicyclomine (BENTYL) 10 mg capsule    DULoxetine (CYMBALTA) 60 mg delayed release capsule    esomeprazole  "(NexIUM) 20 mg capsule    famotidine (PEPCID) 40 MG tablet    ferrous sulfate 324 (65 Fe) mg    folic acid (FOLVITE) 400 mcg tablet    metoprolol succinate (TOPROL-XL) 50 mg 24 hr tablet    montelukast (SINGULAIR) 10 mg tablet    ondansetron (ZOFRAN) 8 mg tablet    tirzepatide (Zepbound) 7.5 mg/0.5 mL auto-injector    azelastine (ASTELIN) 0.1 % nasal spray     Allergies   Allergen Reactions    Bee Venom Hives    Pollen Extract Allergic Rhinitis       Objective     /73   Pulse 90   Temp 97.9 °F (36.6 °C) (Temporal)   Resp 16   Ht 5' 7\" (1.702 m)   Wt 106 kg (233 lb 7.5 oz)   LMP 03/14/2024 Comment: per pt has MIRENA in place  SpO2 97%   BMI 36.57 kg/m²     No intake/output data recorded.  No intake/output data recorded.    Lab Results   Component Value Date    WBC 5.81 03/18/2024    HGB 14.1 03/18/2024    HCT 44.9 03/18/2024    MCV 85 03/18/2024     03/18/2024       Lab Results   Component Value Date    GLUCOSE 96 11/24/2015    CALCIUM 9.2 03/18/2024     11/24/2015    K 3.9 03/18/2024    CO2 24 03/18/2024     03/18/2024    BUN 6 03/18/2024    CREATININE 0.65 03/18/2024       Physical Exam  HENT:      Head: Normocephalic and atraumatic.      Nose: Nose normal.   Cardiovascular:      Rate and Rhythm: Normal rate and regular rhythm.   Pulmonary:      Effort: Pulmonary effort is normal.   Abdominal:      General: There is no distension.      Palpations: Abdomen is soft. There is no mass.   Genitourinary:     Comments: defer  Musculoskeletal:         General: No swelling. Normal range of motion.      Cervical back: Normal range of motion.   Skin:     General: Skin is warm and dry.   Neurological:      General: No focal deficit present.      Mental Status: She is alert.   Psychiatric:         Mood and Affect: Mood normal.         Imaging: I have personally reviewed pertinent reports.    EKG, Pathology, and Other Studies: I have personally reviewed pertinent reports.        Code Status: No " Maranda Rincon MD  3/22/2024  11:07 AM

## 2024-03-22 NOTE — OP NOTE
OPERATIVE REPORT  PATIENT NAME: Ting Vasques    :  1975  MRN: [id number]  Pt Location: [facility] OR ROOM 07    SURGERY DATE: 3/22/2024    Surgeons and Role:     * Virginia Rincon MD - Primary     * Radha Domínguez PA-C - Assisting     * Isabella Kellogg MD - Assisting     * Julee Moreno MD - Assisting     * Abbey Levine MD - Assisting    Preop Diagnosis:  Endometrial hyperplasia without atypia [N85.00]    Post-Op Diagnosis Codes:     * Endometrial hyperplasia without atypia [N85.00]    Procedure(s):  (LTH) W/ ROBOTICS. BILATERAL SALPINGO-OOPHORECTOMY ;  (EUA)    Specimen(s):  ID Type Source Tests Collected by Time Destination   1 :  Washing Pelvic Washing NON-GYNECOLOGIC CYTOLOGY Virginia Rincon MD 3/22/2024 12:34 PM    2 :  Tissue Uterus w/Bilateral Ovaries and Fallopian Tubes TISSUE EXAM Virginia Rincon MD 3/22/2024  1:19 PM        Estimated Blood Loss:   Minimal    Drains:  Open Drain Anterior;Left Thigh (Active)   Number of days: 203       Urethral Catheter Non-latex 16 Fr. (Active)   Number of days: 0       Anesthesia Type:   General    Operative Indications:  Endometrial hyperplasia without atypia [N85.00]The patient is a 48 y.o. year-old with a history of endometrial hyperplasia with atypia and persistent bleeding  The results of her diagnostic testing, her differential diagnosis and treatment options, and the benefits and risks of these were reviewed. She opted to proceed with surgical management.    Operative Findings:  Normal bowel, omentum, liver, gallbladder.  Normal appearing upper abdomen. Normal appearing uterus, bilateral fallopian tubes and ovaries.     Complications:   None    Procedure and Technique:  The patient was brought to the Operating Room where general anesthesia was induced. The abdomen, vagina and perineum were prepped and draped. Atraumatic vaginal retractors were placed to identify the cervix and complete the examination. The cervix was grasped with a single tooth  tenaculum. The cervix was easily dilated and the uterus sounded to 8 cm. A medium Koh ring was placed around the cervix.  Then an 8 cm GERALDINE cannula was placed in the uterine cavity without difficulty and the vaginal balloon inflated. A Calvin catheter was placed in a sterile fashion.    A point incision was made in the umbilicus, and with the abdomen under anterior traction with two vane-umbilical towel clamps, a Veress needle was inserted into the abdominal cavity with the carbon dioxide on low flow. Immediate pressure drop to 0 mm and diffuse abdominal distention indicated intraperitoneal placement. The peritoneal cavity was then insufflated with carbon dioxide on high flow to maintain a pressure of 10 mm Hg throughout the case.  An incision was made 25 cm above the pubic symphysis, through which the robotic trocar was introduced into the abdominal cavity. The laparoscope was then inserted and the peritoneal cavity explored with the above findings. There was no evidence of visceral injury. Additional robotic ports and a 8mm assistant port were placed under direct visualization.  With the patient in steep Trendelenburg, the robot was docked to the robotic ports and the instruments were placed under direct visualization.    With the uterus on upward traction, the peritoneum was incised in planes lateral and parallel to the ovarian vessels on both sides.  The ureters were then identified retroperitoneally, coursing well posterior to the ovarian vessels. The ovarian vessels were coagulated proximally using the synchroseal and divided using cautery. The round ligaments were isolated, cauterized and cut. The posterior peritoneum was dropped to the level of the koh ring. The vesico-uterine peritoneum was incised, and the bladder dissected from the cervix and upper vagina in a meticulous fashion to the level of the koh ring. The uterine vessels were then skeletonized bilaterally and coagulated at the level of the koh ring  using bipolar current, then divided. With the bladder mobilized anteriorally and the rectum well away posteriorally, using the monopolar device, an incision was made in the anterior upper vagina at the level of the cervical-vaginal junction.  The incision was continued circumferentially around the upper vagina, controlling upper vaginal blood supply laterally using the biopolar. This allowed completely amputation of the specimen. The vaginal balloon was removed. The uterus, cervix, bilateral fallopian tubes and ovaries were then removed en bloc transvaginally.    The upper vagina was then closed laparoscopically with 2-0 stratafix, taking care to avoid bladder injury. The upper vagina was intact and hemostatic. The vaginal balloon was removed.     The robotic instruments were removed, and the robot undocked. The laparoscopic skin incisions were irrigated and made hemostatic using electrocoagulation. They were closed with subcuticular stitches of 4-0 monocryl.     All sponge, needle and instrument counts were correct x2.  Anesthesia was reversed and the patient was taken to the PACU in a stable condition.    I was present for the entire procedure. and A physician assistant was required during the procedure for retraction, tissue handling, dissection and suturing.    Patient Disposition:  PACU     SIGNATURE: Virginia Rincon MD  DATE: March 22, 2024  TIME: 1:32 PM

## 2024-03-26 ENCOUNTER — DOCUMENTATION (OUTPATIENT)
Dept: HEMATOLOGY ONCOLOGY | Facility: CLINIC | Age: 49
End: 2024-03-26

## 2024-03-26 NOTE — PROGRESS NOTES
Received Paperwork   What type of form Blank FMLA   Scanned blank form into patient's Epic chart Yes   Method received form  Fax   Provider responsible for form Dr. Rincon   Informed patient our office turn around time for completing patient forms is 5-7 business days. Yes, informed patient of turn around time     Comments And Short Term Disability

## 2024-04-01 ENCOUNTER — TELEPHONE (OUTPATIENT)
Dept: HEMATOLOGY ONCOLOGY | Facility: CLINIC | Age: 49
End: 2024-04-01

## 2024-04-01 NOTE — TELEPHONE ENCOUNTER
Return call placed. Patient denies vaginal bleeding, discharge or odor, and has been afebrile. Notes discomfort at the end of her urinary stream and with bowel movements. Will plan for ibuprofen 600 mg PO every 6-8hrs x 24-48 hrs with tylenol 1000 mg PO every 6 hrs. Patient to call with worsening pain, vaginal discharge/odor or fevers. F/u call to be made in 48 hrs.

## 2024-04-01 NOTE — TELEPHONE ENCOUNTER
Patient Call    Who are you speaking with? Patient    If it is not the patient, are they listed on an active communication consent form? N/A   What is the reason for this call? Patient calling in regards to symptoms she is experiencing.  Patient had surgery with Dr Rincon on 3/22/24.  Patient started having pain when she urinates and has a bowel movement.  Patient states this pain started on Friday 3/29/24.  Patient has taken tylenol to relieve the pain but it does not help with the pain she is experiencing.     Patient would like a call back to discuss her symptoms.       I contacted [facility] Urgent Triage, Spoke with Chana Scott, I was instructed to send encounter to Gyn On clinical team and she would also pass the message along to Gyn Onc clinical team.      Does this require a call back? Yes   If a call back is required, please list best call back number [phone]   If a call back is required, advise that a message will be forwarded to their care team and someone will return their call as soon as possible.   Did you relay this information to the patient? Yes

## 2024-04-03 ENCOUNTER — TELEPHONE (OUTPATIENT)
Dept: GYNECOLOGIC ONCOLOGY | Facility: CLINIC | Age: 49
End: 2024-04-03

## 2024-04-03 NOTE — TELEPHONE ENCOUNTER
F/U call placed to patient to assess pelvic pain specifically with bowel/bladder function after starting scheduled ibuprofen. She notes improvement in the pain. It is still present, but not as significant as Monday. Will continue to closely monitor.

## 2024-04-04 DIAGNOSIS — R73.03 PREDIABETES: ICD-10-CM

## 2024-04-04 DIAGNOSIS — G47.33 OSA (OBSTRUCTIVE SLEEP APNEA): ICD-10-CM

## 2024-04-04 DIAGNOSIS — E66.01 CLASS 3 SEVERE OBESITY DUE TO EXCESS CALORIES WITH SERIOUS COMORBIDITY AND BODY MASS INDEX (BMI) OF 40.0 TO 44.9 IN ADULT (HCC): ICD-10-CM

## 2024-04-04 PROBLEM — C54.1 ENDOMETRIAL CANCER (HCC): Status: ACTIVE | Noted: 2023-10-24

## 2024-04-04 RX ORDER — TIRZEPATIDE 7.5 MG/.5ML
7.5 INJECTION, SOLUTION SUBCUTANEOUS WEEKLY
Qty: 6 ML | Refills: 0 | Status: SHIPPED | OUTPATIENT
Start: 2024-04-04

## 2024-04-04 NOTE — TELEPHONE ENCOUNTER
Patient requesting refill(s) of: Zepbound     Last filled: 3/11/24  Last appt: 12/4/23  Next appt: 6/5/24  Pharmacy: [facility]

## 2024-04-04 NOTE — ASSESSMENT & PLAN NOTE
49yo withHTN, obesity with h/o elin-en-y and likely stage IA endometrial adenocarcinoma, figo grade 1 s/p RA-TLH/BSO presents for post op and treatment discussion.     Pathology sent for assessment of the ovary given some atypia noted. Small focus of carcinoma was noted among EIN diagnosis.     We discussed the results of her final pathology. We discussed that her case had been reviewed at our department pathology conference. We discussed the results of GOG 99, which was a phase III randomized trial of fully stage patients with intermediate risk endometrial cancer, which identified risk factors for patients who may benefit from postoperative radiation therapy in terms of local regional recurrences and not overall survival. She has a grade 1 tumor, no myometrial invasion and no lymphovascular space invasion. We discussed that given her age and not having any of the three risk factors she does not need postoperative radiation therapy.     We discussed surveillance, which according to NCCN guidelines, is a physical exam every 3-6 months for 2-3 years and then every 6 months or annually. We discussed repeat imaging as clinically indicated.      I will call her with the results of the ovarian pathology when resulted.  We also discussed referral to genetics in setting of STIL and her mothers serous uterine cancer diagnosis. Referral placed.

## 2024-04-04 NOTE — PROGRESS NOTES
Assessment/Plan:    Problem List Items Addressed This Visit       Endometrial cancer (HCC) - Primary     49yo withHTN, obesity with h/o elin-en-y and likely stage IA endometrial adenocarcinoma, figo grade 1 s/p RA-TLH/BSO presents for post op and treatment discussion.     Pathology sent for assessment of the ovary given some atypia noted. Small focus of carcinoma was noted among EIN diagnosis.     We discussed the results of her final pathology. We discussed that her case had been reviewed at our department pathology conference. We discussed the results of GOG 99, which was a phase III randomized trial of fully stage patients with intermediate risk endometrial cancer, which identified risk factors for patients who may benefit from postoperative radiation therapy in terms of local regional recurrences and not overall survival. She has a grade 1 tumor, no myometrial invasion and no lymphovascular space invasion. We discussed that given her age and not having any of the three risk factors she does not need postoperative radiation therapy.     We discussed surveillance, which according to NCCN guidelines, is a physical exam every 3-6 months for 2-3 years and then every 6 months or annually. We discussed repeat imaging as clinically indicated.      I will call her with the results of the ovarian pathology when resulted.  We also discussed referral to genetics in setting of STIL and her mothers serous uterine cancer diagnosis. Referral placed.          Relevant Orders    Ambulatory Referral to Oncology Genetics    CT chest abdomen pelvis w contrast    H/O total hysterectomy with bilateral salpingo-oophorectomy (BSO)     Normal post op complaints.  Healing well.  Continue lifting restrictions               CHIEF COMPLAINT: post op and treatmnet discussion       Problem:   Cancer Staging   No matching staging information was found for the patient.        Previous therapy:  Oncology History    No history exists.  "        Patient ID: Ting Vasques is a 48 y.o. female  47yo withHTN, obesity with h/o elin-en-y and likely stage IA endometrial adenocarcinoma, figo grade 1 s/p RA-TLH/BSO presents for post op and treatment discussion. Pt with persistent \"bladder spasm/strange sensation\". Voiding without dysuria. +pain with Bms, crampy in nature. No constipation. No vaginal bleeding.         The following portions of the patient's history were reviewed and updated as appropriate: allergies, current medications, past family history, past medical history, past social history, past surgical history, and problem list.    Review of Systems   Constitutional: Negative.    HENT: Negative.     Eyes: Negative.    Respiratory: Negative.     Cardiovascular: Negative.    Gastrointestinal:  Positive for abdominal pain.   Endocrine: Negative.    Genitourinary:  Positive for frequency.   Musculoskeletal: Negative.    Neurological: Negative.    Psychiatric/Behavioral: Negative.         Current Outpatient Medications   Medication Sig Dispense Refill    acetaminophen (TYLENOL) 500 mg tablet Take 500 mg by mouth every 6 (six) hours as needed for mild pain      ascorbic acid (VITAMIN C) 250 mg tablet Take 1 tablet (250 mg total) by mouth daily With FeSO4 90 tablet 1    azelastine (ASTELIN) 0.1 % nasal spray 2 sprays into each nostril 2 (two) times a day Use in each nostril as directed (Patient taking differently: 2 sprays into each nostril if needed Use in each nostril as directed) 30 mL 1    cetirizine (ZyrTEC) 10 mg tablet Take 10 mg by mouth daily      clobetasol (TEMOVATE) 0.05 % cream Apply topically 2 (two) times a day 45 g 1    dicyclomine (BENTYL) 10 mg capsule Take 1 capsule (10 mg total) by mouth 4 (four) times a day as needed (abdominal pain) 120 capsule 5    DULoxetine (CYMBALTA) 60 mg delayed release capsule Take 2 capsules (120 mg total) by mouth daily 180 capsule 2    esomeprazole (NexIUM) 20 mg capsule Take 1 capsule (20 mg total) by " "mouth daily in the early morning 90 capsule 2    famotidine (PEPCID) 40 MG tablet Take 1 tablet (40 mg total) by mouth daily at bedtime as needed for heartburn (Patient taking differently: Take 40 mg by mouth daily at bedtime) 90 tablet 2    ibuprofen (MOTRIN) 200 mg tablet Take 600 mg by mouth every 6 (six) hours as needed for mild pain      metoprolol succinate (TOPROL-XL) 50 mg 24 hr tablet Take 1 tablet (50 mg total) by mouth daily (Patient taking differently: Take 50 mg by mouth daily at bedtime) 90 tablet 2    montelukast (SINGULAIR) 10 mg tablet Take 1 tablet (10 mg total) by mouth daily at bedtime 90 tablet 2    ondansetron (ZOFRAN) 8 mg tablet Take 1 tablet (8 mg total) by mouth every 8 (eight) hours as needed for nausea or vomiting 20 tablet 3    tirzepatide (Zepbound) 7.5 mg/0.5 mL auto-injector Inject 0.5 mL (7.5 mg total) under the skin once a week 6 mL 0    folic acid (FOLVITE) 400 mcg tablet Take 1 tablet (400 mcg total) by mouth daily 90 tablet 0     Current Facility-Administered Medications   Medication Dose Route Frequency Provider Last Rate Last Admin    cyanocobalamin injection 1,000 mcg  1,000 mcg Intramuscular Q30 Days Rosa Blankenship MD   1,000 mcg at 12/04/23 1556           Objective:    Blood pressure 130/74, pulse 91, temperature (!) 97 °F (36.1 °C), temperature source Temporal, resp. rate 18, height 5' 7\" (1.702 m), weight 105 kg (231 lb), last menstrual period 03/14/2024, SpO2 98%, not currently breastfeeding.  Body mass index is 36.18 kg/m².  Body surface area is 2.15 meters squared.    Physical Exam  HENT:      Head: Normocephalic and atraumatic.   Cardiovascular:      Rate and Rhythm: Normal rate and regular rhythm.   Pulmonary:      Effort: Pulmonary effort is normal.   Abdominal:      General: There is no distension.      Palpations: Abdomen is soft. There is no mass.      Comments: Incisions c/d/i   Genitourinary:     Comments: Chaperone present for exam.  The external female " "genitalia is normal. The bartholin's, uretheral and skenes glands are normal. The urethral meatus is normal (midline with no lesions). Anus without fissure or lesion. Speculum exam reveals a grossly normal vagina cuff that is healing well. No masses, lesions,discharge or bleeding.  Bimanual exam notes a surgical absent cervix, uterus and adnexal structures. No masses or fullness. Bladder is without fullness, mass or tenderness.      Musculoskeletal:         General: Normal range of motion.      Cervical back: Normal range of motion.   Skin:     General: Skin is warm and dry.   Neurological:      Mental Status: She is alert and oriented to person, place, and time.           Lab Results   Component Value Date     11/24/2015    K 3.9 03/18/2024     03/18/2024    CO2 24 03/18/2024    ANIONGAP 10 11/24/2015    BUN 6 03/18/2024    CREATININE 0.65 03/18/2024    GLUCOSE 96 11/24/2015    GLUF 99 03/18/2024    CALCIUM 9.2 03/18/2024    AST 13 01/06/2024    ALT 15 01/06/2024    ALKPHOS 83 01/06/2024    PROT 7.5 11/24/2015    BILITOT 0.62 11/24/2015    EGFR 105 03/18/2024     Lab Results   Component Value Date    WBC 5.81 03/18/2024    HGB 14.1 03/18/2024    HCT 44.9 03/18/2024    MCV 85 03/18/2024     03/18/2024     Lab Results   Component Value Date    NEUTROABS 5.06 01/18/2024        Trend:  No results found for: \"\"     "

## 2024-04-05 ENCOUNTER — OFFICE VISIT (OUTPATIENT)
Dept: GYNECOLOGIC ONCOLOGY | Facility: CLINIC | Age: 49
End: 2024-04-05
Payer: COMMERCIAL

## 2024-04-05 VITALS
OXYGEN SATURATION: 98 % | SYSTOLIC BLOOD PRESSURE: 130 MMHG | HEART RATE: 91 BPM | DIASTOLIC BLOOD PRESSURE: 74 MMHG | BODY MASS INDEX: 36.26 KG/M2 | HEIGHT: 67 IN | WEIGHT: 231 LBS | TEMPERATURE: 97 F | RESPIRATION RATE: 18 BRPM

## 2024-04-05 DIAGNOSIS — Z90.722 H/O TOTAL HYSTERECTOMY WITH BILATERAL SALPINGO-OOPHORECTOMY (BSO): ICD-10-CM

## 2024-04-05 DIAGNOSIS — Z90.79 H/O TOTAL HYSTERECTOMY WITH BILATERAL SALPINGO-OOPHORECTOMY (BSO): ICD-10-CM

## 2024-04-05 DIAGNOSIS — Z90.710 H/O TOTAL HYSTERECTOMY WITH BILATERAL SALPINGO-OOPHORECTOMY (BSO): ICD-10-CM

## 2024-04-05 DIAGNOSIS — C54.1 ENDOMETRIAL CANCER (HCC): Primary | ICD-10-CM

## 2024-04-05 PROBLEM — Z98.890 STATUS POST DILATION AND CURETTAGE: Status: RESOLVED | Noted: 2024-01-18 | Resolved: 2024-04-05

## 2024-04-05 PROCEDURE — 99215 OFFICE O/P EST HI 40 MIN: CPT | Performed by: OBSTETRICS & GYNECOLOGY

## 2024-04-05 RX ORDER — IBUPROFEN 200 MG
600 TABLET ORAL EVERY 6 HOURS PRN
COMMUNITY

## 2024-04-08 ENCOUNTER — PATIENT MESSAGE (OUTPATIENT)
Dept: FAMILY MEDICINE CLINIC | Facility: CLINIC | Age: 49
End: 2024-04-08

## 2024-04-08 DIAGNOSIS — E66.01 CLASS 3 SEVERE OBESITY DUE TO EXCESS CALORIES WITH SERIOUS COMORBIDITY AND BODY MASS INDEX (BMI) OF 40.0 TO 44.9 IN ADULT (HCC): Primary | ICD-10-CM

## 2024-04-08 DIAGNOSIS — G47.33 OSA (OBSTRUCTIVE SLEEP APNEA): ICD-10-CM

## 2024-04-08 DIAGNOSIS — R73.03 PREDIABETES: ICD-10-CM

## 2024-04-08 DIAGNOSIS — C22.0 HEPATOCELLULAR CARCINOMA (HCC): Primary | ICD-10-CM

## 2024-04-08 RX ORDER — TIRZEPATIDE 10 MG/.5ML
10 INJECTION, SOLUTION SUBCUTANEOUS WEEKLY
Qty: 2 ML | Refills: 1 | Status: SHIPPED | OUTPATIENT
Start: 2024-04-08

## 2024-04-08 RX ORDER — TIRZEPATIDE 10 MG/.5ML
10 INJECTION, SOLUTION SUBCUTANEOUS WEEKLY
Qty: 2 ML | Refills: 1 | Status: SHIPPED | OUTPATIENT
Start: 2024-04-08 | End: 2024-04-08

## 2024-04-09 ENCOUNTER — TELEPHONE (OUTPATIENT)
Dept: HEMATOLOGY ONCOLOGY | Facility: CLINIC | Age: 49
End: 2024-04-09

## 2024-04-09 NOTE — TELEPHONE ENCOUNTER
I called Ting to schedule a new patient appointment with the Cancer Risk and Genetics Program.      Outcome:   I left a voice message encouraging the patient to call the [facility] at [phone] to schedule this appointment. A Hydrocisiont message was also send with our contact information.     Follow-up:   We will make one more attempt to reach the patient.      Referral Type: Urgent     Where to Schedule: Next Urgent Slot

## 2024-04-11 ENCOUNTER — HOSPITAL ENCOUNTER (OUTPATIENT)
Dept: CT IMAGING | Facility: HOSPITAL | Age: 49
End: 2024-04-11
Attending: OBSTETRICS & GYNECOLOGY
Payer: COMMERCIAL

## 2024-04-11 DIAGNOSIS — C54.1 ENDOMETRIAL CANCER (HCC): ICD-10-CM

## 2024-04-11 PROCEDURE — 71260 CT THORAX DX C+: CPT

## 2024-04-11 PROCEDURE — 74177 CT ABD & PELVIS W/CONTRAST: CPT

## 2024-04-11 RX ADMIN — IOHEXOL 100 ML: 350 INJECTION, SOLUTION INTRAVENOUS at 13:41

## 2024-04-12 ENCOUNTER — TELEPHONE (OUTPATIENT)
Dept: HEMATOLOGY ONCOLOGY | Facility: CLINIC | Age: 49
End: 2024-04-12

## 2024-04-12 DIAGNOSIS — E66.01 CLASS 3 SEVERE OBESITY DUE TO EXCESS CALORIES WITH SERIOUS COMORBIDITY AND BODY MASS INDEX (BMI) OF 40.0 TO 44.9 IN ADULT (HCC): ICD-10-CM

## 2024-04-12 DIAGNOSIS — R73.03 PREDIABETES: ICD-10-CM

## 2024-04-12 DIAGNOSIS — G47.33 OSA (OBSTRUCTIVE SLEEP APNEA): ICD-10-CM

## 2024-04-12 RX ORDER — TIRZEPATIDE 7.5 MG/.5ML
7.5 INJECTION, SOLUTION SUBCUTANEOUS WEEKLY
Qty: 6 ML | Refills: 0 | Status: CANCELLED | OUTPATIENT
Start: 2024-04-12

## 2024-04-12 NOTE — TELEPHONE ENCOUNTER
I spoke with Ting to schedule their consultation with Cancer Risk and Genetics.     Scheduling Outcome: Patient is scheduled for an appointment on 4/19/24 at 11:30 AM with Rina Lozada    Personal/Family History Related to Appointment:     Personal History of Cancer: Patient reports no personal history of cancer    Family History of Cancer: Patient reports family history of Mother endometrial cancer diagnosed age 73, Father prostate cancer diagnosed age 75, Paternal grandmother laryngeal cancer diagnosed age 30's breast cancer diagnosed age unknown,  Paternal grandfather prostate cancer diagnosed age 70's, Paternal aunt breast cancer diagnosed age 60's    Is patient of Ashkenazi Hindu Heritage?: No    History of Genetic Testing:  Personal History of Genetic Testing: Patient report no personal history of Genetic Testing.    Family History of Genetic Testing: Patient reports that no family members have had Genetic Testing.     Patient's preferred e-mail address: [email or web address]

## 2024-04-13 ENCOUNTER — PATIENT MESSAGE (OUTPATIENT)
Dept: FAMILY MEDICINE CLINIC | Facility: CLINIC | Age: 49
End: 2024-04-13

## 2024-04-13 DIAGNOSIS — G47.33 OSA (OBSTRUCTIVE SLEEP APNEA): ICD-10-CM

## 2024-04-13 DIAGNOSIS — E66.01 CLASS 3 SEVERE OBESITY DUE TO EXCESS CALORIES WITH SERIOUS COMORBIDITY AND BODY MASS INDEX (BMI) OF 40.0 TO 44.9 IN ADULT (HCC): ICD-10-CM

## 2024-04-13 DIAGNOSIS — R73.03 PREDIABETES: ICD-10-CM

## 2024-04-15 ENCOUNTER — TELEPHONE (OUTPATIENT)
Dept: FAMILY MEDICINE CLINIC | Facility: CLINIC | Age: 49
End: 2024-04-15

## 2024-04-15 RX ORDER — TIRZEPATIDE 7.5 MG/.5ML
7.5 INJECTION, SOLUTION SUBCUTANEOUS WEEKLY
Qty: 6 ML | Refills: 1 | Status: SHIPPED | OUTPATIENT
Start: 2024-04-15 | End: 2024-04-16 | Stop reason: SDUPTHER

## 2024-04-15 NOTE — TELEPHONE ENCOUNTER
"Attempted to submit prior auth for zepbound 7.5 on covermymeds.    Received message stating \"Clinical Override not needed\"  "

## 2024-04-16 ENCOUNTER — PATIENT MESSAGE (OUTPATIENT)
Dept: FAMILY MEDICINE CLINIC | Facility: CLINIC | Age: 49
End: 2024-04-16

## 2024-04-16 DIAGNOSIS — E66.01 CLASS 3 SEVERE OBESITY DUE TO EXCESS CALORIES WITH SERIOUS COMORBIDITY AND BODY MASS INDEX (BMI) OF 40.0 TO 44.9 IN ADULT (HCC): ICD-10-CM

## 2024-04-16 DIAGNOSIS — G47.33 OSA (OBSTRUCTIVE SLEEP APNEA): ICD-10-CM

## 2024-04-16 DIAGNOSIS — R73.03 PREDIABETES: ICD-10-CM

## 2024-04-16 RX ORDER — TIRZEPATIDE 7.5 MG/.5ML
7.5 INJECTION, SOLUTION SUBCUTANEOUS WEEKLY
Qty: 6 ML | Refills: 1 | Status: SHIPPED | OUTPATIENT
Start: 2024-04-16

## 2024-04-17 ENCOUNTER — TELEPHONE (OUTPATIENT)
Dept: GENETICS | Facility: CLINIC | Age: 49
End: 2024-04-17

## 2024-04-17 ENCOUNTER — DOCUMENTATION (OUTPATIENT)
Dept: HEMATOLOGY ONCOLOGY | Facility: CLINIC | Age: 49
End: 2024-04-17

## 2024-04-17 NOTE — PROGRESS NOTES
In-basket message received from Dr. Rincon to add patient to the [facility] on 4/22/2024. Chart reviewed and prep completed.

## 2024-04-17 NOTE — TELEPHONE ENCOUNTER
I called and spoke to the patient to confirm an upcoming telephone appointment on 4/19/2024 11:30am with Rina.

## 2024-04-18 ENCOUNTER — PATIENT MESSAGE (OUTPATIENT)
Dept: FAMILY MEDICINE CLINIC | Facility: CLINIC | Age: 49
End: 2024-04-18

## 2024-04-18 DIAGNOSIS — E66.01 CLASS 3 SEVERE OBESITY DUE TO EXCESS CALORIES WITH SERIOUS COMORBIDITY AND BODY MASS INDEX (BMI) OF 40.0 TO 44.9 IN ADULT (HCC): ICD-10-CM

## 2024-04-18 DIAGNOSIS — E04.1 THYROID NODULE: Primary | ICD-10-CM

## 2024-04-19 ENCOUNTER — TELEPHONE (OUTPATIENT)
Dept: HEMATOLOGY ONCOLOGY | Facility: CLINIC | Age: 49
End: 2024-04-19

## 2024-04-19 NOTE — TELEPHONE ENCOUNTER
Appointment Change  Cancel, Reschedule, Change to Virtual      Who are you speaking with? Patient   If it is not the patient, is the caller listed on the communication consent form? N/A   Which provider is the appointment scheduled with? Rina Lozada   When was the original appointment scheduled?    Please list date and time 4/19/24 11:30 AM   At which location is the appointment scheduled to take place? [facility]   Was the appointment rescheduled?     Was the appointment changed from an in person visit to a virtual visit?    If so, please list the details of the change. No   What is the reason for the appointment change? Pt had a pt added to her schedule at work and she is unable to make the appt today. Pt is an ASAP referral, is there another spot next week pt can be scheduled?       Was STAR transport scheduled? No   Does STAR transport need to be scheduled for the new visit (if applicable) No   Does the patient need an infusion appointment rescheduled? No   Does the patient have an upcoming infusion appointment scheduled? If so, when? No   Is the patient undergoing chemotherapy? No   For appointments cancelled with less than 24 hours:  Was the no-show policy reviewed? Yes

## 2024-04-22 ENCOUNTER — CLINICAL SUPPORT (OUTPATIENT)
Dept: GENETICS | Facility: CLINIC | Age: 49
End: 2024-04-22

## 2024-04-22 ENCOUNTER — DOCUMENTATION (OUTPATIENT)
Dept: GENETICS | Facility: CLINIC | Age: 49
End: 2024-04-22

## 2024-04-22 ENCOUNTER — TELEPHONE (OUTPATIENT)
Dept: HEMATOLOGY ONCOLOGY | Facility: CLINIC | Age: 49
End: 2024-04-22

## 2024-04-22 DIAGNOSIS — Z80.49 FAMILY HISTORY OF UTERINE CANCER: ICD-10-CM

## 2024-04-22 DIAGNOSIS — Z80.42 FAMILY HISTORY OF PROSTATE CANCER: ICD-10-CM

## 2024-04-22 DIAGNOSIS — C54.1 ENDOMETRIAL CANCER (HCC): Primary | ICD-10-CM

## 2024-04-22 DIAGNOSIS — Z80.3 FAMILY HISTORY OF BREAST CANCER: ICD-10-CM

## 2024-04-22 NOTE — TELEPHONE ENCOUNTER
Patient Call    Who are you speaking with? [facility] central scheduling     If it is not the patient, are they listed on an active communication consent form? N/A   What is the reason for this call? April is requesting to have the patient's US faxed to the office.    Does this require a call back? No   If a call back is required, please list best call back number phone: [phone]    fax: [phone]  ATTN: April    If a call back is required, advise that a message will be forwarded to their care team and someone will return their call as soon as possible.   Did you relay this information to the patient? No

## 2024-04-22 NOTE — TELEPHONE ENCOUNTER
Appointment Schedule   Who are you speaking with? Patient   If it is not the patient, are they listed on an active communication consent form? Yes   Which provider is the appointment scheduled with? Rina   At which location is the appointment scheduled for? [facility]   When is the appointment scheduled?  Please list date and time 4/22/24   What is the reason for this appointment? Patient cancelled her appt by mistake.    Did patient voice understanding of the details of this appointment? Yes   Was the no show policy reviewed with patient? Yes

## 2024-04-22 NOTE — PROGRESS NOTES
Pre-Test Genetic Counseling Consult Note    Patient Name: Ting Vasques   /Age: 1975/48 y.o.  Referring Provider: Virginia Rincon MD     Date of Service: 2024  Genetic Counselor: [place]ozada [place] [facility] [place]pretation Services: None  Location: Telephone consult   Length of Visit: 60 Minutes    Ting was referred to the 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  She also has a family history of uterine, prostate and breast cancer.  She presents today to discuss the possibility of a hereditary cancer syndrome, options for genetic testing, and implications for her and her family.      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    Addendum   RESULTS OF IMMUNOHISTOCHEMICAL ANALYSIS FOR MISMATCH REPAIR PROTEIN LOSS  INTERPRETATION: No loss of nuclear expression of MMR proteins: low probability of MSI-H        RESULTS:  Antibody            Clone                 Description                                 Results  MLH1                 M1                     Mismatch repair protein  Intact nuclear expression  MSH2                [id number]        Mismatch repair protein  Intact nuclear expression  MSH6                SP93                  Mismatch repair protein  Intact nuclear expression  PMS2                 A16                    Mismatch repair protein  Intact nuclear expression       Final Diagnosis   A.  Uterus, cervix, bilateral fallopian tubes and ovaries (hysterectomy and bilateral salpingo-oophorectomy):     - Endometrial adenocarcinoma, endometrioid type (FIGO grade 1) arising in a background of complex atypical hyperplasia, in an endometrial polyp.     - Negative for myometrial invasion and lymph-vascular invasion.     - Left ovary: Focal atypical  "proliferative endometrioid tumor (endometrioid borderline tumor).     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       Screening Hx:   Breast:  Breast Imaging: Routine mammograms   Breast Biopsy: No prior biopsy   Breast Density: Almost entirely fatty    Colon:  Colonoscopy: Next colonoscopy scheduled for May 2024; no history of colon polyps     Gynecologic:  JEAN-PAUL/BSO 3/22/24    Skin:  No current screening    Reproductive History  Age at menarche: 16  Age at first live birth: Nulliparous  Menopause: Post Menopausal (age 48)  Hormone replacement: NA    Medical and Surgical History  Pertinent surgical history:   Past Surgical History:   Procedure Laterality Date    BARIATRIC SURGERY  2011    CHOLECYSTECTOMY      COLONOSCOPY      GASTRIC BYPASS  2011    MASS EXCISION Left 9/1/2023    Procedure: EXCISION BIOPSY TISSUE LESION/MASS LOWER EXTREMITY/thigh;  Surgeon: Quentin Dhaliwal MD;  Location:  MAIN OR;  Service: General    ND HYSTEROSCOPY BX ENDOMETRIUM&/POLYPC W/WO D&C N/A 1/18/2024    Procedure: HYSTEROSCOPY, (D&C) , POLYPECTOMY, PSB  MIRENA INSERTION;  Surgeon: Portia Barbosa MD;  Location: [facility];  Service: Gynecology    ND LAPS TOTAL HYSTERECT 250 GM/< W/RMVL TUBE/OVARY N/A 3/22/2024    Procedure: (LTH) W/ ROBOTICS, BILATERAL SALPINGO-OOPHORECTOMY ;  (EUA);  Surgeon: Virginia Rincon MD;  Location: [facility];  Service: Gynecology Oncology      Pertinent medical history:  Past Medical History:   Diagnosis Date    Anemia 2011    Chronic back pain     secondary to spinal stenosis    Endometrial thickening on ultrasound     Environmental allergies     GERD (gastroesophageal reflux disease)     Hypertension     IUD (intrauterine device) in place     Mirena in place at this time per pt    Migraine     Obesity 1975    Prediabetes     Spinal stenosis        Other History:  Height:   Ht Readings from Last 1 Encounters:   04/05/24 5' 7\" " (1.702 m)     Weight:   Wt Readings from Last 1 Encounters:   04/05/24 105 kg (231 lb)     Relevant Family History   Patient reports no Ashkenazi Latter-day ancestry.         Please refer to the scanned pedigree in the Media Tab for a complete family history     *All history is reported as provided by the patient; records are not available for review, except where indicated.     Assessment:  We discussed sporadic, familial and hereditary cancer.  We also discussed the many factors that influence our risk for cancer such as age, environmental exposures, lifestyle choices and family history.      We reviewed the indications suggestive of a hereditary predisposition to cancer.    Genetic testing is indicated for Ting based on the following criteria:         The risks, benefits, and limitations of genetic testing were reviewed with the patient, as well as genetic discrimination laws, and possible test results (positive, negative, variants of uncertain significance) and their clinical implications. If positive for a mutation, options for managing cancer risk including increased surveillance, chemoprevention, and in some cases prophylactic surgery were discussed. Ting was informed that if a hereditary cancer syndrome was identified in her, first degree relatives (parents, siblings, and children) have a chance of also inheriting the condition. Genetic testing would allow for predictive genetic testing in other relatives, who may also be at risk depending on their degree of relation.     Billing:  Most individuals pay <$100 for hereditary cancer genetic testing. If insurance covers the cost of the testing, individuals may still pay out of pocket secondary to co-pays, co-insurance, or deductibles. If the cost of the testing exceeds $100, the lab will reach out to the patient via phone or e-mail. The patient will then have the option to proceed with the testing, cancel the testing, or elect the self-pay option of $250. Ting  verbalized understanding.     Plan: Patient decided to proceed with testing and provided consent.    Summary:     Sample Collection:  A blood kit was mailed home to the patient    Genetic Testing Preformed: [employer]: Cancer® +[employer]® (59 genes): APC, RYAN, AXIN2, BAP1, BARD1, BMPR1A, BRCA1, BRCA2, BRIP1, CDH1, CDK4, CDKN1B, CDKN2A, CHEK2, CTNNA1, DICER1, EGLN1, EPCAM, FH, FLCN, GREM1, HOXB13, KIF1B, KIT, MAX, MEN1, MET, MITF, MLH1, MSH2, MSH3, MSH6, MUTYH, NF1, NTHL1, PALB2, PDGFRA PMS2, POLD1, POLE, POT1, PTEN, RAD51C, RAD51D, RB1, RET, SDHA, SDHAF2, SDHB, SDHC, SDHD, SMAD4, SMARCA4, STK11, KJYB248, TP53, TSC1, TSC2, VHL      Result Call Information:  In the event that we need to reach Ting via telephone:  I confirmed the patient's mobile number on file as the best number to call with results  I confirmed with the patient that we can leave a voicemail on the provided numbers    Results take approximately 2-3 weeks to complete once test is started.    Ting will be notified via [employer] once results are available.      Additional recommendations for surveillance/medical management will be made pending genetic test results.

## 2024-04-23 ENCOUNTER — DOCUMENTATION (OUTPATIENT)
Dept: GYNECOLOGIC ONCOLOGY | Facility: CLINIC | Age: 49
End: 2024-04-23

## 2024-04-23 RX ORDER — PHENTERMINE HYDROCHLORIDE 15 MG/1
15 CAPSULE ORAL EVERY MORNING
Qty: 30 CAPSULE | Refills: 1 | Status: SHIPPED | OUTPATIENT
Start: 2024-04-23 | End: 2024-05-02

## 2024-04-23 RX ORDER — TOPIRAMATE SPINKLE 25 MG/1
25 CAPSULE ORAL 2 TIMES DAILY
Qty: 60 CAPSULE | Refills: 1 | Status: SHIPPED | OUTPATIENT
Start: 2024-04-23 | End: 2024-05-02

## 2024-04-23 NOTE — PROGRESS NOTES
Multidisciplinary Gynecologic Oncology Tumor Case Review       Physician Recommended Plan     Ting Vasques is a 48 y.o. female     Diagnosis: Endometrial adenocarcinoma, endometrioid type (FIGO grade 1) arising in a background of complex atypical hyperplasia, in an endometrial polyp.  Left ovary: Focal atypical proliferative endometrioid tumor (endometrioid borderline tumor).  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     Patient was discussed at the Multidisciplinary Gynecologic Oncology Case review on 4/22/2024. The group recommended routine surveillance.     Follow-up appointment with Dr Rincon on 7/8/2024.     NCCN guidelines were available for review.     The final treatment plan will be left to the discretion of the patient and the treating physician.       DISCLAIMERS:    TO THE TREATING PHYSICIAN:  This conference is a meeting of clinicians from various specialty areas who evaluate and discuss patients for whom a multidisciplinary treatment approach is being considered. Please note that the above opinion was a consensus of the conference attendees and is intended only to assist in quality care of the discussed patient.  The responsibility for follow up on the input given during the conference, along with any final decisions regarding plan of care, is that of the patient and the patient's provider. Accordingly, appointments have only been recommended based on this information and have NOT been scheduled unless otherwise noted.      TO THE PATIENT:  This summary is a brief record of major aspects of your cancer treatment. You may choose to share a copy with any of your doctors or nurses. However, this is not a detailed or comprehensive record of your care.

## 2024-04-29 ENCOUNTER — PATIENT MESSAGE (OUTPATIENT)
Dept: FAMILY MEDICINE CLINIC | Facility: CLINIC | Age: 49
End: 2024-04-29

## 2024-04-30 ENCOUNTER — PATIENT MESSAGE (OUTPATIENT)
Dept: FAMILY MEDICINE CLINIC | Facility: CLINIC | Age: 49
End: 2024-04-30

## 2024-05-01 ENCOUNTER — PATIENT MESSAGE (OUTPATIENT)
Dept: FAMILY MEDICINE CLINIC | Facility: CLINIC | Age: 49
End: 2024-05-01

## 2024-05-01 DIAGNOSIS — G47.33 OSA (OBSTRUCTIVE SLEEP APNEA): ICD-10-CM

## 2024-05-01 DIAGNOSIS — E66.01 CLASS 3 SEVERE OBESITY DUE TO EXCESS CALORIES WITH SERIOUS COMORBIDITY AND BODY MASS INDEX (BMI) OF 40.0 TO 44.9 IN ADULT (HCC): Primary | ICD-10-CM

## 2024-05-01 DIAGNOSIS — R73.03 PREDIABETES: ICD-10-CM

## 2024-05-02 RX ORDER — TOPIRAMATE 50 MG/1
50 TABLET, FILM COATED ORAL 2 TIMES DAILY
Qty: 60 TABLET | Refills: 1 | Status: SHIPPED | OUTPATIENT
Start: 2024-05-02

## 2024-05-02 RX ORDER — TIRZEPATIDE 5 MG/.5ML
5 INJECTION, SOLUTION SUBCUTANEOUS WEEKLY
Qty: 2 ML | Refills: 1 | Status: SHIPPED | OUTPATIENT
Start: 2024-05-02

## 2024-05-03 ENCOUNTER — PATIENT MESSAGE (OUTPATIENT)
Dept: FAMILY MEDICINE CLINIC | Facility: CLINIC | Age: 49
End: 2024-05-03

## 2024-05-03 DIAGNOSIS — T78.40XA ALLERGIC REACTION, INITIAL ENCOUNTER: Primary | ICD-10-CM

## 2024-05-04 RX ORDER — METHYLPREDNISOLONE 4 MG/1
TABLET ORAL
Qty: 21 EACH | Refills: 0 | Status: SHIPPED | OUTPATIENT
Start: 2024-05-04

## 2024-05-06 ENCOUNTER — PATIENT MESSAGE (OUTPATIENT)
Dept: FAMILY MEDICINE CLINIC | Facility: CLINIC | Age: 49
End: 2024-05-06

## 2024-05-06 DIAGNOSIS — T78.40XA ALLERGIC REACTION, INITIAL ENCOUNTER: ICD-10-CM

## 2024-05-06 DIAGNOSIS — L50.9 URTICARIA: Primary | ICD-10-CM

## 2024-05-08 ENCOUNTER — TELEMEDICINE (OUTPATIENT)
Dept: FAMILY MEDICINE CLINIC | Facility: CLINIC | Age: 49
End: 2024-05-08
Payer: COMMERCIAL

## 2024-05-08 VITALS
HEIGHT: 67 IN | HEART RATE: 86 BPM | SYSTOLIC BLOOD PRESSURE: 130 MMHG | BODY MASS INDEX: 35.63 KG/M2 | DIASTOLIC BLOOD PRESSURE: 88 MMHG | WEIGHT: 227 LBS

## 2024-05-08 DIAGNOSIS — L25.9 CONTACT DERMATITIS, UNSPECIFIED CONTACT DERMATITIS TYPE, UNSPECIFIED TRIGGER: ICD-10-CM

## 2024-05-08 DIAGNOSIS — T78.40XD ALLERGIC REACTION, SUBSEQUENT ENCOUNTER: Primary | ICD-10-CM

## 2024-05-08 DIAGNOSIS — I10 ESSENTIAL HYPERTENSION: ICD-10-CM

## 2024-05-08 DIAGNOSIS — E04.1 THYROID NODULE: ICD-10-CM

## 2024-05-08 PROCEDURE — 99213 OFFICE O/P EST LOW 20 MIN: CPT | Performed by: INTERNAL MEDICINE

## 2024-05-08 NOTE — PROGRESS NOTES
Virtual Regular Visit    Verification of patient location:    Patient is located at Home in the following state in which I hold an active license [place]      Assessment/Plan:    Problem List Items Addressed This Visit     Essential hypertension    Thyroid nodule    Relevant Orders    US guided thyroid biopsy with molecular testing   Other Visit Diagnoses     Allergic reaction, subsequent encounter    -  Primary    Relevant Medications    fluocinonide (LIDEX) 0.05 % cream    Contact dermatitis, unspecified contact dermatitis type, unspecified trigger        Relevant Medications    fluocinonide (LIDEX) 0.05 % cream                 Reason for visit is   Chief Complaint   Patient presents with   • Follow-up   • Virtual Regular Visit          Encounter provider Rosa Blankenship MD      Recent Visits  Date Type Provider Dept   05/08/24 Telemedicine Rosa Blankenship MD [facility]   Showing recent visits within past 7 days and meeting all other requirements  Future Appointments  No visits were found meeting these conditions.  Showing future appointments within next 150 days and meeting all other requirements       The patient was identified by name and date of birth. Ting Vasques was informed that this is a telemedicine visit and that the visit is being conducted through Telephone.  My office door was closed. No one else was in the room.  She acknowledged consent and understanding of privacy and security of the video platform. The patient has agreed to participate and understands they can discontinue the visit at any time.    Patient is aware this is a billable service.     Subjective  Ting Vasques is a 48 y.o. female  .      47yo female presents for allergic reaction. Reports that she had thyroid US a day before rash appeared. Started on right side with redness, itching. Spread to left side and upper neck. Tried benadryl and triamcinolone without much relief. Finishes medrol dose pack tomorrow. No  throat swelling, chest tightness or wheezing. Stopped topamax Saturday. Stopped phentermine over a week ago. Bp improved, now 120-130/70-80s. Diastolic no longer 90s.          Past Medical History:   Diagnosis Date   • Anemia 2011   • Chronic back pain     secondary to spinal stenosis   • Endometrial thickening on ultrasound    • Environmental allergies    • GERD (gastroesophageal reflux disease)    • Hypertension    • IUD (intrauterine device) in place     Mirena in place at this time per pt   • Migraine    • Obesity 1975   • Prediabetes    • Spinal stenosis        Past Surgical History:   Procedure Laterality Date   • BARIATRIC SURGERY  2011   • CHOLECYSTECTOMY     • COLONOSCOPY     • GASTRIC BYPASS  2011   • MASS EXCISION Left 9/1/2023    Procedure: EXCISION BIOPSY TISSUE LESION/MASS LOWER EXTREMITY/thigh;  Surgeon: Quentin Dhaliwal MD;  Location:  MAIN OR;  Service: General   • ID HYSTEROSCOPY BX ENDOMETRIUM&/POLYPC W/WO D&C N/A 1/18/2024    Procedure: HYSTEROSCOPY, (D&C) , POLYPECTOMY, PSB  MIRENA INSERTION;  Surgeon: Portia Barbosa MD;  Location: [facility];  Service: Gynecology   • ID LAPS TOTAL HYSTERECT 250 GM/< W/RMVL TUBE/OVARY N/A 3/22/2024    Procedure: (LTH) W/ ROBOTICS, BILATERAL SALPINGO-OOPHORECTOMY ;  (EUA);  Surgeon: Virginia Rincon MD;  Location: [facility];  Service: Gynecology Oncology       Current Outpatient Medications   Medication Sig Dispense Refill   • acetaminophen (TYLENOL) 500 mg tablet Take 500 mg by mouth every 6 (six) hours as needed for mild pain     • ascorbic acid (VITAMIN C) 250 mg tablet Take 1 tablet (250 mg total) by mouth daily With FeSO4 90 tablet 1   • azelastine (ASTELIN) 0.1 % nasal spray 2 sprays into each nostril 2 (two) times a day Use in each nostril as directed (Patient taking differently: 2 sprays into each nostril if needed Use in each nostril as directed) 30 mL 1   • cetirizine (ZyrTEC) 10 mg tablet Take 10 mg by mouth daily     • clobetasol  (TEMOVATE) 0.05 % cream Apply topically 2 (two) times a day 45 g 1   • dicyclomine (BENTYL) 10 mg capsule Take 1 capsule (10 mg total) by mouth 4 (four) times a day as needed (abdominal pain) 120 capsule 5   • DULoxetine (CYMBALTA) 60 mg delayed release capsule Take 2 capsules (120 mg total) by mouth daily 180 capsule 2   • esomeprazole (NexIUM) 20 mg capsule Take 1 capsule (20 mg total) by mouth daily in the early morning 90 capsule 2   • famotidine (PEPCID) 40 MG tablet Take 1 tablet (40 mg total) by mouth daily at bedtime as needed for heartburn (Patient taking differently: Take 40 mg by mouth daily at bedtime) 90 tablet 2   • fluocinonide (LIDEX) 0.05 % cream Apply topically 2 (two) times a day for 7 days 15 g 0   • folic acid (FOLVITE) 400 mcg tablet Take 1 tablet (400 mcg total) by mouth daily 90 tablet 0   • ibuprofen (MOTRIN) 200 mg tablet Take 600 mg by mouth every 6 (six) hours as needed for mild pain     • methylPREDNISolone 4 MG tablet therapy pack Use as directed on package 21 each 0   • metoprolol succinate (TOPROL-XL) 50 mg 24 hr tablet Take 1 tablet (50 mg total) by mouth daily (Patient taking differently: Take 50 mg by mouth daily at bedtime) 90 tablet 2   • montelukast (SINGULAIR) 10 mg tablet Take 1 tablet (10 mg total) by mouth daily at bedtime 90 tablet 2   • ondansetron (ZOFRAN) 8 mg tablet Take 1 tablet (8 mg total) by mouth every 8 (eight) hours as needed for nausea or vomiting 20 tablet 3   • tirzepatide (Zepbound) 5 mg/0.5 mL auto-injector Inject 0.5 mL (5 mg total) under the skin once a week 2 mL 1   • topiramate (Topamax) 50 MG tablet Take 1 tablet (50 mg total) by mouth 2 (two) times a day (Patient not taking: Reported on 5/8/2024) 60 tablet 1     Current Facility-Administered Medications   Medication Dose Route Frequency Provider Last Rate Last Admin   • cyanocobalamin injection 1,000 mcg  1,000 mcg Intramuscular Q30 Days Rosa Blankenship MD   1,000 mcg at 12/04/23 0040       "  Allergies   Allergen Reactions   • Bee Venom Hives   • Pollen Extract Allergic Rhinitis       Review of Systems   Constitutional:  Negative for appetite change, chills, fatigue and fever.   Skin:  Positive for color change and rash.       Video Exam    Vitals:    05/08/24 1127   BP: 130/88   Pulse: 86   Weight: 103 kg (227 lb)   Height: 5' 7\" (1.702 m)       Physical Exam  Vitals reviewed.   Constitutional:       General: She is not in acute distress.  Pulmonary:      Effort: No respiratory distress.   Neurological:      General: No focal deficit present.      Mental Status: She is alert.   Psychiatric:         Mood and Affect: Mood normal.         Behavior: Behavior normal.          Visit Time  Total Visit Duration: 15 minutes        "

## 2024-05-15 RX ORDER — HYDROXYZINE HYDROCHLORIDE 25 MG/1
25 TABLET, FILM COATED ORAL EVERY 6 HOURS PRN
Qty: 30 TABLET | Refills: 0 | Status: SHIPPED | OUTPATIENT
Start: 2024-05-15

## 2024-05-15 RX ORDER — METHYLPREDNISOLONE 4 MG/1
TABLET ORAL
Qty: 21 EACH | Refills: 0 | Status: SHIPPED | OUTPATIENT
Start: 2024-05-15

## 2024-05-15 NOTE — NURSING NOTE
Call placed to patient to discuss upcoming appointment at [facility] radiology department and complete consultation with patient. Patient is having a thyroid biopsy utilizing US  guidance. Reviewed patient's allergies, no current anticoagulant medication present per patient , also discussed the pre and post procedure expectations. Patient made aware of need for  post procedure if anti anxiety medication is taken. Also patient was made aware that the images of the study that was done at [facility] are needed the day of the procedure for comparison. Reminded patient of location and time expected for procedure, Patient expressed understanding by verbalizing and repeating instructions.

## 2024-05-17 ENCOUNTER — TELEPHONE (OUTPATIENT)
Age: 49
End: 2024-05-17

## 2024-05-17 NOTE — TELEPHONE ENCOUNTER
Pt reports her zeapbound is available for her to start taking. Pt wasn't sure if PCP wanted her to start taking it due to the fact she has thyroid nodule.   Please further advise patient @[phone] or [employer].

## 2024-05-18 ENCOUNTER — APPOINTMENT (OUTPATIENT)
Dept: LAB | Facility: CLINIC | Age: 49
End: 2024-05-18
Payer: COMMERCIAL

## 2024-05-18 DIAGNOSIS — Z80.49 FAMILY HISTORY OF MALIGNANT NEOPLASM OF GENITAL ORGAN: ICD-10-CM

## 2024-05-18 DIAGNOSIS — Z80.3 FAMILY HISTORY OF MALIGNANT NEOPLASM OF BREAST: ICD-10-CM

## 2024-05-18 DIAGNOSIS — L50.9 URTICARIA: ICD-10-CM

## 2024-05-18 DIAGNOSIS — E87.29 INCREASED ANION GAP METABOLIC ACIDOSIS: ICD-10-CM

## 2024-05-18 DIAGNOSIS — C54.1 ENDOMETRIAL SARCOMA (HCC): ICD-10-CM

## 2024-05-18 DIAGNOSIS — Z80.42 FAMILY HISTORY OF MALIGNANT NEOPLASM OF PROSTATE: ICD-10-CM

## 2024-05-18 DIAGNOSIS — T78.40XA ALLERGIC REACTION, INITIAL ENCOUNTER: ICD-10-CM

## 2024-05-18 LAB
25(OH)D3 SERPL-MCNC: 20.9 NG/ML (ref 30–100)
ALBUMIN SERPL BCP-MCNC: 4.2 G/DL (ref 3.5–5)
ALP SERPL-CCNC: 83 U/L (ref 34–104)
ALT SERPL W P-5'-P-CCNC: 32 U/L (ref 7–52)
ANION GAP SERPL CALCULATED.3IONS-SCNC: 10 MMOL/L (ref 4–13)
AST SERPL W P-5'-P-CCNC: 19 U/L (ref 13–39)
BASOPHILS # BLD AUTO: 0.05 THOUSANDS/ÂΜL (ref 0–0.1)
BASOPHILS NFR BLD AUTO: 1 % (ref 0–1)
BILIRUB SERPL-MCNC: 0.47 MG/DL (ref 0.2–1)
BUN SERPL-MCNC: 10 MG/DL (ref 5–25)
CALCIUM SERPL-MCNC: 9.2 MG/DL (ref 8.4–10.2)
CHLORIDE SERPL-SCNC: 105 MMOL/L (ref 96–108)
CHOLEST SERPL-MCNC: 196 MG/DL
CO2 SERPL-SCNC: 25 MMOL/L (ref 21–32)
CREAT SERPL-MCNC: 0.53 MG/DL (ref 0.6–1.3)
CRP SERPL QL: 4.4 MG/L
EOSINOPHIL # BLD AUTO: 0.28 THOUSAND/ÂΜL (ref 0–0.61)
EOSINOPHIL NFR BLD AUTO: 5 % (ref 0–6)
ERYTHROCYTE [DISTWIDTH] IN BLOOD BY AUTOMATED COUNT: 14.6 % (ref 11.6–15.1)
ERYTHROCYTE [SEDIMENTATION RATE] IN BLOOD: 21 MM/HOUR (ref 0–19)
EST. AVERAGE GLUCOSE BLD GHB EST-MCNC: 126 MG/DL
GFR SERPL CREATININE-BSD FRML MDRD: 112 ML/MIN/1.73SQ M
GLUCOSE P FAST SERPL-MCNC: 119 MG/DL (ref 65–99)
HBA1C MFR BLD: 6 %
HCT VFR BLD AUTO: 43 % (ref 34.8–46.1)
HDLC SERPL-MCNC: 69 MG/DL
HGB BLD-MCNC: 13.8 G/DL (ref 11.5–15.4)
IMM GRANULOCYTES # BLD AUTO: 0.02 THOUSAND/UL (ref 0–0.2)
IMM GRANULOCYTES NFR BLD AUTO: 0 % (ref 0–2)
LDLC SERPL CALC-MCNC: 101 MG/DL (ref 0–100)
LYMPHOCYTES # BLD AUTO: 2.18 THOUSANDS/ÂΜL (ref 0.6–4.47)
LYMPHOCYTES NFR BLD AUTO: 36 % (ref 14–44)
MCH RBC QN AUTO: 26.9 PG (ref 26.8–34.3)
MCHC RBC AUTO-ENTMCNC: 32.1 G/DL (ref 31.4–37.4)
MCV RBC AUTO: 84 FL (ref 82–98)
MONOCYTES # BLD AUTO: 0.42 THOUSAND/ÂΜL (ref 0.17–1.22)
MONOCYTES NFR BLD AUTO: 7 % (ref 4–12)
NEUTROPHILS # BLD AUTO: 3.18 THOUSANDS/ÂΜL (ref 1.85–7.62)
NEUTS SEG NFR BLD AUTO: 51 % (ref 43–75)
NRBC BLD AUTO-RTO: 0 /100 WBCS
PLATELET # BLD AUTO: 286 THOUSANDS/UL (ref 149–390)
PMV BLD AUTO: 10.7 FL (ref 8.9–12.7)
POTASSIUM SERPL-SCNC: 4 MMOL/L (ref 3.5–5.3)
PROT SERPL-MCNC: 6.4 G/DL (ref 6.4–8.4)
RBC # BLD AUTO: 5.13 MILLION/UL (ref 3.81–5.12)
SODIUM SERPL-SCNC: 140 MMOL/L (ref 135–147)
TRIGL SERPL-MCNC: 130 MG/DL
TSH SERPL DL<=0.05 MIU/L-ACNC: 0.98 UIU/ML (ref 0.45–4.5)
VIT B12 SERPL-MCNC: 179 PG/ML (ref 180–914)
WBC # BLD AUTO: 6.13 THOUSAND/UL (ref 4.31–10.16)

## 2024-05-18 PROCEDURE — 84443 ASSAY THYROID STIM HORMONE: CPT

## 2024-05-18 PROCEDURE — 85652 RBC SED RATE AUTOMATED: CPT

## 2024-05-18 PROCEDURE — 82607 VITAMIN B-12: CPT

## 2024-05-18 PROCEDURE — 86200 CCP ANTIBODY: CPT

## 2024-05-18 PROCEDURE — 80061 LIPID PANEL: CPT

## 2024-05-18 PROCEDURE — 86430 RHEUMATOID FACTOR TEST QUAL: CPT

## 2024-05-18 PROCEDURE — 86140 C-REACTIVE PROTEIN: CPT

## 2024-05-18 PROCEDURE — 85025 COMPLETE CBC W/AUTO DIFF WBC: CPT

## 2024-05-18 PROCEDURE — 82306 VITAMIN D 25 HYDROXY: CPT

## 2024-05-18 PROCEDURE — 86038 ANTINUCLEAR ANTIBODIES: CPT

## 2024-05-18 PROCEDURE — 80053 COMPREHEN METABOLIC PANEL: CPT

## 2024-05-18 PROCEDURE — 83036 HEMOGLOBIN GLYCOSYLATED A1C: CPT

## 2024-05-19 LAB
ANA SER QL IA: NEGATIVE
RHEUMATOID FACT SER QL LA: NEGATIVE

## 2024-05-21 ENCOUNTER — PATIENT MESSAGE (OUTPATIENT)
Dept: FAMILY MEDICINE CLINIC | Facility: CLINIC | Age: 49
End: 2024-05-21

## 2024-05-21 DIAGNOSIS — K21.9 GASTROESOPHAGEAL REFLUX DISEASE, UNSPECIFIED WHETHER ESOPHAGITIS PRESENT: ICD-10-CM

## 2024-05-21 DIAGNOSIS — J30.89 NON-SEASONAL ALLERGIC RHINITIS, UNSPECIFIED TRIGGER: ICD-10-CM

## 2024-05-21 DIAGNOSIS — I10 ESSENTIAL HYPERTENSION: ICD-10-CM

## 2024-05-21 DIAGNOSIS — Z00.00 ANNUAL PHYSICAL EXAM: Primary | ICD-10-CM

## 2024-05-21 DIAGNOSIS — R10.12 LEFT UPPER QUADRANT ABDOMINAL PAIN: ICD-10-CM

## 2024-05-21 DIAGNOSIS — E53.8 B12 DEFICIENCY: ICD-10-CM

## 2024-05-21 LAB — CCP AB SER IA-ACNC: 0.6

## 2024-05-21 RX ORDER — FAMOTIDINE 40 MG/1
40 TABLET, FILM COATED ORAL
Qty: 90 TABLET | Refills: 1 | Status: SHIPPED | OUTPATIENT
Start: 2024-05-21

## 2024-05-21 RX ORDER — MONTELUKAST SODIUM 10 MG/1
10 TABLET ORAL
Qty: 90 TABLET | Refills: 2 | Status: SHIPPED | OUTPATIENT
Start: 2024-05-21

## 2024-05-21 RX ORDER — METOPROLOL SUCCINATE 50 MG/1
50 TABLET, EXTENDED RELEASE ORAL
Qty: 90 TABLET | Refills: 2 | Status: SHIPPED | OUTPATIENT
Start: 2024-05-21

## 2024-05-21 RX ORDER — ONDANSETRON HYDROCHLORIDE 8 MG/1
8 TABLET, FILM COATED ORAL EVERY 8 HOURS PRN
Qty: 20 TABLET | Refills: 3 | Status: SHIPPED | OUTPATIENT
Start: 2024-05-21

## 2024-05-22 RX ORDER — CYANOCOBALAMIN 1000 UG/ML
1000 INJECTION, SOLUTION INTRAMUSCULAR; SUBCUTANEOUS
Qty: 10 ML | Refills: 1 | Status: SHIPPED | OUTPATIENT
Start: 2024-05-22

## 2024-05-23 ENCOUNTER — PATIENT MESSAGE (OUTPATIENT)
Dept: FAMILY MEDICINE CLINIC | Facility: CLINIC | Age: 49
End: 2024-05-23

## 2024-05-23 ENCOUNTER — ANESTHESIA (OUTPATIENT)
Dept: GASTROENTEROLOGY | Facility: HOSPITAL | Age: 49
End: 2024-05-23

## 2024-05-23 ENCOUNTER — ANESTHESIA EVENT (OUTPATIENT)
Dept: GASTROENTEROLOGY | Facility: HOSPITAL | Age: 49
End: 2024-05-23

## 2024-05-23 ENCOUNTER — HOSPITAL ENCOUNTER (OUTPATIENT)
Dept: GASTROENTEROLOGY | Facility: HOSPITAL | Age: 49
Setting detail: OUTPATIENT SURGERY
End: 2024-05-23
Attending: INTERNAL MEDICINE
Payer: COMMERCIAL

## 2024-05-23 VITALS
DIASTOLIC BLOOD PRESSURE: 69 MMHG | OXYGEN SATURATION: 100 % | RESPIRATION RATE: 16 BRPM | HEART RATE: 72 BPM | TEMPERATURE: 97.2 F | SYSTOLIC BLOOD PRESSURE: 118 MMHG

## 2024-05-23 DIAGNOSIS — N95.1 VASOMOTOR SYMPTOMS DUE TO MENOPAUSE: Primary | ICD-10-CM

## 2024-05-23 DIAGNOSIS — F41.9 ANXIETY: ICD-10-CM

## 2024-05-23 DIAGNOSIS — Z12.11 SCREENING FOR COLON CANCER: ICD-10-CM

## 2024-05-23 DIAGNOSIS — M54.50 CHRONIC LOW BACK PAIN, UNSPECIFIED BACK PAIN LATERALITY, UNSPECIFIED WHETHER SCIATICA PRESENT: ICD-10-CM

## 2024-05-23 DIAGNOSIS — G89.29 CHRONIC LOW BACK PAIN, UNSPECIFIED BACK PAIN LATERALITY, UNSPECIFIED WHETHER SCIATICA PRESENT: ICD-10-CM

## 2024-05-23 PROCEDURE — 88305 TISSUE EXAM BY PATHOLOGIST: CPT | Performed by: SPECIALIST

## 2024-05-23 PROCEDURE — 45385 COLONOSCOPY W/LESION REMOVAL: CPT | Performed by: STUDENT IN AN ORGANIZED HEALTH CARE EDUCATION/TRAINING PROGRAM

## 2024-05-23 RX ORDER — LIDOCAINE HYDROCHLORIDE 10 MG/ML
INJECTION, SOLUTION EPIDURAL; INFILTRATION; INTRACAUDAL; PERINEURAL AS NEEDED
Status: DISCONTINUED | OUTPATIENT
Start: 2024-05-23 | End: 2024-05-23

## 2024-05-23 RX ORDER — PROPOFOL 10 MG/ML
INJECTION, EMULSION INTRAVENOUS AS NEEDED
Status: DISCONTINUED | OUTPATIENT
Start: 2024-05-23 | End: 2024-05-23

## 2024-05-23 RX ORDER — SODIUM CHLORIDE, SODIUM LACTATE, POTASSIUM CHLORIDE, CALCIUM CHLORIDE 600; 310; 30; 20 MG/100ML; MG/100ML; MG/100ML; MG/100ML
125 INJECTION, SOLUTION INTRAVENOUS CONTINUOUS
Status: DISCONTINUED | OUTPATIENT
Start: 2024-05-23 | End: 2024-05-27 | Stop reason: HOSPADM

## 2024-05-23 RX ORDER — SODIUM CHLORIDE, SODIUM LACTATE, POTASSIUM CHLORIDE, CALCIUM CHLORIDE 600; 310; 30; 20 MG/100ML; MG/100ML; MG/100ML; MG/100ML
INJECTION, SOLUTION INTRAVENOUS CONTINUOUS PRN
Status: DISCONTINUED | OUTPATIENT
Start: 2024-05-23 | End: 2024-05-23

## 2024-05-23 RX ADMIN — PROPOFOL 140 MCG/KG/MIN: 10 INJECTION, EMULSION INTRAVENOUS at 09:10

## 2024-05-23 RX ADMIN — PROPOFOL 100 MG: 10 INJECTION, EMULSION INTRAVENOUS at 09:05

## 2024-05-23 RX ADMIN — LIDOCAINE HYDROCHLORIDE 50 MG: 10 INJECTION, SOLUTION EPIDURAL; INFILTRATION; INTRACAUDAL; PERINEURAL at 09:05

## 2024-05-23 RX ADMIN — SODIUM CHLORIDE, SODIUM LACTATE, POTASSIUM CHLORIDE, AND CALCIUM CHLORIDE: .6; .31; .03; .02 INJECTION, SOLUTION INTRAVENOUS at 09:03

## 2024-05-23 RX ADMIN — SODIUM CHLORIDE, SODIUM LACTATE, POTASSIUM CHLORIDE, AND CALCIUM CHLORIDE 125 ML/HR: .6; .31; .03; .02 INJECTION, SOLUTION INTRAVENOUS at 07:44

## 2024-05-23 RX ADMIN — PROPOFOL 50 MG: 10 INJECTION, EMULSION INTRAVENOUS at 09:06

## 2024-05-23 RX ADMIN — PROPOFOL 50 MG: 10 INJECTION, EMULSION INTRAVENOUS at 09:09

## 2024-05-23 NOTE — H&P
History and Physical - [facility]  Ting Vasques 48 y.o. female MRN: [id number]                  HPI: Ting Vasques is a 48 y.o. year old female who presents for colon cancer screening      REVIEW OF SYSTEMS: Per the HPI, and otherwise unremarkable.    Historical Information   Past Medical History:   Diagnosis Date    Anemia 2011    Chronic back pain     secondary to spinal stenosis    Endometrial thickening on ultrasound     Environmental allergies     GERD (gastroesophageal reflux disease)     Hypertension     IUD (intrauterine device) in place     Mirena in place at this time per pt    Migraine     Obesity 1975    Prediabetes     Spinal stenosis      Past Surgical History:   Procedure Laterality Date    BARIATRIC SURGERY  2011    CHOLECYSTECTOMY      COLONOSCOPY      GASTRIC BYPASS  2011    MASS EXCISION Left 9/1/2023    Procedure: EXCISION BIOPSY TISSUE LESION/MASS LOWER EXTREMITY/thigh;  Surgeon: Quentin Dhaliwal MD;  Location:  MAIN OR;  Service: General    ND HYSTEROSCOPY BX ENDOMETRIUM&/POLYPC W/WO D&C N/A 1/18/2024    Procedure: HYSTEROSCOPY, (D&C) , POLYPECTOMY, PSB  MIRENA INSERTION;  Surgeon: Portia Barbosa MD;  [facility];  Service: Gynecology    ND LAPS TOTAL HYSTERECT 250 GM/< W/RMVL TUBE/OVARY N/A 3/22/2024    Procedure: (LTH) W/ ROBOTICS, BILATERAL SALPINGO-OOPHORECTOMY ;  (EUA);  Surgeon: Virginia Rincon MD;  Location[facility];  Service: Gynecology Oncology     Social History   Social History     Substance and Sexual Activity   Alcohol Use Yes    Comment: socially - couple times per year     Social History     Substance and Sexual Activity   Drug Use No    Comment: Denies any drug use per pt     Social History     Tobacco Use   Smoking Status Never   Smokeless Tobacco Never   Tobacco Comments    Never a smoker or use of any tobacco products per pt      Family History   Problem Relation Age of Onset    Diabetes Mother     Hypertension Mother      Endometrial cancer Mother 74    Cancer Mother         Endometrial    Diabetes Father     Hypertension Father     Heart disease Father     Prostate cancer Father     Cancer Father         Prostate    Hypertension Sister     No Known Problems Sister     Hypertension Maternal Grandmother     Heart disease Maternal Grandmother     Diabetes Maternal Grandmother     No Known Problems Maternal Grandfather     Breast cancer additional onset Paternal Grandmother 70    Breast cancer Paternal Grandmother 66    Cancer Paternal Grandmother         Laryngeal and breast    Prostate cancer Paternal Grandfather     Bone cancer Paternal Grandfather     Cancer Paternal Grandfather         Prostate    Breast cancer Paternal Aunt 68    Anesthesia problems Neg Hx        Meds/Allergies       Current Outpatient Medications:     acetaminophen (TYLENOL) 500 mg tablet    ascorbic acid (VITAMIN C) 250 mg tablet    cetirizine (ZyrTEC) 10 mg tablet    cyanocobalamin 1,000 mcg/mL    DULoxetine (CYMBALTA) 60 mg delayed release capsule    esomeprazole (NexIUM) 20 mg capsule    famotidine (PEPCID) 40 MG tablet    folic acid (FOLVITE) 400 mcg tablet    hydrOXYzine HCL (ATARAX) 25 mg tablet    ibuprofen (MOTRIN) 200 mg tablet    methylPREDNISolone 4 MG tablet therapy pack    metoprolol succinate (TOPROL-XL) 50 mg 24 hr tablet    montelukast (SINGULAIR) 10 mg tablet    ondansetron (ZOFRAN) 8 mg tablet    azelastine (ASTELIN) 0.1 % nasal spray    clobetasol (TEMOVATE) 0.05 % cream    dicyclomine (BENTYL) 10 mg capsule    fluocinonide (LIDEX) 0.05 % cream    Syringe 1 ML MISC    tirzepatide (Zepbound) 5 mg/0.5 mL auto-injector    Current Facility-Administered Medications:     lactated ringers infusion, 125 mL/hr, Intravenous, Continuous, 125 mL/hr at 05/23/24 0744    Allergies   Allergen Reactions    Bee Venom Hives    Pollen Extract Allergic Rhinitis       Objective     /63   Pulse 72   Temp 98.8 °F (37.1 °C) (Temporal)   Resp 18   LMP  03/14/2024 Comment: per pt has MIRENA in place  SpO2 96%       PHYSICAL EXAM    Gen: NAD  Head: NCAT  CV: RRR  CHEST: Clear  ABD: soft, NT/ND  EXT: no edema      ASSESSMENT/PLAN:  This is a 48 y.o. year old female here for colonoscopy, and she is stable and optimized for her procedure.

## 2024-05-23 NOTE — ANESTHESIA POSTPROCEDURE EVALUATION
Post-Op Assessment Note    CV Status:  Stable    Pain management: satisfactory to patient       Mental Status:  Alert and awake   Hydration Status:  Euvolemic   PONV Controlled:  Controlled   Airway Patency:  Patent     Post Op Vitals Reviewed: Yes    No anethesia notable event occurred.    Staff: CRNA               BP   100/69   Temp   97.9   Pulse  65   Resp   17   SpO2   100

## 2024-05-23 NOTE — ANESTHESIA PREPROCEDURE EVALUATION
Procedure:  COLONOSCOPY    Currently on Medrol for face/neck rash and pruritus with unclear etiology. No known drug allergies    Relevant Problems   ANESTHESIA (within normal limits)      CARDIO   (+) Essential hypertension      ENDO (within normal limits)      GI/HEPATIC   (+) Gastroesophageal reflux disease      GYN   (+) Endometrial cancer (HCC)      MUSCULOSKELETAL (within normal limits)      NEURO/PSYCH   (+) Anxiety      PULMONARY (within normal limits)      Obstetrics/Gynecology   (+) H/O total hysterectomy with bilateral salpingo-oophorectomy (BSO)        Physical Exam    Airway    Mallampati score: II  TM Distance: >3 FB  Neck ROM: full     Dental        Cardiovascular  Cardiovascular exam normal    Pulmonary  Pulmonary exam normal     Other Findings  Very mild erythema of face/neck regionpost-pubertal.      Anesthesia Plan  ASA Score- 3     Anesthesia Type- IV sedation with anesthesia with ASA Monitors.         Additional Monitors:     Airway Plan:            Plan Factors-Exercise tolerance (METS): >4 METS.    Chart reviewed. EKG reviewed. Imaging results reviewed. Existing labs reviewed. Patient summary reviewed.    Patient is not a current smoker.              Induction- intravenous.    Postoperative Plan-         Informed Consent- Anesthetic plan and risks discussed with patient.  I personally reviewed this patient with the CRNA. Discussed and agreed on the Anesthesia Plan with the CRNA..

## 2024-05-24 PROCEDURE — 88305 TISSUE EXAM BY PATHOLOGIST: CPT | Performed by: SPECIALIST

## 2024-05-28 RX ORDER — VENLAFAXINE HYDROCHLORIDE 37.5 MG/1
37.5 CAPSULE, EXTENDED RELEASE ORAL
Qty: 30 CAPSULE | Refills: 1 | Status: SHIPPED | OUTPATIENT
Start: 2024-05-28 | End: 2024-06-05

## 2024-05-28 RX ORDER — DULOXETIN HYDROCHLORIDE 60 MG/1
60 CAPSULE, DELAYED RELEASE ORAL DAILY
Start: 2024-05-28 | End: 2024-06-05

## 2024-05-30 ENCOUNTER — HOSPITAL ENCOUNTER (OUTPATIENT)
Dept: RADIOLOGY | Facility: HOSPITAL | Age: 49
Discharge: HOME | End: 2024-05-30
Attending: INTERNAL MEDICINE
Payer: COMMERCIAL

## 2024-05-30 DIAGNOSIS — E04.1 THYROID NODULE: ICD-10-CM

## 2024-05-30 PROCEDURE — 10005 FNA BX W/US GDN 1ST LES: CPT

## 2024-05-30 PROCEDURE — 88173 CYTOPATH EVAL FNA REPORT: CPT | Performed by: PATHOLOGY

## 2024-05-30 RX ORDER — LIDOCAINE HYDROCHLORIDE 10 MG/ML
5 INJECTION, SOLUTION EPIDURAL; INFILTRATION; INTRACAUDAL; PERINEURAL ONCE
Status: COMPLETED | OUTPATIENT
Start: 2024-05-30 | End: 2024-05-30

## 2024-05-30 RX ADMIN — LIDOCAINE HYDROCHLORIDE 5 ML: 10 INJECTION, SOLUTION EPIDURAL; INFILTRATION; INTRACAUDAL; PERINEURAL at 09:35

## 2024-06-04 PROCEDURE — 88173 CYTOPATH EVAL FNA REPORT: CPT | Performed by: PATHOLOGY

## 2024-06-05 ENCOUNTER — OFFICE VISIT (OUTPATIENT)
Dept: FAMILY MEDICINE CLINIC | Facility: CLINIC | Age: 49
End: 2024-06-05
Payer: COMMERCIAL

## 2024-06-05 VITALS
HEART RATE: 83 BPM | WEIGHT: 225 LBS | SYSTOLIC BLOOD PRESSURE: 118 MMHG | DIASTOLIC BLOOD PRESSURE: 74 MMHG | RESPIRATION RATE: 18 BRPM | OXYGEN SATURATION: 98 % | BODY MASS INDEX: 35.31 KG/M2 | HEIGHT: 67 IN | TEMPERATURE: 97.7 F

## 2024-06-05 DIAGNOSIS — E55.9 VITAMIN D DEFICIENCY: ICD-10-CM

## 2024-06-05 DIAGNOSIS — E04.1 THYROID NODULE: Primary | ICD-10-CM

## 2024-06-05 DIAGNOSIS — G89.29 CHRONIC LOW BACK PAIN, UNSPECIFIED BACK PAIN LATERALITY, UNSPECIFIED WHETHER SCIATICA PRESENT: ICD-10-CM

## 2024-06-05 DIAGNOSIS — E53.8 B12 DEFICIENCY: ICD-10-CM

## 2024-06-05 DIAGNOSIS — N95.1 VASOMOTOR SYMPTOMS DUE TO MENOPAUSE: ICD-10-CM

## 2024-06-05 DIAGNOSIS — G47.33 OSA (OBSTRUCTIVE SLEEP APNEA): ICD-10-CM

## 2024-06-05 DIAGNOSIS — R73.03 PREDIABETES: ICD-10-CM

## 2024-06-05 DIAGNOSIS — Z98.84 HISTORY OF ROUX-EN-Y GASTRIC BYPASS: ICD-10-CM

## 2024-06-05 DIAGNOSIS — F41.9 ANXIETY: ICD-10-CM

## 2024-06-05 DIAGNOSIS — C54.1 ENDOMETRIAL CANCER (HCC): ICD-10-CM

## 2024-06-05 DIAGNOSIS — T78.40XD ALLERGIC REACTION, SUBSEQUENT ENCOUNTER: ICD-10-CM

## 2024-06-05 DIAGNOSIS — M54.50 CHRONIC LOW BACK PAIN, UNSPECIFIED BACK PAIN LATERALITY, UNSPECIFIED WHETHER SCIATICA PRESENT: ICD-10-CM

## 2024-06-05 DIAGNOSIS — Z12.31 ENCOUNTER FOR SCREENING MAMMOGRAM FOR MALIGNANT NEOPLASM OF BREAST: ICD-10-CM

## 2024-06-05 DIAGNOSIS — E66.01 CLASS 3 SEVERE OBESITY DUE TO EXCESS CALORIES WITH SERIOUS COMORBIDITY AND BODY MASS INDEX (BMI) OF 40.0 TO 44.9 IN ADULT (HCC): ICD-10-CM

## 2024-06-05 PROBLEM — Z30.430 ENCOUNTER FOR INSERTION OF MIRENA IUD: Status: RESOLVED | Noted: 2024-01-18 | Resolved: 2024-06-05

## 2024-06-05 PROCEDURE — 99214 OFFICE O/P EST MOD 30 MIN: CPT | Performed by: INTERNAL MEDICINE

## 2024-06-05 RX ORDER — TIRZEPATIDE 5 MG/.5ML
5 INJECTION, SOLUTION SUBCUTANEOUS WEEKLY
Qty: 2 ML | Refills: 1 | Status: SHIPPED | OUTPATIENT
Start: 2024-06-05 | End: 2024-06-10

## 2024-06-05 RX ORDER — VENLAFAXINE HYDROCHLORIDE 37.5 MG/1
37.5 CAPSULE, EXTENDED RELEASE ORAL
Start: 2024-06-05

## 2024-06-05 NOTE — PATIENT INSTRUCTIONS
Please contact your dermatologist about rash and itching  Repeat thyroid US around October/November  Mammogram due in August

## 2024-06-05 NOTE — PROGRESS NOTES
Ambulatory Visit  Name: Ting Vasques      : 1975      MRN: [id number]  Encounter Provider: Rosa Blankenship MD  Encounter Date: 2024   Encounter department: [facility]    Assessment & Plan   1. Thyroid nodule  -     US thyroid; Future; Expected date: 10/01/2024  -     TSH, 3rd generation with Free T4 reflex; Future; Expected date: 2024  2. Encounter for screening mammogram for malignant neoplasm of breast  -     Mammo screening bilateral w 3d & cad; Future  3. Endometrial cancer (HCC)  -     Comprehensive metabolic panel; Future; Expected date: 2024  -     CBC and differential; Future; Expected date: 2024  4. Allergic reaction, subsequent encounter  5. Chronic low back pain, unspecified back pain laterality, unspecified whether sciatica present  -     venlafaxine (EFFEXOR-XR) 37.5 mg 24 hr capsule; Take 1 capsule (37.5 mg total) by mouth daily with breakfast  6. Vasomotor symptoms due to menopause  -     venlafaxine (EFFEXOR-XR) 37.5 mg 24 hr capsule; Take 1 capsule (37.5 mg total) by mouth daily with breakfast  7. Anxiety  -     venlafaxine (EFFEXOR-XR) 37.5 mg 24 hr capsule; Take 1 capsule (37.5 mg total) by mouth daily with breakfast  8. B12 deficiency  -     Vitamin B12; Future; Expected date: 2024  9. Prediabetes  -     Hemoglobin A1C; Future; Expected date: 2024  -     tirzepatide (Mounjaro) 7.5 MG/0.5ML; Inject 0.5 mL (7.5 mg total) under the skin every 7 days  10. Class 3 severe obesity due to excess calories with serious comorbidity and body mass index (BMI) of 40.0 to 44.9 in adult (HCC)  -     Comprehensive metabolic panel; Future; Expected date: 2024  -     Hemoglobin A1C; Future; Expected date: 2024  -     Lipid Panel with Direct LDL reflex; Future; Expected date: 2024  -     tirzepatide (Mounjaro) 7.5 MG/0.5ML; Inject 0.5 mL (7.5 mg total) under the skin every 7 days  11. Vitamin D deficiency  -     Vitamin D 25  hydroxy; Future; Expected date: 12/05/2024  12. History of Toni-en-Y gastric bypass  -     Comprehensive metabolic panel; Future; Expected date: 12/05/2024  -     Vitamin B12; Future; Expected date: 12/05/2024  -     Vitamin D 25 hydroxy; Future; Expected date: 12/05/2024  -     Iron Panel (Includes Ferritin, Iron Sat%, Iron, and TIBC); Future; Expected date: 12/05/2024  -     CBC and differential; Future; Expected date: 12/05/2024  -     tirzepatide (Mounjaro) 7.5 MG/0.5ML; Inject 0.5 mL (7.5 mg total) under the skin every 7 days  13. CHRISTIANO (obstructive sleep apnea)  -     tirzepatide (Mounjaro) 7.5 MG/0.5ML; Inject 0.5 mL (7.5 mg total) under the skin every 7 days       History of Present Illness     47yo female with history of HTN, anxiety, thyroid nodule here for 6 month follow up. Recently underwent JEAN-PAUL-BSO and pathology showed stage 1 carcinoma without invasion. She was referred to genetic counselor.     Rash on upper neck, chest began about a month ago after thyroid US. Tried steroids, creams and antihistamines. Uncertain if related to any hot flashes. Didn't  Effexor yet. Denies lip, tongue or throat swelling.    Thyroid FNA showed benign follicular cells.     Stopped phentermine due to BP elevation, stopped topamax due to rash. Would like to resume Zepbound. Weight down about 25 lbs since February.         Review of Systems   Constitutional:  Positive for fatigue. Negative for chills and fever.   Respiratory:  Negative for chest tightness and wheezing.    Musculoskeletal:  Positive for back pain.   Skin:  Positive for rash.   Psychiatric/Behavioral:  Positive for dysphoric mood and sleep disturbance. The patient is nervous/anxious.      Current Outpatient Medications on File Prior to Visit   Medication Sig Dispense Refill   • acetaminophen (TYLENOL) 500 mg tablet Take 500 mg by mouth every 6 (six) hours as needed for mild pain     • ascorbic acid (VITAMIN C) 250 mg tablet Take 1 tablet (250 mg total)  by mouth daily With FeSO4 90 tablet 1   • cetirizine (ZyrTEC) 10 mg tablet Take 10 mg by mouth daily     • clobetasol (TEMOVATE) 0.05 % cream Apply topically 2 (two) times a day 45 g 1   • cyanocobalamin 1,000 mcg/mL Inject 1 mL (1,000 mcg total) into a muscle every 30 (thirty) days 10 mL 1   • dicyclomine (BENTYL) 10 mg capsule Take 1 capsule (10 mg total) by mouth 4 (four) times a day as needed (abdominal pain) 120 capsule 5   • esomeprazole (NexIUM) 20 mg capsule Take 1 capsule (20 mg total) by mouth daily in the early morning 90 capsule 2   • famotidine (PEPCID) 40 MG tablet Take 1 tablet (40 mg total) by mouth daily at bedtime as needed for indigestion or heartburn 90 tablet 1   • folic acid (FOLVITE) 400 mcg tablet Take 1 tablet (400 mcg total) by mouth daily 90 tablet 0   • hydrOXYzine HCL (ATARAX) 25 mg tablet Take 1 tablet (25 mg total) by mouth every 6 (six) hours as needed for itching or allergies 30 tablet 0   • ibuprofen (MOTRIN) 200 mg tablet Take 600 mg by mouth every 6 (six) hours as needed for mild pain     • metoprolol succinate (TOPROL-XL) 50 mg 24 hr tablet Take 1 tablet (50 mg total) by mouth daily at bedtime 90 tablet 2   • montelukast (SINGULAIR) 10 mg tablet Take 1 tablet (10 mg total) by mouth daily at bedtime 90 tablet 2   • ondansetron (ZOFRAN) 8 mg tablet Take 1 tablet (8 mg total) by mouth every 8 (eight) hours as needed for nausea or vomiting 20 tablet 3   • Syringe 1 ML MISC Use every 30 (thirty) days 25 each 0   • azelastine (ASTELIN) 0.1 % nasal spray 2 sprays into each nostril 2 (two) times a day Use in each nostril as directed (Patient not taking: Reported on 6/5/2024) 30 mL 1   • fluocinonide (LIDEX) 0.05 % cream Apply topically 2 (two) times a day for 7 days 15 g 0   • [DISCONTINUED] metFORMIN (GLUCOPHAGE) 500 mg tablet Take 1 tablet (500 mg total) by mouth daily with dinner 30 tablet 3     No current facility-administered medications on file prior to visit.      Objective     BP  "118/74   Pulse 83   Temp 97.7 °F (36.5 °C)   Resp 18   Ht 5' 7\" (1.702 m)   Wt 102 kg (225 lb)   LMP 03/14/2024 Comment: per pt has MIRENA in place  SpO2 98%   BMI 35.24 kg/m²     Physical Exam  Vitals reviewed.   Constitutional:       General: She is not in acute distress.     Appearance: She is obese.   Cardiovascular:      Rate and Rhythm: Normal rate and regular rhythm.      Heart sounds: No murmur heard.     No friction rub. No gallop.   Pulmonary:      Effort: Pulmonary effort is normal. No respiratory distress.      Breath sounds: No wheezing, rhonchi or rales.   Skin:     Findings: Erythema and rash present. Rash is not crusting, scaling, urticarial or vesicular.      Comments: Erythematous patches on upper chest and neck, francesca with pressure   Neurological:      General: No focal deficit present.      Mental Status: She is alert.   Psychiatric:         Mood and Affect: Mood and affect normal.         Behavior: Behavior normal. Behavior is cooperative.       Administrative Statements           "

## 2024-06-11 ENCOUNTER — PREP FOR PROCEDURE (OUTPATIENT)
Age: 49
End: 2024-06-11

## 2024-06-11 ENCOUNTER — TELEPHONE (OUTPATIENT)
Dept: FAMILY MEDICINE CLINIC | Facility: CLINIC | Age: 49
End: 2024-06-11

## 2024-06-11 ENCOUNTER — TELEPHONE (OUTPATIENT)
Age: 49
End: 2024-06-11

## 2024-06-11 DIAGNOSIS — E66.01 CLASS 3 SEVERE OBESITY DUE TO EXCESS CALORIES WITH SERIOUS COMORBIDITY AND BODY MASS INDEX (BMI) OF 40.0 TO 44.9 IN ADULT (HCC): ICD-10-CM

## 2024-06-11 DIAGNOSIS — Z86.010 HISTORY OF COLON POLYPS: Primary | ICD-10-CM

## 2024-06-11 DIAGNOSIS — G47.33 OSA (OBSTRUCTIVE SLEEP APNEA): ICD-10-CM

## 2024-06-11 DIAGNOSIS — R73.03 PREDIABETES: Primary | ICD-10-CM

## 2024-06-11 RX ORDER — TIRZEPATIDE 7.5 MG/.5ML
7.5 INJECTION, SOLUTION SUBCUTANEOUS WEEKLY
Qty: 2 ML | Refills: 0 | Status: SHIPPED | OUTPATIENT
Start: 2024-06-11 | End: 2024-06-17

## 2024-06-11 RX ORDER — TIRZEPATIDE 5 MG/.5ML
5 INJECTION, SOLUTION SUBCUTANEOUS WEEKLY
Qty: 2 ML | Refills: 3 | Status: CANCELLED | OUTPATIENT
Start: 2024-06-11

## 2024-06-11 NOTE — TELEPHONE ENCOUNTER
Scheduled date of colonoscopy (as of today):  11/11/24    Physician performing colonoscopy: Dr. Calvin  Location of colonoscopy:  [place]    Bowel prep reviewed with patient:    [email or web address]

## 2024-06-11 NOTE — TELEPHONE ENCOUNTER
06/11/24  Screened by: Destinee Simons    Referring Provider Dr. Blankenship    Pre- Screening:     There is no height or weight on file to calculate BMI.35.24  Has patient been referred for a routine screening Colonoscopy? yes  Is the patient between 45-75 years old? yes      Previous Colonoscopy yes   If yes:    Date: 2024    Facility:     Reason:         Does the patient want to see a Gastroenterologist prior to their procedure OR are they having any GI symptoms? yes    Has the patient been hospitalized or had abdominal surgery in the past 6 months? no    Does the patient use supplemental oxygen? no    Does the patient take Coumadin, Lovenox, Plavix, Elliquis, Xarelto, or other blood thinning medication? no    Has the patient had a stroke, cardiac event, or stent placed in the past year? no        If patient is between 45yrs - 49yrs, please advise patient that we will have to confirm benefits & coverage with their insurance company for a routine screening colonoscopy.

## 2024-06-11 NOTE — TELEPHONE ENCOUNTER
Patient would like to stay on Zepbound due to other medication needing a prior auth and already having an auth done on the zepbound.

## 2024-06-11 NOTE — TELEPHONE ENCOUNTER
Pt has been on zepbound previously and just had dose increased at last visit and looks like mounjaro was sent in instead of zepbound.

## 2024-06-14 ENCOUNTER — TELEPHONE (OUTPATIENT)
Dept: GENETICS | Facility: CLINIC | Age: 49
End: 2024-06-14

## 2024-06-14 NOTE — TELEPHONE ENCOUNTER
Post-Test Genetic Counseling Consult Note  Today I left a voicemail for Ting reviewing the results of her genetic test for hereditary cancer. We met previously on 4/22/24 for pre-test counseling.  I encouraged Ting to call [phone] to discuss this result further.  A copy of this consult note and genetic test result will be shared with the patient.      SUMMARY:    Test(s): CustomNext: Cancer® +RNAinsight® (59 genes): APC, RYAN, AXIN2, BAP1, BARD1, BMPR1A, BRCA1, BRCA2, BRIP1, CDH1, CDK4, CDKN1B, CDKN2A, CHEK2, CTNNA1, DICER1, EGLN1, EPCAM, FH, FLCN, GREM1, HOXB13, KIF1B, KIT, MAX, MEN1, MET, MITF, MLH1, MSH2, MSH3, MSH6, MUTYH, NF1, NTHL1, PALB2, PDGFRA PMS2, POLD1, POLE, POT1, PTEN, RAD51C, RAD51D, RB1, RET, SDHA, SDHAF2, SDHB, SDHC, SDHD, SMAD4, SMARCA4, STK11, VULD021, TP53, TSC1, TSC2, VHL       Result: Variant of uncertain significance     FH c.668A>T (p.K223I); heterozygous; uncertain significance     Assessment:  A variant of uncertain significance (VUS) means that a change was identified in a specific gene but it cannot be determined whether the variant is associated with an increased risk of cancer or is a harmless genetic change. The significance of the FH variant is currently not known and therefore this test result cannot be used to help determine Ting cancer risks.      It is possible that the variant was seen in only a handful of individuals, or there may be conflicting or incomplete information in the medical literature about the variant and its association with hereditary cancer.     The laboratory will continue to accumulate information on this variant and will reclassify it as either a positive or negative genetic test result when they are confident that they have adequate information. As updated information is obtained, we will notify Ting with the updated information. It is important to note that the majority of variants of uncertain significance are reclassified as likely benign or  benign as additional information about the variant becomes available.     Risk Based on Family History:  The significance of this variant is currently not known and therefore this test result cannot be used to help determine Ting cancer risks. Rather her personal medical history and family history of cancer are the most important factors used to estimate her risk for developing certain cancers and to direct her medical management.     Using Tyrer-Cuzick v8 model, we estimated that Ting's lifetime risk of developing breast cancer with inclusion of breast density is 10.8% when calculated with competing mortality and 13.0% when calculated without competing mortality and does not exceed the threshold at this time for consideration of modified medical management. This calculation is based on genetic testing completed at [facility] that did not find a pathogenic variant in BRCA1/2.     Risks and Testing for Family Members:  Genetic testing for this variant is not recommended for relatives who wish to determine their cancer risks for purposes of determining medical management. The presence or absence of this variant in a relative is not clinically meaningful unless the variant is reclassified in the future.     If Ting has any affected family members with a cancer diagnosis, especially at a young age, they may still consider genetic testing. Relatives who wish to pursue genetic testing can reach out to the [facility] [phone] to schedule an appointment or visit [email or web address] to identify a local genetic counselor.     Plan:   There are no additional recommendations based on Ting's result. she should continue cancer screening and medical management as clinically indicated and as determined appropriate by her healthcare providers.    VUS Result: Ting was strongly encouraged to contact us regarding any changes in her personal or family history of cancer as these changes could alter our  recommendation regarding genetic testing and/or cancer screening. Ting was also encouraged to follow up with us on an annual basis as variant classifications are subject to change.

## 2024-06-17 DIAGNOSIS — E66.01 CLASS 3 SEVERE OBESITY DUE TO EXCESS CALORIES WITH SERIOUS COMORBIDITY AND BODY MASS INDEX (BMI) OF 40.0 TO 44.9 IN ADULT (HCC): ICD-10-CM

## 2024-06-17 DIAGNOSIS — G47.33 OSA (OBSTRUCTIVE SLEEP APNEA): ICD-10-CM

## 2024-06-17 DIAGNOSIS — R73.03 PREDIABETES: Primary | ICD-10-CM

## 2024-06-17 RX ORDER — TIRZEPATIDE 5 MG/.5ML
5 INJECTION, SOLUTION SUBCUTANEOUS WEEKLY
Qty: 2 ML | Refills: 0 | Status: SHIPPED | OUTPATIENT
Start: 2024-06-17

## 2024-06-19 ENCOUNTER — TELEPHONE (OUTPATIENT)
Dept: GENETICS | Facility: CLINIC | Age: 49
End: 2024-06-19

## 2024-06-19 NOTE — TELEPHONE ENCOUNTER
Ting called our office today asking to further discuss her genetic test result.  I returned her phone call and reviewed her genetic test result.  See telephone note dated 6/14/24 for further details.  All of Ting's questions were answered and she was encouraged to call our office with additional questions.  Her results were shared with her on [facility] on 6/14/24.

## 2024-06-21 ENCOUNTER — LAB REQUISITION (OUTPATIENT)
Dept: LAB | Facility: HOSPITAL | Age: 49
End: 2024-06-21

## 2024-06-21 DIAGNOSIS — C54.1 MALIGNANT NEOPLASM OF ENDOMETRIUM (HCC): ICD-10-CM

## 2024-06-21 LAB — SCAN RESULT: NORMAL

## 2024-06-26 ENCOUNTER — NURSE TRIAGE (OUTPATIENT)
Dept: FAMILY MEDICINE CLINIC | Facility: CLINIC | Age: 49
End: 2024-06-26

## 2024-06-26 NOTE — PATIENT COMMUNICATION
"Called pt to gather further information on her s/s.  Pt states she takes Nexium and the last time she had experienced this that's when she was changed over from Protonix to Nexium.  Pt confirms it is a chest tightness and feels like something is \"sitting on her chest.\"      Pt states she started Zepbound again around 2 weeks ago and is unsure if it related.  She states there was some Tums there in her office and her chest tightness did improve some after taking the Tums.  Pt states this is the first time she tried taking Tums for s/s.    Pt just checked BP and it was 122/70.  Pt denies HA, SOB, N/V.  Please review and advise how pt should proceed.      1. LOCATION: \"Where does it hurt?\"        Mid sternum    2. RADIATION: \"Does the pain go anywhere else?\" (e.g., into neck, jaw, arms, back)      Denies    3. ONSET: \"When did the chest pain begin?\" (Minutes, hours or days)       Started about 2 weeks ago.    4. PATTERN \"Does the pain come and go, or has it been constant since it started?\"  \"Does it get worse with exertion?\"       Improves some after eating.  Depends on what pt eats.      5. DURATION: \"How long does it last\" (e.g., seconds, minutes, hours)      Constant    6. SEVERITY: \"How bad is the pain?\"  (e.g., Scale 1-10; mild, moderate, or severe)     - MILD (1-3): doesn't interfere with normal activities      - MODERATE (4-7): interferes with normal activities or awakens from sleep     - SEVERE (8-10): excruciating pain, unable to do any normal activities        5-6/10    7. CARDIAC RISK FACTORS: \"Do you have any history of heart problems or risk factors for heart disease?\" (e.g., angina, prior heart attack; diabetes, high blood pressure, high cholesterol, smoker, or strong family history of heart disease)      High blood pressure, pt's parents have htn, pt is prediabetic    8. PULMONARY RISK FACTORS: \"Do you have any history of lung disease?\"  (e.g., blood clots in lung, asthma, emphysema, birth control " "pills)      Denies    9. CAUSE: \"What do you think is causing the chest pain?\"      Pt thinks it is GERD.    10. OTHER SYMPTOMS: \"Do you have any other symptoms?\" (e.g., dizziness, nausea, vomiting, sweating, fever, difficulty breathing, cough)        Denies    11. PREGNANCY: \"Is there any chance you are pregnant?\" \"When was your last menstrual period?\"        Denies  "

## 2024-06-26 NOTE — TELEPHONE ENCOUNTER
"Reason for Disposition  • Patient says chest pain feels exactly the same as previously diagnosed 'heartburn' and describes burning in chest and accompanying sour taste in mouth    Answer Assessment - Initial Assessment Questions  1. LOCATION: \"Where does it hurt?\"        Mid sternum    2. RADIATION: \"Does the pain go anywhere else?\" (e.g., into neck, jaw, arms, back)      Denies    3. ONSET: \"When did the chest pain begin?\" (Minutes, hours or days)       Started about 2 weeks ago.    4. PATTERN \"Does the pain come and go, or has it been constant since it started?\"  \"Does it get worse with exertion?\"       Improves some after eating.  Depends on what pt eats.      5. DURATION: \"How long does it last\" (e.g., seconds, minutes, hours)      Constant    6. SEVERITY: \"How bad is the pain?\"  (e.g., Scale 1-10; mild, moderate, or severe)     - MILD (1-3): doesn't interfere with normal activities      - MODERATE (4-7): interferes with normal activities or awakens from sleep     - SEVERE (8-10): excruciating pain, unable to do any normal activities        5-6/10    7. CARDIAC RISK FACTORS: \"Do you have any history of heart problems or risk factors for heart disease?\" (e.g., angina, prior heart attack; diabetes, high blood pressure, high cholesterol, smoker, or strong family history of heart disease)      High blood pressure, pt's parents have htn, pt is prediabetic    8. PULMONARY RISK FACTORS: \"Do you have any history of lung disease?\"  (e.g., blood clots in lung, asthma, emphysema, birth control pills)      Denies    9. CAUSE: \"What do you think is causing the chest pain?\"      Pt thinks it is GERD.    10. OTHER SYMPTOMS: \"Do you have any other symptoms?\" (e.g., dizziness, nausea, vomiting, sweating, fever, difficulty breathing, cough)        Denies    11. PREGNANCY: \"Is there any chance you are pregnant?\" \"When was your last menstrual period?\"        Denies    Protocols used: Chest Pain-ADULT-[place]    "

## 2024-06-27 ENCOUNTER — PATIENT MESSAGE (OUTPATIENT)
Dept: FAMILY MEDICINE CLINIC | Facility: CLINIC | Age: 49
End: 2024-06-27

## 2024-06-27 DIAGNOSIS — K21.9 GASTROESOPHAGEAL REFLUX DISEASE, UNSPECIFIED WHETHER ESOPHAGITIS PRESENT: Primary | ICD-10-CM

## 2024-06-28 RX ORDER — SUCRALFATE ORAL 1 G/10ML
1 SUSPENSION ORAL
Qty: 414 ML | Refills: 1 | Status: SHIPPED | OUTPATIENT
Start: 2024-06-28

## 2024-07-09 DIAGNOSIS — F41.9 ANXIETY: ICD-10-CM

## 2024-07-09 DIAGNOSIS — G89.29 CHRONIC LOW BACK PAIN, UNSPECIFIED BACK PAIN LATERALITY, UNSPECIFIED WHETHER SCIATICA PRESENT: ICD-10-CM

## 2024-07-09 DIAGNOSIS — G47.33 OSA (OBSTRUCTIVE SLEEP APNEA): ICD-10-CM

## 2024-07-09 DIAGNOSIS — E66.01 CLASS 3 SEVERE OBESITY DUE TO EXCESS CALORIES WITH SERIOUS COMORBIDITY AND BODY MASS INDEX (BMI) OF 40.0 TO 44.9 IN ADULT (HCC): ICD-10-CM

## 2024-07-09 DIAGNOSIS — M54.50 CHRONIC LOW BACK PAIN, UNSPECIFIED BACK PAIN LATERALITY, UNSPECIFIED WHETHER SCIATICA PRESENT: ICD-10-CM

## 2024-07-09 DIAGNOSIS — R73.03 PREDIABETES: ICD-10-CM

## 2024-07-09 DIAGNOSIS — N95.1 VASOMOTOR SYMPTOMS DUE TO MENOPAUSE: ICD-10-CM

## 2024-07-09 RX ORDER — TIRZEPATIDE 5 MG/.5ML
5 INJECTION, SOLUTION SUBCUTANEOUS WEEKLY
Qty: 2 ML | Refills: 0 | Status: SHIPPED | OUTPATIENT
Start: 2024-07-09 | End: 2024-07-12 | Stop reason: SDUPTHER

## 2024-07-09 RX ORDER — VENLAFAXINE HYDROCHLORIDE 37.5 MG/1
37.5 CAPSULE, EXTENDED RELEASE ORAL
Status: CANCELLED | OUTPATIENT
Start: 2024-07-09

## 2024-07-09 RX ORDER — VENLAFAXINE HYDROCHLORIDE 75 MG/1
75 CAPSULE, EXTENDED RELEASE ORAL
Qty: 90 CAPSULE | Refills: 0 | Status: SHIPPED | OUTPATIENT
Start: 2024-07-09

## 2024-07-12 DIAGNOSIS — E66.01 CLASS 3 SEVERE OBESITY DUE TO EXCESS CALORIES WITH SERIOUS COMORBIDITY AND BODY MASS INDEX (BMI) OF 40.0 TO 44.9 IN ADULT (HCC): ICD-10-CM

## 2024-07-12 DIAGNOSIS — G47.33 OSA (OBSTRUCTIVE SLEEP APNEA): ICD-10-CM

## 2024-07-12 DIAGNOSIS — R73.03 PREDIABETES: ICD-10-CM

## 2024-07-12 RX ORDER — TIRZEPATIDE 5 MG/.5ML
5 INJECTION, SOLUTION SUBCUTANEOUS WEEKLY
Qty: 2 ML | Refills: 0 | Status: SHIPPED | OUTPATIENT
Start: 2024-07-12 | End: 2024-07-15 | Stop reason: SDUPTHER

## 2024-07-12 NOTE — TELEPHONE ENCOUNTER
PHARMACY CHANGE - 3 month script      Medication: tirzepatide (Zepbound) 5 mg/0.5 mL auto-injector    Dose/Frequency: Inject 0.5 mL (5 mg total) under the skin once a week     Quantity: 2 mL     Pharmacy: [facility]    Office:   [x] PCP/Provider -   [] Speciality/Provider -     Does the patient have enough for 3 days?   [x] Yes   [] No - Send as HP to POD

## 2024-07-15 ENCOUNTER — PATIENT MESSAGE (OUTPATIENT)
Dept: FAMILY MEDICINE CLINIC | Facility: CLINIC | Age: 49
End: 2024-07-15

## 2024-07-15 DIAGNOSIS — G47.33 OSA (OBSTRUCTIVE SLEEP APNEA): ICD-10-CM

## 2024-07-15 DIAGNOSIS — R73.03 PREDIABETES: ICD-10-CM

## 2024-07-15 DIAGNOSIS — E66.01 CLASS 3 SEVERE OBESITY DUE TO EXCESS CALORIES WITH SERIOUS COMORBIDITY AND BODY MASS INDEX (BMI) OF 40.0 TO 44.9 IN ADULT (HCC): ICD-10-CM

## 2024-07-15 RX ORDER — TIRZEPATIDE 5 MG/.5ML
5 INJECTION, SOLUTION SUBCUTANEOUS WEEKLY
Qty: 6 ML | Refills: 0 | Status: SHIPPED | OUTPATIENT
Start: 2024-07-15

## 2024-07-17 PROBLEM — Z08 ENCOUNTER FOR FOLLOW-UP SURVEILLANCE OF ENDOMETRIAL CANCER: Status: ACTIVE | Noted: 2024-07-17

## 2024-07-17 PROBLEM — Z85.42 ENCOUNTER FOR FOLLOW-UP SURVEILLANCE OF ENDOMETRIAL CANCER: Status: ACTIVE | Noted: 2024-07-17

## 2024-07-17 NOTE — PROGRESS NOTES
Assessment/Plan:    Problem List Items Addressed This Visit       Encounter for follow-up surveillance of endometrial cancer     49yo withHTN, obesity with h/o elin-en-y and h/o stage IA endometrial adenocarcinoma, figo grade 1 s/p RA-TLH/BSO presents for follow up.    Clinically SETH  Exam with likely granulation tissue at apex removed entirely and sent to pathology    Continue with q3-6 month surveillance visits or sooner with symptoms.                         CHIEF COMPLAINT: follow up       Problem:   Cancer Staging   No matching staging information was found for the patient.        Previous therapy:  Oncology History   Endometrial cancer (HCC)   10/24/2023 Initial Diagnosis    Endometrial cancer (HCC)     5/23/2024 -  Cancer Staged    Staging form: Corpus Uteri - Carcinoma, AJCC 8th Edition  - Pathologic stage from 5/23/2024: FIGO Stage IA, calculated as Stage Unknown (pT1a, pNX, cM0) - Signed by Virginia Rincon MD on 7/17/2024  Stage prefix: Initial diagnosis  Histologic grade (G): G1  Histologic grading system: 3 grade system       5/23/2024 Surgery    RA-TLH/BSO    MMRp     6/14/2024 Genetic Testing    [facility]           Patient ID: Ting Vasques is a 48 y.o. female  49yo withHTN, obesity with h/o elin-en-y and h/o stage IA endometrial adenocarcinoma and stage IA endometrioid ovarian borderline tumor, figo grade 1 s/p RA-TLH/BSO presents for follow up. Pt denies abdominal bloating/pain/cramping. No vaginal bleeding/discharge. No changes in BM/urination. No early satiety/Appetite adequate. Denies significant weight loss/weight gain.             The following portions of the patient's history were reviewed and updated as appropriate: allergies, current medications, past family history, past medical history, past social history, past surgical history, and problem list.    Review of Systems   Constitutional: Negative.    HENT: Negative.     Eyes: Negative.    Respiratory: Negative.     Cardiovascular: Negative.     Gastrointestinal: Negative.    Endocrine: Negative.    Genitourinary: Negative.    Musculoskeletal: Negative.    Neurological: Negative.    Psychiatric/Behavioral: Negative.         Current Outpatient Medications   Medication Sig Dispense Refill    acetaminophen (TYLENOL) 500 mg tablet Take 500 mg by mouth every 6 (six) hours as needed for mild pain      ascorbic acid (VITAMIN C) 250 mg tablet Take 1 tablet (250 mg total) by mouth daily With FeSO4 90 tablet 1    cetirizine (ZyrTEC) 10 mg tablet Take 10 mg by mouth daily      clobetasol (TEMOVATE) 0.05 % cream Apply topically 2 (two) times a day 45 g 1    dicyclomine (BENTYL) 10 mg capsule Take 1 capsule (10 mg total) by mouth 4 (four) times a day as needed (abdominal pain) 120 capsule 5    esomeprazole (NexIUM) 20 mg capsule Take 1 capsule (20 mg total) by mouth daily in the early morning 90 capsule 2    famotidine (PEPCID) 40 MG tablet Take 1 tablet (40 mg total) by mouth daily at bedtime as needed for indigestion or heartburn 90 tablet 1    folic acid (FOLVITE) 400 mcg tablet Take 1 tablet (400 mcg total) by mouth daily 90 tablet 0    hydrOXYzine HCL (ATARAX) 25 mg tablet Take 1 tablet (25 mg total) by mouth every 6 (six) hours as needed for itching or allergies 30 tablet 0    ibuprofen (MOTRIN) 200 mg tablet Take 600 mg by mouth every 6 (six) hours as needed for mild pain      metoprolol succinate (TOPROL-XL) 50 mg 24 hr tablet Take 1 tablet (50 mg total) by mouth daily at bedtime 90 tablet 2    montelukast (SINGULAIR) 10 mg tablet Take 1 tablet (10 mg total) by mouth daily at bedtime 90 tablet 2    ondansetron (ZOFRAN) 8 mg tablet Take 1 tablet (8 mg total) by mouth every 8 (eight) hours as needed for nausea or vomiting 20 tablet 3    sucralfate (CARAFATE) 1 g/10 mL suspension Take 10 mL (1 g total) by mouth 4 (four) times a day (with meals and at bedtime) (Patient taking differently: Take 1 g by mouth if needed) 414 mL 1    Syringe 1 ML MISC Use every 30  "(thirty) days 25 each 0    tirzepatide (Zepbound) 5 mg/0.5 mL auto-injector Inject 0.5 mL (5 mg total) under the skin once a week 6 mL 0    venlafaxine (EFFEXOR-XR) 75 mg 24 hr capsule Take 1 capsule (75 mg total) by mouth daily with breakfast 90 capsule 0    azelastine (ASTELIN) 0.1 % nasal spray 2 sprays into each nostril 2 (two) times a day Use in each nostril as directed (Patient not taking: Reported on 6/5/2024) 30 mL 1    cyanocobalamin 1,000 mcg/mL Inject 1 mL (1,000 mcg total) into a muscle every 30 (thirty) days 10 mL 1    fluocinonide (LIDEX) 0.05 % cream Apply topically 2 (two) times a day for 7 days (Patient not taking: Reported on 7/19/2024) 15 g 0     No current facility-administered medications for this visit.           Objective:    Blood pressure 114/78, resp. rate 16, height 5' 7\" (1.702 m), weight 100 kg (221 lb), last menstrual period 03/14/2024, not currently breastfeeding.  Body mass index is 34.61 kg/m².  Body surface area is 2.11 meters squared.    Physical Exam  HENT:      Head: Normocephalic and atraumatic.   Cardiovascular:      Rate and Rhythm: Normal rate and regular rhythm.   Pulmonary:      Effort: Pulmonary effort is normal.   Abdominal:      General: There is no distension.      Palpations: Abdomen is soft. There is no mass.   Genitourinary:     Comments: Chaperone present for exam.  The external female genitalia is normal. The bartholin's, uretheral and skenes glands are normal. The urethral meatus is normal (midline with no lesions). Anus without fissure or lesion. Speculum exam reveals a grossly normal vagina cuff with small area of granulation tissue at left corner. No masses, lesions,discharge or bleeding. No significant cystocele or rectocele noted. Bimanual exam notes a surgical absent cervix, uterus and adnexal structures. No masses or fullness. Bladder is without fullness, mass or tenderness.    Musculoskeletal:         General: Normal range of motion.      Cervical back: " "Normal range of motion.   Skin:     General: Skin is warm and dry.   Neurological:      Mental Status: She is alert and oriented to person, place, and time.           Lab Results   Component Value Date     11/24/2015    K 4.0 05/18/2024     05/18/2024    CO2 25 05/18/2024    ANIONGAP 10 11/24/2015    BUN 10 05/18/2024    CREATININE 0.53 (L) 05/18/2024    GLUCOSE 96 11/24/2015    GLUF 119 (H) 05/18/2024    CALCIUM 9.2 05/18/2024    AST 19 05/18/2024    ALT 32 05/18/2024    ALKPHOS 83 05/18/2024    PROT 7.5 11/24/2015    BILITOT 0.62 11/24/2015    EGFR 112 05/18/2024     Lab Results   Component Value Date    WBC 6.13 05/18/2024    HGB 13.8 05/18/2024    HCT 43.0 05/18/2024    MCV 84 05/18/2024     05/18/2024     Lab Results   Component Value Date    NEUTROABS 3.18 05/18/2024        Trend:  No results found for: \"\"     Lesion Biopsy    Date/Time: 7/19/2024 8:00 AM    Performed by: Virginia Rincon MD  Authorized by: Virginia Rincon MD  Universal Protocol:  Consent: Verbal consent obtained.  Risks and benefits: risks, benefits and alternatives were discussed  Consent given by: patient  Patient understanding: patient states understanding of the procedure being performed  Patient identity confirmed: verbally with patient    Procedure Details - Lesion Biopsy:     Body area:  Anogenital    Vaginal Lesion: Yes      Biopsy tissue type: mucous membrane    Malignancy: malignancy unknown       Entire lesion removed with single bite. Hemostasis with silver nitrate       "

## 2024-07-18 ENCOUNTER — PATIENT MESSAGE (OUTPATIENT)
Dept: FAMILY MEDICINE CLINIC | Facility: CLINIC | Age: 49
End: 2024-07-18

## 2024-07-18 DIAGNOSIS — E66.01 CLASS 3 SEVERE OBESITY DUE TO EXCESS CALORIES WITH SERIOUS COMORBIDITY AND BODY MASS INDEX (BMI) OF 40.0 TO 44.9 IN ADULT (HCC): ICD-10-CM

## 2024-07-18 DIAGNOSIS — R73.03 PREDIABETES: ICD-10-CM

## 2024-07-18 DIAGNOSIS — I10 ESSENTIAL HYPERTENSION: Primary | ICD-10-CM

## 2024-07-18 DIAGNOSIS — R06.02 SHORTNESS OF BREATH: ICD-10-CM

## 2024-07-19 ENCOUNTER — OFFICE VISIT (OUTPATIENT)
Dept: GYNECOLOGIC ONCOLOGY | Facility: CLINIC | Age: 49
End: 2024-07-19
Payer: COMMERCIAL

## 2024-07-19 VITALS
RESPIRATION RATE: 16 BRPM | BODY MASS INDEX: 34.69 KG/M2 | HEIGHT: 67 IN | SYSTOLIC BLOOD PRESSURE: 114 MMHG | WEIGHT: 221 LBS | DIASTOLIC BLOOD PRESSURE: 78 MMHG

## 2024-07-19 DIAGNOSIS — Z08 ENCOUNTER FOR FOLLOW-UP SURVEILLANCE OF ENDOMETRIAL CANCER: ICD-10-CM

## 2024-07-19 DIAGNOSIS — Z85.42 ENCOUNTER FOR FOLLOW-UP SURVEILLANCE OF ENDOMETRIAL CANCER: ICD-10-CM

## 2024-07-19 PROCEDURE — 99213 OFFICE O/P EST LOW 20 MIN: CPT | Performed by: OBSTETRICS & GYNECOLOGY

## 2024-07-19 PROCEDURE — 88342 IMHCHEM/IMCYTCHM 1ST ANTB: CPT | Performed by: PATHOLOGY

## 2024-07-19 PROCEDURE — 88305 TISSUE EXAM BY PATHOLOGIST: CPT | Performed by: PATHOLOGY

## 2024-07-19 PROCEDURE — 88344 IMHCHEM/IMCYTCHM EA MLT ANTB: CPT | Performed by: PATHOLOGY

## 2024-07-24 PROCEDURE — 88305 TISSUE EXAM BY PATHOLOGIST: CPT | Performed by: PATHOLOGY

## 2024-07-24 PROCEDURE — 88342 IMHCHEM/IMCYTCHM 1ST ANTB: CPT | Performed by: PATHOLOGY

## 2024-07-24 PROCEDURE — 88344 IMHCHEM/IMCYTCHM EA MLT ANTB: CPT | Performed by: PATHOLOGY

## 2024-08-01 ENCOUNTER — TELEPHONE (OUTPATIENT)
Age: 49
End: 2024-08-01

## 2024-08-01 NOTE — TELEPHONE ENCOUNTER
Pt called back stating she is unsure which appointment she will be keeping. She is waiting to hear back from her insurance company this afternoon and will cancel one of the appointments at that time.

## 2024-08-01 NOTE — TELEPHONE ENCOUNTER
[facility] scheduling called stating the patient has an appt with them for an Echo, however she also has an appt with [facility] for an Echo. [facility] said they cannot contact the patient to see which appt she is keeping and asking if the office can contact the patient and [facility]  [phone]

## 2024-08-02 DIAGNOSIS — Z98.84 HISTORY OF ROUX-EN-Y GASTRIC BYPASS: ICD-10-CM

## 2024-08-02 DIAGNOSIS — K21.9 GASTROESOPHAGEAL REFLUX DISEASE, UNSPECIFIED WHETHER ESOPHAGITIS PRESENT: ICD-10-CM

## 2024-08-02 DIAGNOSIS — R10.12 LEFT UPPER QUADRANT ABDOMINAL PAIN: ICD-10-CM

## 2024-08-06 ENCOUNTER — PATIENT MESSAGE (OUTPATIENT)
Dept: FAMILY MEDICINE CLINIC | Facility: CLINIC | Age: 49
End: 2024-08-06

## 2024-08-06 ENCOUNTER — HOSPITAL ENCOUNTER (OUTPATIENT)
Dept: NON INVASIVE DIAGNOSTICS | Facility: CLINIC | Age: 49
Discharge: HOME | End: 2024-08-06

## 2024-08-06 DIAGNOSIS — G47.33 OSA (OBSTRUCTIVE SLEEP APNEA): ICD-10-CM

## 2024-08-06 DIAGNOSIS — K21.9 GASTROESOPHAGEAL REFLUX DISEASE, UNSPECIFIED WHETHER ESOPHAGITIS PRESENT: Primary | ICD-10-CM

## 2024-08-06 DIAGNOSIS — E66.01 CLASS 3 SEVERE OBESITY DUE TO EXCESS CALORIES WITH SERIOUS COMORBIDITY AND BODY MASS INDEX (BMI) OF 40.0 TO 44.9 IN ADULT (HCC): ICD-10-CM

## 2024-08-06 DIAGNOSIS — R73.03 PREDIABETES: ICD-10-CM

## 2024-08-06 DIAGNOSIS — R09.A2 GLOBUS SENSATION: ICD-10-CM

## 2024-08-06 NOTE — TELEPHONE ENCOUNTER
Please offer her one of my same-days first so I can check her neck and throat and we can decide if she needs to see specialist.

## 2024-08-07 ENCOUNTER — OFFICE VISIT (OUTPATIENT)
Dept: FAMILY MEDICINE CLINIC | Facility: CLINIC | Age: 49
End: 2024-08-07
Payer: COMMERCIAL

## 2024-08-07 ENCOUNTER — TELEPHONE (OUTPATIENT)
Dept: FAMILY MEDICINE CLINIC | Facility: CLINIC | Age: 49
End: 2024-08-07

## 2024-08-07 VITALS
HEART RATE: 72 BPM | TEMPERATURE: 97.7 F | RESPIRATION RATE: 16 BRPM | SYSTOLIC BLOOD PRESSURE: 110 MMHG | DIASTOLIC BLOOD PRESSURE: 70 MMHG | HEIGHT: 67 IN | OXYGEN SATURATION: 99 % | BODY MASS INDEX: 32.9 KG/M2 | WEIGHT: 209.6 LBS

## 2024-08-07 DIAGNOSIS — J30.89 SEASONAL ALLERGIC RHINITIS DUE TO OTHER ALLERGIC TRIGGER: ICD-10-CM

## 2024-08-07 DIAGNOSIS — J02.9 SORE THROAT: ICD-10-CM

## 2024-08-07 DIAGNOSIS — M48.061 SPINAL STENOSIS OF LUMBAR REGION WITHOUT NEUROGENIC CLAUDICATION: ICD-10-CM

## 2024-08-07 DIAGNOSIS — R09.A2 GLOBUS SENSATION: Primary | ICD-10-CM

## 2024-08-07 PROCEDURE — 99213 OFFICE O/P EST LOW 20 MIN: CPT | Performed by: INTERNAL MEDICINE

## 2024-08-07 RX ORDER — AZELASTINE 1 MG/ML
2 SPRAY, METERED NASAL 2 TIMES DAILY
Qty: 30 ML | Refills: 1 | Status: SHIPPED | OUTPATIENT
Start: 2024-08-07

## 2024-08-07 RX ORDER — LEVOCETIRIZINE DIHYDROCHLORIDE 5 MG/1
5 TABLET, FILM COATED ORAL EVERY EVENING
Qty: 90 TABLET | Refills: 1 | Status: SHIPPED | OUTPATIENT
Start: 2024-08-07

## 2024-08-07 RX ORDER — DULOXETIN HYDROCHLORIDE 30 MG/1
30 CAPSULE, DELAYED RELEASE ORAL DAILY
Qty: 90 CAPSULE | Refills: 1 | Status: SHIPPED | OUTPATIENT
Start: 2024-08-07

## 2024-08-07 NOTE — PATIENT INSTRUCTIONS
Please try azelastine nasal spray and xyzal for globus sensation   Send me a message in 2-3 weeks with update , if not improved can switch to PPI to prilosec

## 2024-08-07 NOTE — TELEPHONE ENCOUNTER
PA needs to be renewed for Zepbound 2.5/0.5, initiated by pharmacy on CoverMyMeds.      Key: [id number]

## 2024-08-07 NOTE — PROGRESS NOTES
"Ambulatory Visit  Name: Ting Vasques      : 1975      MRN: [id number]  Encounter Provider: Rosa Blankenship MD  Encounter Date: 2024   Encounter department: [facility]    Assessment & Plan   1. Globus sensation  -     levocetirizine (XYZAL) 5 MG tablet; Take 1 tablet (5 mg total) by mouth every evening  2. Sore throat  3. Seasonal allergic rhinitis due to other allergic trigger  -     azelastine (ASTELIN) 0.1 % nasal spray; 2 sprays into each nostril 2 (two) times a day Use in each nostril as directed  -     levocetirizine (XYZAL) 5 MG tablet; Take 1 tablet (5 mg total) by mouth every evening  4. Spinal stenosis of lumbar region without neurogenic claudication  -     DULoxetine (CYMBALTA) 30 mg delayed release capsule; Take 1 capsule (30 mg total) by mouth daily       History of Present Illness     47yo female here for acute visit due to globus sensation, sore throat. Had similar problem a few years ago and saw ENT. Started flonase with some relief. Takes zyrtec daily for allergies, singulair at bedtime. Nexium in the morning for GERD, and pepcid at bedtime. Denies dysphagia for pills or solid foods.     Wants to switch back to duloxetine from effexor, pain is not well-controlled with latter.         Review of Systems   Constitutional:  Positive for fatigue. Negative for appetite change, chills and fever.   HENT:  Positive for congestion, postnasal drip, sore throat and trouble swallowing.    Respiratory:  Negative for cough.    Skin:  Positive for rash (from zepbound injections).       Objective     /70   Pulse 72   Temp 97.7 °F (36.5 °C) (Temporal)   Resp 16   Ht 5' 7\" (1.702 m)   Wt 95.1 kg (209 lb 9.6 oz)   LMP 2024 Comment: per pt has MIRENA in place  SpO2 99%   BMI 32.83 kg/m²     Physical Exam  Vitals reviewed.   Constitutional:       General: She is not in acute distress.     Appearance: She is normal weight.   HENT:      Head: Normocephalic and " atraumatic.      Nose:      Right Turbinates: Pale.      Left Turbinates: Pale.      Mouth/Throat:      Pharynx: No pharyngeal swelling, oropharyngeal exudate, posterior oropharyngeal erythema or uvula swelling.      Tonsils: No tonsillar exudate.   Neck:      Thyroid: No thyromegaly.   Pulmonary:      Effort: Pulmonary effort is normal. No respiratory distress.   Lymphadenopathy:      Cervical: No cervical adenopathy.   Skin:     Findings: Rash present.   Neurological:      General: No focal deficit present.      Mental Status: She is alert and oriented to person, place, and time.   Psychiatric:         Mood and Affect: Mood normal.         Behavior: Behavior normal.       Administrative Statements

## 2024-08-20 ENCOUNTER — CLINICAL SUPPORT (OUTPATIENT)
Dept: URGENT CARE | Facility: MEDICAL CENTER | Age: 49
End: 2024-08-20

## 2024-08-20 ENCOUNTER — OCCMED (OUTPATIENT)
Dept: URGENT CARE | Facility: MEDICAL CENTER | Age: 49
End: 2024-08-20

## 2024-08-20 ENCOUNTER — PATIENT MESSAGE (OUTPATIENT)
Dept: FAMILY MEDICINE CLINIC | Facility: CLINIC | Age: 49
End: 2024-08-20

## 2024-08-20 DIAGNOSIS — E66.01 CLASS 3 SEVERE OBESITY DUE TO EXCESS CALORIES WITH SERIOUS COMORBIDITY AND BODY MASS INDEX (BMI) OF 40.0 TO 44.9 IN ADULT (HCC): ICD-10-CM

## 2024-08-20 DIAGNOSIS — R73.03 PREDIABETES: ICD-10-CM

## 2024-08-20 DIAGNOSIS — Z02.1 ENCOUNTER FOR PRE-EMPLOYMENT HEALTH SCREENING EXAMINATION: Primary | ICD-10-CM

## 2024-08-20 DIAGNOSIS — Z02.1 PHYSICAL EXAM, PRE-EMPLOYMENT: Primary | ICD-10-CM

## 2024-08-20 DIAGNOSIS — G47.33 OSA (OBSTRUCTIVE SLEEP APNEA): ICD-10-CM

## 2024-08-20 LAB — RUBV IGG SERPL IA-ACNC: 127 IU/ML

## 2024-08-20 PROCEDURE — 86706 HEP B SURFACE ANTIBODY: CPT | Performed by: PHYSICIAN ASSISTANT

## 2024-08-20 PROCEDURE — 86735 MUMPS ANTIBODY: CPT | Performed by: PHYSICIAN ASSISTANT

## 2024-08-20 PROCEDURE — 86765 RUBEOLA ANTIBODY: CPT | Performed by: PHYSICIAN ASSISTANT

## 2024-08-20 PROCEDURE — 86480 TB TEST CELL IMMUN MEASURE: CPT | Performed by: PHYSICIAN ASSISTANT

## 2024-08-20 PROCEDURE — 86787 VARICELLA-ZOSTER ANTIBODY: CPT | Performed by: PHYSICIAN ASSISTANT

## 2024-08-20 PROCEDURE — 86762 RUBELLA ANTIBODY: CPT | Performed by: PHYSICIAN ASSISTANT

## 2024-08-20 RX ORDER — OMEPRAZOLE 40 MG/1
40 CAPSULE, DELAYED RELEASE ORAL DAILY
Qty: 90 CAPSULE | Refills: 0 | Status: SHIPPED | OUTPATIENT
Start: 2024-08-20

## 2024-08-20 RX ORDER — TIRZEPATIDE 10 MG/.5ML
10 INJECTION, SOLUTION SUBCUTANEOUS WEEKLY
Qty: 2 ML | Refills: 0 | Status: SHIPPED | OUTPATIENT
Start: 2024-08-20 | End: 2024-08-20 | Stop reason: SDUPTHER

## 2024-08-20 RX ORDER — TIRZEPATIDE 10 MG/.5ML
10 INJECTION, SOLUTION SUBCUTANEOUS WEEKLY
Qty: 6 ML | Refills: 1 | Status: SHIPPED | OUTPATIENT
Start: 2024-08-20

## 2024-08-21 LAB
GAMMA INTERFERON BACKGROUND BLD IA-ACNC: 0.02 IU/ML
HBV SURFACE AB SER-ACNC: 33.9 MIU/ML
M TB IFN-G BLD-IMP: NEGATIVE
M TB IFN-G CD4+ BCKGRND COR BLD-ACNC: 0.03 IU/ML
M TB IFN-G CD4+ BCKGRND COR BLD-ACNC: 0.06 IU/ML
MEV IGG SER QL IA: NORMAL
MITOGEN IGNF BCKGRD COR BLD-ACNC: 9.98 IU/ML
MUV IGG SER QL IA: ABNORMAL
VZV IGG SER QL IA: NORMAL

## 2024-08-23 ENCOUNTER — PATIENT MESSAGE (OUTPATIENT)
Dept: FAMILY MEDICINE CLINIC | Facility: CLINIC | Age: 49
End: 2024-08-23

## 2024-08-23 DIAGNOSIS — L03.019 PARONYCHIA OF FINGER, UNSPECIFIED LATERALITY: Primary | ICD-10-CM

## 2024-08-26 ENCOUNTER — PATIENT MESSAGE (OUTPATIENT)
Dept: FAMILY MEDICINE CLINIC | Facility: CLINIC | Age: 49
End: 2024-08-26

## 2024-08-26 DIAGNOSIS — L03.119 CELLULITIS OF UPPER EXTREMITY, UNSPECIFIED LATERALITY: Primary | ICD-10-CM

## 2024-08-26 RX ORDER — MUPIROCIN 20 MG/G
OINTMENT TOPICAL 3 TIMES DAILY
Qty: 22 G | Refills: 1 | Status: SHIPPED | OUTPATIENT
Start: 2024-08-26

## 2024-08-27 RX ORDER — SULFAMETHOXAZOLE/TRIMETHOPRIM 800-160 MG
1 TABLET ORAL 2 TIMES DAILY
Qty: 14 TABLET | Refills: 0 | Status: SHIPPED | OUTPATIENT
Start: 2024-08-27 | End: 2024-09-03

## 2024-08-30 ENCOUNTER — PATIENT MESSAGE (OUTPATIENT)
Dept: FAMILY MEDICINE CLINIC | Facility: CLINIC | Age: 49
End: 2024-08-30

## 2024-08-30 DIAGNOSIS — L30.4 INTERTRIGO: Primary | ICD-10-CM

## 2024-09-03 DIAGNOSIS — Z01.84 IMMUNITY STATUS TESTING: Primary | ICD-10-CM

## 2024-09-03 RX ORDER — CLOTRIMAZOLE AND BETAMETHASONE DIPROPIONATE 10; .64 MG/G; MG/G
CREAM TOPICAL 2 TIMES DAILY
Qty: 15 G | Refills: 1 | Status: SHIPPED | OUTPATIENT
Start: 2024-09-03 | End: 2024-09-10

## 2024-09-11 ENCOUNTER — PATIENT MESSAGE (OUTPATIENT)
Dept: FAMILY MEDICINE CLINIC | Facility: CLINIC | Age: 49
End: 2024-09-11

## 2024-09-11 DIAGNOSIS — I10 ESSENTIAL HYPERTENSION: Primary | ICD-10-CM

## 2024-09-18 RX ORDER — HYDROCHLOROTHIAZIDE 12.5 MG/1
12.5 TABLET ORAL DAILY
Qty: 30 TABLET | Refills: 5 | Status: SHIPPED | OUTPATIENT
Start: 2024-09-18 | End: 2025-03-17

## 2024-09-24 ENCOUNTER — PATIENT MESSAGE (OUTPATIENT)
Dept: FAMILY MEDICINE CLINIC | Facility: CLINIC | Age: 49
End: 2024-09-24

## 2024-09-24 DIAGNOSIS — M48.061 SPINAL STENOSIS OF LUMBAR REGION WITHOUT NEUROGENIC CLAUDICATION: ICD-10-CM

## 2024-09-25 RX ORDER — DULOXETIN HYDROCHLORIDE 60 MG/1
60 CAPSULE, DELAYED RELEASE ORAL 2 TIMES DAILY
Qty: 180 CAPSULE | Refills: 1 | Status: SHIPPED | OUTPATIENT
Start: 2024-09-25

## 2024-10-08 ENCOUNTER — APPOINTMENT (OUTPATIENT)
Dept: LAB | Facility: HOSPITAL | Age: 49
End: 2024-10-08
Payer: COMMERCIAL

## 2024-10-08 ENCOUNTER — OFFICE VISIT (OUTPATIENT)
Dept: FAMILY MEDICINE CLINIC | Facility: CLINIC | Age: 49
End: 2024-10-08
Payer: COMMERCIAL

## 2024-10-08 VITALS
HEIGHT: 67 IN | OXYGEN SATURATION: 98 % | TEMPERATURE: 98.2 F | RESPIRATION RATE: 18 BRPM | BODY MASS INDEX: 29.99 KG/M2 | SYSTOLIC BLOOD PRESSURE: 118 MMHG | DIASTOLIC BLOOD PRESSURE: 78 MMHG | WEIGHT: 191.1 LBS | HEART RATE: 83 BPM

## 2024-10-08 DIAGNOSIS — E04.1 THYROID NODULE: ICD-10-CM

## 2024-10-08 DIAGNOSIS — D69.2 PURPURA (HCC): ICD-10-CM

## 2024-10-08 DIAGNOSIS — R23.3 PETECHIAL RASH: Primary | ICD-10-CM

## 2024-10-08 DIAGNOSIS — E53.8 B12 DEFICIENCY: ICD-10-CM

## 2024-10-08 DIAGNOSIS — I10 ESSENTIAL HYPERTENSION: ICD-10-CM

## 2024-10-08 DIAGNOSIS — R23.3 PETECHIAL RASH: ICD-10-CM

## 2024-10-08 DIAGNOSIS — C54.1 ENDOMETRIAL CANCER (HCC): ICD-10-CM

## 2024-10-08 LAB
C3 SERPL-MCNC: 131 MG/DL (ref 87–200)
C4 SERPL-MCNC: 42 MG/DL (ref 19–52)
TSH SERPL DL<=0.05 MIU/L-ACNC: 0.89 UIU/ML (ref 0.45–4.5)

## 2024-10-08 PROCEDURE — 99214 OFFICE O/P EST MOD 30 MIN: CPT | Performed by: INTERNAL MEDICINE

## 2024-10-08 PROCEDURE — 82595 ASSAY OF CRYOGLOBULIN: CPT

## 2024-10-08 PROCEDURE — 84443 ASSAY THYROID STIM HORMONE: CPT

## 2024-10-08 PROCEDURE — 86160 COMPLEMENT ANTIGEN: CPT

## 2024-10-08 RX ORDER — METOPROLOL SUCCINATE 50 MG/1
25 TABLET, EXTENDED RELEASE ORAL
Start: 2024-10-08

## 2024-10-08 NOTE — ASSESSMENT & PLAN NOTE
Orders:    Comprehensive metabolic panel; Future    metoprolol succinate (TOPROL-XL) 50 mg 24 hr tablet; Take 0.5 tablets (25 mg total) by mouth daily at bedtime

## 2024-10-08 NOTE — PROGRESS NOTES
Ambulatory Visit  Name: Ting Vasques      : 1975      MRN: [id number]  Encounter Provider: Rosa Blankenship MD  Encounter Date: 10/8/2024   Encounter department: [facility] PRIMARY CARE    Assessment & Plan  Petechial rash    Orders:    CBC and differential; Future    Comprehensive metabolic panel; Future    Sedimentation rate, automated; Future    C-reactive protein; Future    LAUREN Screen w/ Reflex to Titer/Pattern; Future    Vitamin B12; Future    Cryoglobulin; Future    C3 complement; Future    C4 complement; Future    Purpura (HCC)    Orders:    CBC and differential; Future    Comprehensive metabolic panel; Future    Sedimentation rate, automated; Future    C-reactive protein; Future    LAUREN Screen w/ Reflex to Titer/Pattern; Future    Vitamin B12; Future    Cryoglobulin; Future    C3 complement; Future    C4 complement; Future    Essential hypertension    Orders:    Comprehensive metabolic panel; Future    metoprolol succinate (TOPROL-XL) 50 mg 24 hr tablet; Take 0.5 tablets (25 mg total) by mouth daily at bedtime    Endometrial cancer (HCC)    Orders:    CBC and differential; Future    Comprehensive metabolic panel; Future    B12 deficiency    Orders:    Vitamin B12; Future       History of Present Illness     47yo female with HTN, pre-diabetes here for acute visit due to rash.     Reports petechial rash in lower legs since . Had ankle brace on left ankle that left purpuric rash. Occurred after wearing compression socks. Otherwise feeling well, no bleeding gums, epistaxis, fever/chills, sore throat or cough.     Bp improved with HCTZ 25mg. Still taking metoprolol 50mg at bedtime.     Weight is down 60 lbs with Zepbound. Denies nausea/vomiting but does have constipation, which she manages with stool softeners.           Review of Systems   Constitutional:  Positive for fatigue. Negative for appetite change, chills and fever.   Respiratory:  Positive for shortness of breath. Negative  "for cough.    Cardiovascular:  Negative for chest pain and leg swelling.   Gastrointestinal:  Positive for constipation. Negative for nausea and vomiting.   Skin:  Positive for color change and rash.   Neurological:  Positive for light-headedness.           Objective     /78   Pulse 83   Temp 98.2 °F (36.8 °C) (Temporal)   Resp 18   Ht 5' 7\" (1.702 m)   Wt 86.7 kg (191 lb 1.6 oz)   LMP 03/14/2024 Comment: per pt has MIRENA in place  SpO2 98%   BMI 29.93 kg/m²     Physical Exam  Vitals reviewed.   Constitutional:       General: She is not in acute distress.     Appearance: She is normal weight.   Cardiovascular:      Rate and Rhythm: Normal rate and regular rhythm.   Pulmonary:      Effort: Pulmonary effort is normal. No respiratory distress.   Musculoskeletal:      Right lower leg: No edema.      Left lower leg: No edema.   Skin:     Findings: Petechiae and rash present. Rash is purpuric.      Comments: Non-blanching petechia on bilateral lower legs, purpuric rash on dorsum of right foot   Neurological:      General: No focal deficit present.      Mental Status: She is alert.   Psychiatric:         Mood and Affect: Mood and affect normal.         Behavior: Behavior normal. Behavior is cooperative.         "

## 2024-10-09 ENCOUNTER — PATIENT MESSAGE (OUTPATIENT)
Dept: FAMILY MEDICINE CLINIC | Facility: CLINIC | Age: 49
End: 2024-10-09

## 2024-10-09 DIAGNOSIS — E87.6 HYPOKALEMIA: Primary | ICD-10-CM

## 2024-10-09 DIAGNOSIS — E53.8 B12 DEFICIENCY: ICD-10-CM

## 2024-10-09 DIAGNOSIS — I10 ESSENTIAL HYPERTENSION: ICD-10-CM

## 2024-10-09 RX ORDER — POTASSIUM CHLORIDE 1500 MG/1
40 TABLET, EXTENDED RELEASE ORAL 2 TIMES DAILY
Qty: 28 TABLET | Refills: 0 | Status: SHIPPED | OUTPATIENT
Start: 2024-10-09 | End: 2024-10-16

## 2024-10-09 RX ORDER — CYANOCOBALAMIN 1000 UG/ML
1000 INJECTION, SOLUTION INTRAMUSCULAR; SUBCUTANEOUS WEEKLY
Qty: 10 ML | Refills: 1 | Status: SHIPPED | OUTPATIENT
Start: 2024-10-09 | End: 2024-10-31

## 2024-10-14 ENCOUNTER — TELEPHONE (OUTPATIENT)
Age: 49
End: 2024-10-14

## 2024-10-14 RX ORDER — HYDROCHLOROTHIAZIDE 25 MG/1
25 TABLET ORAL DAILY
Qty: 90 TABLET | Refills: 1 | Status: SHIPPED | OUTPATIENT
Start: 2024-10-14

## 2024-10-15 LAB — CRYOGLOB SER QL 1D COLD INC: NORMAL

## 2024-10-17 DIAGNOSIS — B37.31 VAGINA, CANDIDIASIS: Primary | ICD-10-CM

## 2024-10-17 RX ORDER — FLUCONAZOLE 150 MG/1
TABLET ORAL
Qty: 2 TABLET | Refills: 0 | Status: SHIPPED | OUTPATIENT
Start: 2024-10-17 | End: 2024-10-19

## 2024-10-25 ENCOUNTER — PATIENT MESSAGE (OUTPATIENT)
Dept: FAMILY MEDICINE CLINIC | Facility: CLINIC | Age: 49
End: 2024-10-25

## 2024-10-25 ENCOUNTER — IMMUNIZATIONS (OUTPATIENT)
Dept: FAMILY MEDICINE CLINIC | Facility: CLINIC | Age: 49
End: 2024-10-25
Payer: COMMERCIAL

## 2024-10-25 ENCOUNTER — APPOINTMENT (OUTPATIENT)
Dept: ULTRASOUND IMAGING | Facility: HOSPITAL | Age: 49
End: 2024-10-25
Payer: COMMERCIAL

## 2024-10-25 ENCOUNTER — APPOINTMENT (OUTPATIENT)
Dept: LAB | Facility: CLINIC | Age: 49
End: 2024-10-25
Payer: COMMERCIAL

## 2024-10-25 DIAGNOSIS — E04.1 THYROID NODULE: ICD-10-CM

## 2024-10-25 DIAGNOSIS — Z23 ENCOUNTER FOR IMMUNIZATION: Primary | ICD-10-CM

## 2024-10-25 DIAGNOSIS — R09.A2 GLOBUS SENSATION: ICD-10-CM

## 2024-10-25 DIAGNOSIS — E87.6 HYPOKALEMIA: ICD-10-CM

## 2024-10-25 DIAGNOSIS — J02.9 SORE THROAT: Primary | ICD-10-CM

## 2024-10-25 PROCEDURE — 90656 IIV3 VACC NO PRSV 0.5 ML IM: CPT

## 2024-10-25 PROCEDURE — 80048 BASIC METABOLIC PNL TOTAL CA: CPT

## 2024-10-25 PROCEDURE — 36415 COLL VENOUS BLD VENIPUNCTURE: CPT

## 2024-10-25 PROCEDURE — G0008 ADMIN INFLUENZA VIRUS VAC: HCPCS

## 2024-10-26 LAB
ANION GAP SERPL CALCULATED.3IONS-SCNC: 9 MMOL/L (ref 4–13)
BUN SERPL-MCNC: 10 MG/DL (ref 5–25)
CALCIUM SERPL-MCNC: 9.3 MG/DL (ref 8.4–10.2)
CHLORIDE SERPL-SCNC: 99 MMOL/L (ref 96–108)
CO2 SERPL-SCNC: 32 MMOL/L (ref 21–32)
CREAT SERPL-MCNC: 0.66 MG/DL (ref 0.6–1.3)
GFR SERPL CREATININE-BSD FRML MDRD: 104 ML/MIN/1.73SQ M
GLUCOSE SERPL-MCNC: 94 MG/DL (ref 65–140)
POTASSIUM SERPL-SCNC: 3.5 MMOL/L (ref 3.5–5.3)
SODIUM SERPL-SCNC: 140 MMOL/L (ref 135–147)

## 2024-10-27 ENCOUNTER — PATIENT MESSAGE (OUTPATIENT)
Dept: FAMILY MEDICINE CLINIC | Facility: CLINIC | Age: 49
End: 2024-10-27

## 2024-10-27 DIAGNOSIS — J40 BRONCHITIS: Primary | ICD-10-CM

## 2024-10-31 RX ORDER — AZITHROMYCIN 250 MG/1
TABLET, FILM COATED ORAL
Qty: 6 TABLET | Refills: 0 | Status: SHIPPED | OUTPATIENT
Start: 2024-10-31 | End: 2024-11-04

## 2024-11-06 ENCOUNTER — HOSPITAL ENCOUNTER (OUTPATIENT)
Dept: RADIOLOGY | Age: 49
Discharge: HOME | End: 2024-11-06
Payer: COMMERCIAL

## 2024-11-06 DIAGNOSIS — E04.1 THYROID NODULE: ICD-10-CM

## 2024-11-06 PROCEDURE — 76536 US EXAM OF HEAD AND NECK: CPT

## 2024-11-16 ENCOUNTER — PATIENT MESSAGE (OUTPATIENT)
Dept: FAMILY MEDICINE CLINIC | Facility: CLINIC | Age: 49
End: 2024-11-16

## 2024-11-16 DIAGNOSIS — L03.019 CELLULITIS OF FINGER, UNSPECIFIED LATERALITY: Primary | ICD-10-CM

## 2024-11-18 RX ORDER — SULFAMETHOXAZOLE AND TRIMETHOPRIM 800; 160 MG/1; MG/1
1 TABLET ORAL 2 TIMES DAILY
Qty: 14 TABLET | Refills: 0 | Status: SHIPPED | OUTPATIENT
Start: 2024-11-18 | End: 2024-11-25

## 2024-11-18 NOTE — PATIENT COMMUNICATION
Pt called to schedule appointment. She works in [place] till 7pm today- Wednesday. I advised patient that she should be seen for this finger because its red, swollen and warm to touch. Advised patient since she cannot make appointment to please  be seen at a [facility] location to be evaluated. Provided patient with address to a [facility] close to her.

## 2024-11-19 DIAGNOSIS — K21.9 GASTROESOPHAGEAL REFLUX DISEASE, UNSPECIFIED WHETHER ESOPHAGITIS PRESENT: ICD-10-CM

## 2024-11-19 DIAGNOSIS — R09.A2 GLOBUS SENSATION: ICD-10-CM

## 2024-11-19 RX ORDER — OMEPRAZOLE 40 MG/1
40 CAPSULE, DELAYED RELEASE ORAL DAILY
Qty: 90 CAPSULE | Refills: 2 | Status: SHIPPED | OUTPATIENT
Start: 2024-11-19

## 2024-11-19 NOTE — TELEPHONE ENCOUNTER
Patient requesting refill(s) of: omeprazole 40 mg     Last filled: 8/20/24  Last appt: 10/8/24  Next appt:12/10/24  Pharmacy: [facility] [place]

## 2024-11-25 ENCOUNTER — PATIENT MESSAGE (OUTPATIENT)
Dept: FAMILY MEDICINE CLINIC | Facility: CLINIC | Age: 49
End: 2024-11-25

## 2024-11-26 ENCOUNTER — PATIENT MESSAGE (OUTPATIENT)
Dept: FAMILY MEDICINE CLINIC | Facility: CLINIC | Age: 49
End: 2024-11-26

## 2024-11-26 DIAGNOSIS — I10 ESSENTIAL HYPERTENSION: ICD-10-CM

## 2024-11-26 DIAGNOSIS — E66.813 CLASS 3 SEVERE OBESITY DUE TO EXCESS CALORIES WITH SERIOUS COMORBIDITY AND BODY MASS INDEX (BMI) OF 40.0 TO 44.9 IN ADULT (HCC): Primary | ICD-10-CM

## 2024-11-26 DIAGNOSIS — G47.33 OSA (OBSTRUCTIVE SLEEP APNEA): ICD-10-CM

## 2024-11-26 DIAGNOSIS — R73.03 PREDIABETES: ICD-10-CM

## 2024-11-26 DIAGNOSIS — E66.01 CLASS 3 SEVERE OBESITY DUE TO EXCESS CALORIES WITH SERIOUS COMORBIDITY AND BODY MASS INDEX (BMI) OF 40.0 TO 44.9 IN ADULT (HCC): Primary | ICD-10-CM

## 2024-11-26 DIAGNOSIS — Z98.84 HISTORY OF ROUX-EN-Y GASTRIC BYPASS: ICD-10-CM

## 2024-11-27 RX ORDER — TIRZEPATIDE 10 MG/.5ML
10 INJECTION, SOLUTION SUBCUTANEOUS WEEKLY
Qty: 6 ML | Refills: 0 | Status: SHIPPED | OUTPATIENT
Start: 2024-11-27

## 2024-11-29 ENCOUNTER — PATIENT MESSAGE (OUTPATIENT)
Dept: FAMILY MEDICINE CLINIC | Facility: CLINIC | Age: 49
End: 2024-11-29

## 2024-11-29 ENCOUNTER — RA CDI HCC (OUTPATIENT)
Dept: OTHER | Facility: HOSPITAL | Age: 49
End: 2024-11-29

## 2024-12-02 ENCOUNTER — TELEPHONE (OUTPATIENT)
Age: 49
End: 2024-12-02

## 2024-12-02 NOTE — TELEPHONE ENCOUNTER
Mounjaro will not be covered with or without a prior authorization, Mounjaro  is not FDA approved for obesity, prediabetes, etc. Mounjaro  is only FDA Approved for Type 2 Diabetes and will only be Approved via a Prior Authorization if patient has a diagnosis of Type 2 Diabetes.

## 2024-12-04 NOTE — TELEPHONE ENCOUNTER
I informed patient and she was frustrated, but will call her insurance company to see what is covered or how this med can be covered.

## 2024-12-04 NOTE — TELEPHONE ENCOUNTER
I relayed the message from 12/2 to patient.  She did voice understanding but would like a PA be started for the Mounjaro regardless.  She stated her sister is on it, is prediabetic/not type II diabetic and was able to get this approved after doing an appeal.  She was adamant that a PA be started knowing that it will be denied so an appeal process can be started.    Patient states she has lost 75 pounds so far and only has one more dose of the zepbound left.  She is no longer able to get the zepbound due to a change in job and insurance.    Please advise.

## 2024-12-05 ENCOUNTER — APPOINTMENT (OUTPATIENT)
Dept: LAB | Facility: CLINIC | Age: 49
End: 2024-12-05
Payer: COMMERCIAL

## 2024-12-05 ENCOUNTER — TELEPHONE (OUTPATIENT)
Age: 49
End: 2024-12-05

## 2024-12-05 DIAGNOSIS — Z98.84 HISTORY OF ROUX-EN-Y GASTRIC BYPASS: ICD-10-CM

## 2024-12-05 DIAGNOSIS — E66.01 CLASS 3 SEVERE OBESITY DUE TO EXCESS CALORIES WITH SERIOUS COMORBIDITY AND BODY MASS INDEX (BMI) OF 40.0 TO 44.9 IN ADULT (HCC): ICD-10-CM

## 2024-12-05 DIAGNOSIS — R73.03 PREDIABETES: ICD-10-CM

## 2024-12-05 DIAGNOSIS — E55.9 VITAMIN D DEFICIENCY: ICD-10-CM

## 2024-12-05 DIAGNOSIS — E66.813 CLASS 3 SEVERE OBESITY DUE TO EXCESS CALORIES WITH SERIOUS COMORBIDITY AND BODY MASS INDEX (BMI) OF 40.0 TO 44.9 IN ADULT (HCC): ICD-10-CM

## 2024-12-05 PROCEDURE — 80061 LIPID PANEL: CPT

## 2024-12-05 PROCEDURE — 82728 ASSAY OF FERRITIN: CPT

## 2024-12-05 PROCEDURE — 83036 HEMOGLOBIN GLYCOSYLATED A1C: CPT

## 2024-12-05 PROCEDURE — 83540 ASSAY OF IRON: CPT

## 2024-12-05 PROCEDURE — 36415 COLL VENOUS BLD VENIPUNCTURE: CPT

## 2024-12-05 PROCEDURE — 82306 VITAMIN D 25 HYDROXY: CPT

## 2024-12-05 PROCEDURE — 83550 IRON BINDING TEST: CPT

## 2024-12-05 NOTE — TELEPHONE ENCOUNTER
PA for Mounjaro 10 MG/0.5ML SOAJ SUBMITTED to     via    [x]CMM-KEY: [id number]  []Surescripts-Case ID #   []Availity-Auth ID # NDC #   []Faxed to plan   []Other website   []Phone call Case ID #     [x]PA sent as URGENT    All office notes, labs and other pertaining documents and studies sent. Clinical questions answered. Awaiting determination from insurance company.     Turnaround time for your insurance to make a decision on your Prior Authorization can take 7-21 business days.

## 2024-12-05 NOTE — TELEPHONE ENCOUNTER
PA for Mounjaro 10 mg  DENIED    Reason:(Screenshot if applicable)        Message sent to office clinical pool Yes    Denial letter scanned into Media Yes    Appeal started No (Provider will need to decide if appeal is warranted and send clinical documentation to Prior Authorization Team for initiation.)    **Please follow up with your patient regarding denial and next steps**

## 2024-12-06 LAB
25(OH)D3 SERPL-MCNC: 30.9 NG/ML (ref 30–100)
CHOLEST SERPL-MCNC: 161 MG/DL (ref ?–200)
EST. AVERAGE GLUCOSE BLD GHB EST-MCNC: 108 MG/DL
FERRITIN SERPL-MCNC: 52 NG/ML (ref 11–307)
HBA1C MFR BLD: 5.4 %
HDLC SERPL-MCNC: 64 MG/DL
IRON SATN MFR SERPL: 23 % (ref 15–50)
IRON SERPL-MCNC: 78 UG/DL (ref 50–212)
LDLC SERPL CALC-MCNC: 84 MG/DL (ref 0–100)
TIBC SERPL-MCNC: 333 UG/DL (ref 250–450)
TRIGL SERPL-MCNC: 66 MG/DL (ref ?–150)
UIBC SERPL-MCNC: 255 UG/DL (ref 155–355)

## 2024-12-10 ENCOUNTER — ANESTHESIA EVENT (OUTPATIENT)
Dept: GASTROENTEROLOGY | Facility: HOSPITAL | Age: 49
End: 2024-12-10
Payer: COMMERCIAL

## 2024-12-10 ENCOUNTER — ANESTHESIA (OUTPATIENT)
Dept: GASTROENTEROLOGY | Facility: HOSPITAL | Age: 49
End: 2024-12-10
Payer: COMMERCIAL

## 2024-12-10 ENCOUNTER — OFFICE VISIT (OUTPATIENT)
Dept: FAMILY MEDICINE CLINIC | Facility: CLINIC | Age: 49
End: 2024-12-10
Payer: COMMERCIAL

## 2024-12-10 ENCOUNTER — HOSPITAL ENCOUNTER (OUTPATIENT)
Dept: GASTROENTEROLOGY | Facility: HOSPITAL | Age: 49
Setting detail: OUTPATIENT SURGERY
Discharge: HOME | End: 2024-12-10
Attending: STUDENT IN AN ORGANIZED HEALTH CARE EDUCATION/TRAINING PROGRAM
Payer: COMMERCIAL

## 2024-12-10 VITALS
RESPIRATION RATE: 18 BRPM | BODY MASS INDEX: 28.33 KG/M2 | TEMPERATURE: 98.1 F | HEIGHT: 67 IN | WEIGHT: 180.5 LBS | SYSTOLIC BLOOD PRESSURE: 118 MMHG | HEART RATE: 92 BPM | OXYGEN SATURATION: 99 % | DIASTOLIC BLOOD PRESSURE: 64 MMHG

## 2024-12-10 VITALS
WEIGHT: 175 LBS | HEIGHT: 67 IN | DIASTOLIC BLOOD PRESSURE: 59 MMHG | OXYGEN SATURATION: 100 % | BODY MASS INDEX: 27.47 KG/M2 | SYSTOLIC BLOOD PRESSURE: 106 MMHG | TEMPERATURE: 97.3 F | RESPIRATION RATE: 13 BRPM | HEART RATE: 66 BPM

## 2024-12-10 DIAGNOSIS — Z00.00 ANNUAL PHYSICAL EXAM: Primary | ICD-10-CM

## 2024-12-10 DIAGNOSIS — E66.813 CLASS 3 SEVERE OBESITY DUE TO EXCESS CALORIES WITH SERIOUS COMORBIDITY AND BODY MASS INDEX (BMI) OF 40.0 TO 44.9 IN ADULT (HCC): ICD-10-CM

## 2024-12-10 DIAGNOSIS — R73.03 PREDIABETES: ICD-10-CM

## 2024-12-10 DIAGNOSIS — E55.9 VITAMIN D DEFICIENCY: ICD-10-CM

## 2024-12-10 DIAGNOSIS — I10 ESSENTIAL HYPERTENSION: ICD-10-CM

## 2024-12-10 DIAGNOSIS — R59.0 POSTERIOR CERVICAL ADENOPATHY: ICD-10-CM

## 2024-12-10 DIAGNOSIS — E87.6 HYPOKALEMIA: ICD-10-CM

## 2024-12-10 DIAGNOSIS — Z86.0100 HISTORY OF COLON POLYPS: ICD-10-CM

## 2024-12-10 DIAGNOSIS — E66.01 CLASS 3 SEVERE OBESITY DUE TO EXCESS CALORIES WITH SERIOUS COMORBIDITY AND BODY MASS INDEX (BMI) OF 40.0 TO 44.9 IN ADULT (HCC): ICD-10-CM

## 2024-12-10 DIAGNOSIS — Z98.84 HISTORY OF ROUX-EN-Y GASTRIC BYPASS: ICD-10-CM

## 2024-12-10 DIAGNOSIS — E53.8 B12 DEFICIENCY: ICD-10-CM

## 2024-12-10 DIAGNOSIS — G47.33 OSA (OBSTRUCTIVE SLEEP APNEA): ICD-10-CM

## 2024-12-10 PROCEDURE — 88305 TISSUE EXAM BY PATHOLOGIST: CPT | Performed by: PATHOLOGY

## 2024-12-10 PROCEDURE — 45385 COLONOSCOPY W/LESION REMOVAL: CPT | Performed by: STUDENT IN AN ORGANIZED HEALTH CARE EDUCATION/TRAINING PROGRAM

## 2024-12-10 PROCEDURE — 99396 PREV VISIT EST AGE 40-64: CPT | Performed by: INTERNAL MEDICINE

## 2024-12-10 RX ORDER — SODIUM CHLORIDE, SODIUM LACTATE, POTASSIUM CHLORIDE, CALCIUM CHLORIDE 600; 310; 30; 20 MG/100ML; MG/100ML; MG/100ML; MG/100ML
125 INJECTION, SOLUTION INTRAVENOUS CONTINUOUS
Status: DISCONTINUED | OUTPATIENT
Start: 2024-12-10 | End: 2024-12-14 | Stop reason: HOSPADM

## 2024-12-10 RX ORDER — PROPOFOL 10 MG/ML
INJECTION, EMULSION INTRAVENOUS CONTINUOUS PRN
Status: DISCONTINUED | OUTPATIENT
Start: 2024-12-10 | End: 2024-12-10

## 2024-12-10 RX ORDER — PROPOFOL 10 MG/ML
INJECTION, EMULSION INTRAVENOUS AS NEEDED
Status: DISCONTINUED | OUTPATIENT
Start: 2024-12-10 | End: 2024-12-10

## 2024-12-10 RX ADMIN — PROPOFOL 100 MG: 10 INJECTION, EMULSION INTRAVENOUS at 09:05

## 2024-12-10 RX ADMIN — SODIUM CHLORIDE, SODIUM LACTATE, POTASSIUM CHLORIDE, AND CALCIUM CHLORIDE: .6; .31; .03; .02 INJECTION, SOLUTION INTRAVENOUS at 09:01

## 2024-12-10 RX ADMIN — PROPOFOL 120 MCG/KG/MIN: 10 INJECTION, EMULSION INTRAVENOUS at 09:05

## 2024-12-10 NOTE — PATIENT INSTRUCTIONS
"Patient Education     Routine physical for adults   The Basics   Written by the doctors and editors at [facility]   What is a physical? -- A physical is a routine visit, or \"check-up,\" with your doctor. You might also hear it called a \"wellness visit\" or \"preventive visit.\"  During each visit, the doctor will:   Ask about your physical and mental health   Ask about your habits, behaviors, and lifestyle   Do an exam   Give you vaccines if needed   Talk to you about any medicines you take   Give advice about your health   Answer your questions  Getting regular check-ups is an important part of taking care of your health. It can help your doctor find and treat any problems you have. But it's also important for preventing health problems.  A routine physical is different from a \"sick visit.\" A sick visit is when you see a doctor because of a health concern or problem. Since physicals are scheduled ahead of time, you can think about what you want to ask the doctor.  How often should I get a physical? -- It depends on your age and health. In general, for people age 21 years and older:   If you are younger than 50 years, you might be able to get a physical every 3 years.   If you are 50 years or older, your doctor might recommend a physical every year.  If you have an ongoing health condition, like diabetes or high blood pressure, your doctor will probably want to see you more often.  What happens during a physical? -- In general, each visit will include:   Physical exam - The doctor or nurse will check your height, weight, heart rate, and blood pressure. They will also look at your eyes and ears. They will ask about how you are feeling and whether you have any symptoms that bother you.   Medicines - It's a good idea to bring a list of all the medicines you take to each doctor visit. Your doctor will talk to you about your medicines and answer any questions. Tell them if you are having any side effects that bother you. You " "should also tell them if you are having trouble paying for any of your medicines.   Habits and behaviors - This includes:   Your diet   Your exercise habits   Whether you smoke, drink alcohol, or use drugs   Whether you are sexually active   Whether you feel safe at home  Your doctor will talk to you about things you can do to improve your health and lower your risk of health problems. They will also offer help and support. For example, if you want to quit smoking, they can give you advice and might prescribe medicines. If you want to improve your diet or get more physical activity, they can help you with this, too.   Lab tests, if needed - The tests you get will depend on your age and situation. For example, your doctor might want to check your:   Cholesterol   Blood sugar   Iron level   Vaccines - The recommended vaccines will depend on your age, health, and what vaccines you already had. Vaccines are very important because they can prevent certain serious or deadly infections.   Discussion of screening - \"Screening\" means checking for diseases or other health problems before they cause symptoms. Your doctor can recommend screening based on your age, risk, and preferences. This might include tests to check for:   Cancer, such as breast, prostate, cervical, ovarian, colorectal, prostate, lung, or skin cancer   Sexually transmitted infections, such as chlamydia and gonorrhea   Mental health conditions like depression and anxiety  Your doctor will talk to you about the different types of screening tests. They can help you decide which screenings to have. They can also explain what the results might mean.   Answering questions - The physical is a good time to ask the doctor or nurse questions about your health. If needed, they can refer you to other doctors or specialists, too.  Adults older than 65 years often need other care, too. As you get older, your doctor will talk to you about:   How to prevent falling at " home   Hearing or vision tests   Memory testing   How to take your medicines safely   Making sure that you have the help and support you need at home  All topics are updated as new evidence becomes available and our peer review process is complete.  This topic retrieved from [employer] on: May 02, 2024.  Topic 489898 Version 1.0  Release: 32.4.3 - C32.122  © 2024 UpToDate, Inc. and/or its affiliates. All rights reserved.  Consumer Information Use and Disclaimer   Disclaimer: This generalized information is a limited summary of diagnosis, treatment, and/or medication information. It is not meant to be comprehensive and should be used as a tool to help the user understand and/or assess potential diagnostic and treatment options. It does NOT include all information about conditions, treatments, medications, side effects, or risks that may apply to a specific patient. It is not intended to be medical advice or a substitute for the medical advice, diagnosis, or treatment of a health care provider based on the health care provider's examination and assessment of a patient's specific and unique circumstances. Patients must speak with a health care provider for complete information about their health, medical questions, and treatment options, including any risks or benefits regarding use of medications. This information does not endorse any treatments or medications as safe, effective, or approved for treating a specific patient. UpToDate, Inc. and its affiliates disclaim any warranty or liability relating to this information or the use thereof.The use of this information is governed by the Terms of Use, available at https://www.woltersPerfect Memoryuwer.com/en/know/clinical-effectiveness-terms. 2024© UpToDate, Inc. and its affiliates and/or licensors. All rights reserved.  Copyright   © 2024 UpToDate, Inc. and/or its affiliates. All rights reserved.

## 2024-12-10 NOTE — TELEPHONE ENCOUNTER
Patient is again requesting that we appeal the PA decision for Mounjaro. She would like to make sure that it is included in the appeal that her A1C was initially in the pre-diabetic range before starting the zepbound and now it is in a more controlled range (6.3% pre medication ->  5.4% most recent A1C) and she believes it will increase again when she is not on the medication.

## 2024-12-10 NOTE — ANESTHESIA PREPROCEDURE EVALUATION
Procedure:  COLONOSCOPY      Relevant Problems   CARDIO   (+) Essential hypertension      GI/HEPATIC   (+) Gastroesophageal reflux disease      GYN   (+) Endometrial cancer (HCC)      NEURO/PSYCH   (+) Anxiety        Physical Exam    Airway    Mallampati score: II  TM Distance: >3 FB  Neck ROM: full     Dental        Cardiovascular  Cardiovascular exam normal    Pulmonary  Pulmonary exam normal     Other Findings        Anesthesia Plan  ASA Score- 3     Anesthesia Type- IV sedation with anesthesia with ASA Monitors.         Additional Monitors:     Airway Plan:            Plan Factors-Exercise tolerance (METS): >4 METS.    Chart reviewed.   Existing labs reviewed. Patient summary reviewed.    Patient is not a current smoker.              Induction- intravenous.    Postoperative Plan-         Informed Consent- Anesthetic plan and risks discussed with patient.  I personally reviewed this patient with the CRNA. Discussed and agreed on the Anesthesia Plan with the CRNA..

## 2024-12-10 NOTE — PROGRESS NOTES
Adult Annual Physical  Name: Ting Vasques      : 1975      MRN: [id number]  Encounter Provider: Rosa Blankenship MD  Encounter Date: 12/10/2024   Encounter department: [facility] PRIMARY CARE    Assessment & Plan  Annual physical exam         Prediabetes  - improved, recent A1c down to 5.4.        B12 deficiency  - low at 140 in October, will recheck at this time. Currently doing weekly injections. Will likely need monthly thereafter, indefinitely, due to history of RNY bypass     Orders:  •  Vitamin B12; Future    Vitamin D deficiency    Orders:  •  Vitamin D 25 hydroxy; Future    History of Toni-en-Y gastric bypass         Essential hypertension         Class 3 severe obesity due to excess calories with serious comorbidity and body mass index (BMI) of 40.0 to 44.9 in adult (HCC)         CHRISTIANO (obstructive sleep apnea)         Hypokalemia    Orders:  •  Basic metabolic panel; Future    Posterior cervical adenopathy           Immunizations and preventive care screenings were discussed with patient today. Appropriate education was printed on patient's after visit summary.    Counseling:  Alcohol/drug use: discussed moderation in alcohol intake, the recommendations for healthy alcohol use, and avoidance of illicit drug use.  Dental Health: discussed importance of regular tooth brushing, flossing, and dental visits.  Injury prevention: discussed safety/seat belts, safety helmets, smoke detectors, carbon monoxide detectors, and smoking near bedding or upholstery.  Exercise: the importance of regular exercise/physical activity was discussed. Recommend exercise 3-5 times per week for at least 30 minutes.          History of Present Illness     Adult Annual Physical:  Patient presents for annual physical. 48yo female with history of HTN, HLD, pre-diabetes, CHRISTIANO, s/p RNY bypass here for physical.    Lost over 70 lbs with Zepbound but her new insurance does not have weight loss benefit. A1c borderline  diabetic before starting Zepbound and now in normal range. Lipid profile also improved. .     Diet and Physical Activity:  - Diet/Nutrition: well balanced diet and diabetic diet.  - Exercise: walking and moderate cardiovascular exercise.    Depression Screening:  - PHQ-2 Score: 0    General Health:  - Sleep: sleeps well.  - Hearing: normal hearing bilateral ears.  - Vision: goes for regular eye exams.  - Dental: regular dental visits.    /GYN Health:  - Follows with GYN: yes.   - Menopause: premenopausal.   - Contraception: menopause.      Review of Systems   Constitutional:  Positive for fatigue. Negative for appetite change, chills and fever.   HENT:  Negative for congestion and sore throat.    Respiratory:  Negative for cough and shortness of breath.    Cardiovascular:  Negative for chest pain, palpitations and leg swelling.   Gastrointestinal:  Positive for constipation. Negative for abdominal pain, diarrhea, nausea and vomiting.   Genitourinary:  Negative for difficulty urinating.   Neurological:  Negative for dizziness and light-headedness.   Hematological:  Positive for adenopathy.     Current Outpatient Medications on File Prior to Visit   Medication Sig Dispense Refill   • acetaminophen (TYLENOL) 500 mg tablet Take 500 mg by mouth every 6 (six) hours as needed for mild pain     • ascorbic acid (VITAMIN C) 250 mg tablet Take 1 tablet (250 mg total) by mouth daily With FeSO4 90 tablet 1   • azelastine (ASTELIN) 0.1 % nasal spray 2 sprays into each nostril 2 (two) times a day Use in each nostril as directed 30 mL 1   • clobetasol (TEMOVATE) 0.05 % cream Apply topically 2 (two) times a day 45 g 1   • clotrimazole-betamethasone (LOTRISONE) 1-0.05 % cream Apply topically 2 (two) times a day for 7 days 15 g 1   • cyanocobalamin 1,000 mcg/mL Inject 1 mL (1,000 mcg total) into a muscle once a week for 4 doses 10 mL 1   • DULoxetine (CYMBALTA) 60 mg delayed release capsule Take 1 capsule (60 mg total) by mouth 2  "(two) times a day 180 capsule 1   • famotidine (PEPCID) 40 MG tablet Take 1 tablet (40 mg total) by mouth daily at bedtime as needed for indigestion or heartburn 90 tablet 1   • fluocinonide (LIDEX) 0.05 % cream Apply topically 2 (two) times a day for 7 days 15 g 0   • folic acid (FOLVITE) 400 mcg tablet Take 1 tablet (400 mcg total) by mouth daily 90 tablet 0   • hydroCHLOROthiazide 25 mg tablet Take 1 tablet (25 mg total) by mouth daily 90 tablet 1   • metoprolol succinate (TOPROL-XL) 50 mg 24 hr tablet Take 0.5 tablets (25 mg total) by mouth daily at bedtime     • montelukast (SINGULAIR) 10 mg tablet Take 1 tablet (10 mg total) by mouth daily at bedtime 90 tablet 2   • mupirocin (BACTROBAN) 2 % ointment Apply topically 3 (three) times a day 22 g 1   • omeprazole (PriLOSEC) 40 MG capsule Take 1 capsule (40 mg total) by mouth daily 90 capsule 2   • ondansetron (ZOFRAN) 8 mg tablet Take 1 tablet (8 mg total) by mouth every 8 (eight) hours as needed for nausea or vomiting 20 tablet 3   • Syringe 1 ML MISC Use every 30 (thirty) days 25 each 0   • Mounjaro 10 MG/0.5ML SOAJ Inject 10 mg under the skin once a week (Patient not taking: Reported on 12/10/2024) 6 mL 0   • potassium chloride (Klor-Con M20) 20 mEq tablet Take 2 tablets (40 mEq total) by mouth 2 (two) times a day for 7 days 28 tablet 0   • [DISCONTINUED] metFORMIN (GLUCOPHAGE) 500 mg tablet Take 1 tablet (500 mg total) by mouth daily with dinner 30 tablet 3     No current facility-administered medications on file prior to visit.        Objective   /64   Pulse 92   Temp 98.1 °F (36.7 °C) (Temporal)   Resp 18   Ht 5' 7\" (1.702 m)   Wt 81.9 kg (180 lb 8 oz)   LMP 03/14/2024 Comment: per pt has MIRENA in place  SpO2 99%   BMI 28.27 kg/m²     Physical Exam  Vitals reviewed.   Constitutional:       General: She is not in acute distress.     Appearance: She is normal weight.   HENT:      Head: Normocephalic and atraumatic.      Right Ear: Tympanic " membrane, ear canal and external ear normal.      Left Ear: Tympanic membrane, ear canal and external ear normal.      Mouth/Throat:      Pharynx: No oropharyngeal exudate or posterior oropharyngeal erythema.   Cardiovascular:      Rate and Rhythm: Normal rate and regular rhythm.      Heart sounds: No murmur heard.     No friction rub. No gallop.   Pulmonary:      Effort: Pulmonary effort is normal. No respiratory distress.      Breath sounds: No wheezing, rhonchi or rales.   Abdominal:      General: Abdomen is flat. Bowel sounds are normal. There is no distension.      Palpations: Abdomen is soft. There is no mass.      Tenderness: There is no abdominal tenderness. There is no guarding or rebound.   Lymphadenopathy:      Cervical: Cervical adenopathy present.      Right cervical: Posterior cervical adenopathy present. No superficial cervical adenopathy.     Left cervical: No superficial or posterior cervical adenopathy.      Upper Body:      Right upper body: No supraclavicular adenopathy.      Left upper body: No supraclavicular adenopathy.   Skin:     General: Skin is warm and dry.   Neurological:      General: No focal deficit present.      Mental Status: She is alert.   Psychiatric:         Mood and Affect: Mood and affect normal.         Behavior: Behavior normal. Behavior is cooperative.

## 2024-12-10 NOTE — H&P
History and Physical - [facility]  Ting Vasques 49 y.o. female MRN: [id number]                  HPI: Ting Vasques is a 49 y.o. year old female who presents for history of colon polyps      REVIEW OF SYSTEMS: Per the HPI, and otherwise unremarkable.    Historical Information   Past Medical History:   Diagnosis Date    Anemia 2011    Chronic back pain     secondary to spinal stenosis    Endometrial thickening on ultrasound     Environmental allergies     GERD (gastroesophageal reflux disease)     Hypertension     IUD (intrauterine device) in place     Mirena in place at this time per pt    Migraine     Obesity 1975    Prediabetes     Spinal stenosis      Past Surgical History:   Procedure Laterality Date    BARIATRIC SURGERY  2011    CHOLECYSTECTOMY      COLONOSCOPY      GASTRIC BYPASS  2011    MASS EXCISION Left 9/1/2023    Procedure: EXCISION BIOPSY TISSUE LESION/MASS LOWER EXTREMITY/thigh;  Surgeon: Quentin Dhaliwal MD;  Location: [facility];  Service: General    AL HYSTEROSCOPY BX ENDOMETRIUM&/POLYPC W/WO D&C N/A 1/18/2024    Procedure: HYSTEROSCOPY, (D&C) , POLYPECTOMY, PSB  MIRENA INSERTION;  Surgeon: Portia Barbosa MD;  Location: [facility];  Service: Gynecology    AL LAPS TOTAL HYSTERECT 250 GM/< W/RMVL TUBE/OVARY N/A 3/22/2024    Procedure: (LTH) W/ ROBOTICS, BILATERAL SALPINGO-OOPHORECTOMY ;  (EUA);  Surgeon: Virginia Rincon MD;  Location: [facility];  Service: Gynecology Oncology    US GUIDED THYROID BIOPSY  5/30/2024     Social History   Social History     Substance and Sexual Activity   Alcohol Use Yes    Comment: socially - couple times per year     Social History     Substance and Sexual Activity   Drug Use No    Comment: Denies any drug use per pt     Social History     Tobacco Use   Smoking Status Never   Smokeless Tobacco Never   Tobacco Comments    Never a smoker or use of any tobacco products per pt      Family History   Problem Relation Age of Onset    Diabetes  Mother     Hypertension Mother     Endometrial cancer Mother 74    Cancer Mother         Endometrial    Diabetes Father     Hypertension Father     Heart disease Father     Prostate cancer Father     Cancer Father         Prostate    Hypertension Sister     No Known Problems Sister     Hypertension Maternal Grandmother     Heart disease Maternal Grandmother     Diabetes Maternal Grandmother     No Known Problems Maternal Grandfather     Breast cancer additional onset Paternal Grandmother 70    Breast cancer Paternal Grandmother 66    Cancer Paternal Grandmother         Laryngeal and breast    Prostate cancer Paternal Grandfather     Bone cancer Paternal Grandfather     Cancer Paternal Grandfather         Prostate    Breast cancer Paternal Aunt 68    Anesthesia problems Neg Hx        Meds/Allergies       Current Outpatient Medications:     acetaminophen (TYLENOL) 500 mg tablet    ascorbic acid (VITAMIN C) 250 mg tablet    azelastine (ASTELIN) 0.1 % nasal spray    clobetasol (TEMOVATE) 0.05 % cream    clotrimazole-betamethasone (LOTRISONE) 1-0.05 % cream    cyanocobalamin 1,000 mcg/mL    dicyclomine (BENTYL) 10 mg capsule    DULoxetine (CYMBALTA) 60 mg delayed release capsule    famotidine (PEPCID) 40 MG tablet    fluocinonide (LIDEX) 0.05 % cream    folic acid (FOLVITE) 400 mcg tablet    hydroCHLOROthiazide 25 mg tablet    hydrOXYzine HCL (ATARAX) 25 mg tablet    ibuprofen (MOTRIN) 200 mg tablet    levocetirizine (XYZAL) 5 MG tablet    metoprolol succinate (TOPROL-XL) 50 mg 24 hr tablet    montelukast (SINGULAIR) 10 mg tablet    Mounjaro 10 MG/0.5ML SOAJ    mupirocin (BACTROBAN) 2 % ointment    omeprazole (PriLOSEC) 40 MG capsule    ondansetron (ZOFRAN) 8 mg tablet    potassium chloride (Klor-Con M20) 20 mEq tablet    sucralfate (CARAFATE) 1 g/10 mL suspension    Syringe 1 ML MISC    tirzepatide (Zepbound) 10 mg/0.5 mL auto-injector    Current Facility-Administered Medications:     lactated ringers infusion, 125  mL/hr, Intravenous, Continuous    Allergies   Allergen Reactions    Bee Venom Hives    Pollen Extract Allergic Rhinitis       Objective     LMP 03/14/2024 Comment: per pt has MIRENA in place      PHYSICAL EXAM    Gen: NAD  Head: NCAT  CV: RRR  CHEST: Clear  ABD: soft, NT/ND  EXT: no edema      ASSESSMENT/PLAN:  This is a 49 y.o. year old female here for colonoscopy, and she is stable and optimized for her procedure.         The patient is a 67y Male complaining of urinary/bladder trouble.

## 2024-12-10 NOTE — ANESTHESIA POSTPROCEDURE EVALUATION
Post-Op Assessment Note    CV Status:  Stable  Pain Score: 0    Pain management: adequate       Mental Status:  Alert and awake   Hydration Status:  Euvolemic   PONV Controlled:  Controlled   Airway Patency:  Patent     Post Op Vitals Reviewed: Yes    No anethesia notable event occurred.    Staff: CRNA         Last Filed PACU Vitals:  Vitals Value Taken Time   Temp 97.3 °F (36.3 °C) 12/10/24 0929   Pulse 72 12/10/24 0945   BP 89/54 12/10/24 0941   Resp 17 12/10/24 0945   SpO2 99 % 12/10/24 0944   Vitals shown include unfiled device data.    Modified Gamal:  Activity: 2 (12/10/2024  9:35 AM)  Respiration: 2 (12/10/2024  9:35 AM)  Circulation: 2 (12/10/2024  9:35 AM)  Consciousness: 2 (12/10/2024  9:35 AM)  Oxygen Saturation: 2 (12/10/2024  9:35 AM)  Modified Gamal Score: 10 (12/10/2024  9:35 AM)

## 2024-12-10 NOTE — ASSESSMENT & PLAN NOTE
- low at 140 in October, will recheck at this time. Currently doing weekly injections. Will likely need monthly thereafter, indefinitely, due to history of RNY bypass     Orders:  •  Vitamin B12; Future

## 2024-12-11 ENCOUNTER — HOSPITAL ENCOUNTER (OUTPATIENT)
Dept: NON INVASIVE DIAGNOSTICS | Facility: HOSPITAL | Age: 49
Discharge: HOME | End: 2024-12-11
Attending: INTERNAL MEDICINE

## 2024-12-11 NOTE — PATIENT COMMUNICATION
Patient message received, saw provider the next day, provider aware new insurance does not cover WM medications.

## 2024-12-13 PROCEDURE — 88305 TISSUE EXAM BY PATHOLOGIST: CPT | Performed by: PATHOLOGY

## 2024-12-16 ENCOUNTER — PATIENT MESSAGE (OUTPATIENT)
Dept: FAMILY MEDICINE CLINIC | Facility: CLINIC | Age: 49
End: 2024-12-16

## 2024-12-16 ENCOUNTER — RESULTS FOLLOW-UP (OUTPATIENT)
Dept: GASTROENTEROLOGY | Facility: CLINIC | Age: 49
End: 2024-12-16

## 2024-12-17 ENCOUNTER — PATIENT MESSAGE (OUTPATIENT)
Dept: FAMILY MEDICINE CLINIC | Facility: CLINIC | Age: 49
End: 2024-12-17

## 2024-12-17 DIAGNOSIS — G47.33 OSA (OBSTRUCTIVE SLEEP APNEA): ICD-10-CM

## 2024-12-17 DIAGNOSIS — R73.03 PREDIABETES: Primary | ICD-10-CM

## 2024-12-17 DIAGNOSIS — E66.01 CLASS 3 SEVERE OBESITY DUE TO EXCESS CALORIES WITH SERIOUS COMORBIDITY AND BODY MASS INDEX (BMI) OF 40.0 TO 44.9 IN ADULT (HCC): ICD-10-CM

## 2024-12-17 DIAGNOSIS — Z98.84 HISTORY OF ROUX-EN-Y GASTRIC BYPASS: ICD-10-CM

## 2024-12-17 DIAGNOSIS — E66.813 CLASS 3 SEVERE OBESITY DUE TO EXCESS CALORIES WITH SERIOUS COMORBIDITY AND BODY MASS INDEX (BMI) OF 40.0 TO 44.9 IN ADULT (HCC): ICD-10-CM

## 2024-12-17 DIAGNOSIS — I10 ESSENTIAL HYPERTENSION: ICD-10-CM

## 2024-12-18 RX ORDER — TIRZEPATIDE 10 MG/.5ML
10 INJECTION, SOLUTION SUBCUTANEOUS WEEKLY
Qty: 2 ML | Refills: 3 | Status: SHIPPED | OUTPATIENT
Start: 2024-12-18

## 2024-12-26 ENCOUNTER — HOSPITAL ENCOUNTER (OUTPATIENT)
Dept: MAMMOGRAPHY | Facility: HOSPITAL | Age: 49
End: 2024-12-26
Attending: INTERNAL MEDICINE
Payer: COMMERCIAL

## 2024-12-26 DIAGNOSIS — Z12.31 ENCOUNTER FOR SCREENING MAMMOGRAM FOR MALIGNANT NEOPLASM OF BREAST: ICD-10-CM

## 2024-12-26 PROCEDURE — 77067 SCR MAMMO BI INCL CAD: CPT

## 2024-12-26 PROCEDURE — 77063 BREAST TOMOSYNTHESIS BI: CPT

## 2025-01-06 ENCOUNTER — TELEPHONE (OUTPATIENT)
Age: 50
End: 2025-01-06

## 2025-01-06 NOTE — TELEPHONE ENCOUNTER
Attempted to call patient to hopefully clarify, but just static on the line. Will attempt again later.

## 2025-01-06 NOTE — TELEPHONE ENCOUNTER
Patient recently got new insurance. Requesting prior auth so she does not need to take compounded version.    Ronnie  [facility] - [id number]  Group # - [id number]  Member ID - [id number]      Reason for call:   [x] Prior Auth  [] Other:     Caller:  [x] Patient  [] Pharmacy  Name:   Address:   Callback Number:     Medication: Tirzepatide (Zepbound) 10mg/0.5 ml auto -injector     Dose/Frequency: inject 0.5 ml under skin once weekly     Quantity: 2ml    Ordering Provider:   [x] PCP/Provider - Rosa Blankenship MD   [] Speciality/Provider -

## 2025-01-06 NOTE — TELEPHONE ENCOUNTER
I am confused about this. She states her insurance doesn't cover Zepbound or weight loss meds, so she was looking into compounded version.

## 2025-01-06 NOTE — TELEPHONE ENCOUNTER
Medication PA department is unable to submit PA for Zepbound 10 mg, due to the medication being sent to a compounding pharmacy.  Office would have to submit the PA.

## 2025-01-17 ENCOUNTER — HOSPITAL ENCOUNTER (EMERGENCY)
Facility: HOSPITAL | Age: 50
Discharge: HOME | End: 2025-01-18
Attending: EMERGENCY MEDICINE
Payer: COMMERCIAL

## 2025-01-17 ENCOUNTER — APPOINTMENT (EMERGENCY)
Dept: ULTRASOUND IMAGING | Facility: HOSPITAL | Age: 50
End: 2025-01-17
Payer: COMMERCIAL

## 2025-01-17 ENCOUNTER — APPOINTMENT (EMERGENCY)
Dept: CT IMAGING | Facility: HOSPITAL | Age: 50
End: 2025-01-17
Payer: COMMERCIAL

## 2025-01-17 ENCOUNTER — APPOINTMENT (EMERGENCY)
Dept: RADIOLOGY | Facility: HOSPITAL | Age: 50
End: 2025-01-17
Payer: COMMERCIAL

## 2025-01-17 DIAGNOSIS — E87.6 HYPOKALEMIA: Primary | ICD-10-CM

## 2025-01-17 DIAGNOSIS — R11.2 NAUSEA AND VOMITING: ICD-10-CM

## 2025-01-17 DIAGNOSIS — R07.9 CHEST PAIN: ICD-10-CM

## 2025-01-17 DIAGNOSIS — R74.01 TRANSAMINITIS: ICD-10-CM

## 2025-01-17 LAB
ALBUMIN SERPL BCG-MCNC: 4.4 G/DL (ref 3.5–5)
ALP SERPL-CCNC: 120 U/L (ref 34–104)
ALT SERPL W P-5'-P-CCNC: 735 U/L (ref 7–52)
ANION GAP SERPL CALCULATED.3IONS-SCNC: 9 MMOL/L (ref 4–13)
AST SERPL W P-5'-P-CCNC: 1051 U/L (ref 13–39)
BASOPHILS # BLD AUTO: 0.01 THOUSANDS/ΜL (ref 0–0.1)
BASOPHILS NFR BLD AUTO: 0 % (ref 0–1)
BILIRUB SERPL-MCNC: 1.4 MG/DL (ref 0.2–1)
BILIRUB UR QL STRIP: ABNORMAL
BUN SERPL-MCNC: 15 MG/DL (ref 5–25)
CALCIUM SERPL-MCNC: 9.6 MG/DL (ref 8.4–10.2)
CARDIAC TROPONIN I PNL SERPL HS: 3 NG/L (ref ?–50)
CHLORIDE SERPL-SCNC: 99 MMOL/L (ref 96–108)
CLARITY UR: CLEAR
CO2 SERPL-SCNC: 30 MMOL/L (ref 21–32)
COLOR UR: ABNORMAL
CREAT SERPL-MCNC: 0.54 MG/DL (ref 0.6–1.3)
EOSINOPHIL # BLD AUTO: 0.03 THOUSAND/ΜL (ref 0–0.61)
EOSINOPHIL NFR BLD AUTO: 0 % (ref 0–6)
ERYTHROCYTE [DISTWIDTH] IN BLOOD BY AUTOMATED COUNT: 13.1 % (ref 11.6–15.1)
FLUAV RNA RESP QL NAA+PROBE: NEGATIVE
FLUBV RNA RESP QL NAA+PROBE: NEGATIVE
GFR SERPL CREATININE-BSD FRML MDRD: 111 ML/MIN/1.73SQ M
GLUCOSE SERPL-MCNC: 150 MG/DL (ref 65–140)
GLUCOSE UR STRIP-MCNC: NEGATIVE MG/DL
HCT VFR BLD AUTO: 48.9 % (ref 34.8–46.1)
HGB BLD-MCNC: 16.1 G/DL (ref 11.5–15.4)
HGB UR QL STRIP.AUTO: NEGATIVE
IMM GRANULOCYTES # BLD AUTO: 0.03 THOUSAND/UL (ref 0–0.2)
IMM GRANULOCYTES NFR BLD AUTO: 0 % (ref 0–2)
KETONES UR STRIP-MCNC: ABNORMAL MG/DL
LEUKOCYTE ESTERASE UR QL STRIP: NEGATIVE
LIPASE SERPL-CCNC: 34 U/L (ref 11–82)
LYMPHOCYTES # BLD AUTO: 0.17 THOUSANDS/ΜL (ref 0.6–4.47)
LYMPHOCYTES NFR BLD AUTO: 2 % (ref 14–44)
MCH RBC QN AUTO: 27.1 PG (ref 26.8–34.3)
MCHC RBC AUTO-ENTMCNC: 32.9 G/DL (ref 31.4–37.4)
MCV RBC AUTO: 82 FL (ref 82–98)
MONOCYTES # BLD AUTO: 0.42 THOUSAND/ΜL (ref 0.17–1.22)
MONOCYTES NFR BLD AUTO: 5 % (ref 4–12)
NEUTROPHILS # BLD AUTO: 7.42 THOUSANDS/ΜL (ref 1.85–7.62)
NEUTS SEG NFR BLD AUTO: 93 % (ref 43–75)
NITRITE UR QL STRIP: NEGATIVE
NRBC BLD AUTO-RTO: 0 /100 WBCS
PH UR STRIP.AUTO: 6.5 [PH]
PLATELET # BLD AUTO: 217 THOUSANDS/UL (ref 149–390)
PMV BLD AUTO: 9.9 FL (ref 8.9–12.7)
POTASSIUM SERPL-SCNC: 2.9 MMOL/L (ref 3.5–5.3)
PROT SERPL-MCNC: 6.6 G/DL (ref 6.4–8.4)
PROT UR STRIP-MCNC: NEGATIVE MG/DL
RBC # BLD AUTO: 5.95 MILLION/UL (ref 3.81–5.12)
RSV RNA RESP QL NAA+PROBE: NEGATIVE
SARS-COV-2 RNA RESP QL NAA+PROBE: NEGATIVE
SODIUM SERPL-SCNC: 138 MMOL/L (ref 135–147)
SP GR UR STRIP.AUTO: 1.02
TSH SERPL DL<=0.05 MIU/L-ACNC: 0.44 UIU/ML (ref 0.45–4.5)
UROBILINOGEN UR QL STRIP.AUTO: 2 E.U./DL
WBC # BLD AUTO: 8.08 THOUSAND/UL (ref 4.31–10.16)

## 2025-01-17 PROCEDURE — 81003 URINALYSIS AUTO W/O SCOPE: CPT | Performed by: EMERGENCY MEDICINE

## 2025-01-17 PROCEDURE — 0241U HB NFCT DS VIR RESP RNA 4 TRGT: CPT | Performed by: EMERGENCY MEDICINE

## 2025-01-17 PROCEDURE — 80053 COMPREHEN METABOLIC PANEL: CPT

## 2025-01-17 PROCEDURE — 84443 ASSAY THYROID STIM HORMONE: CPT | Performed by: EMERGENCY MEDICINE

## 2025-01-17 PROCEDURE — 96361 HYDRATE IV INFUSION ADD-ON: CPT

## 2025-01-17 PROCEDURE — 76705 ECHO EXAM OF ABDOMEN: CPT

## 2025-01-17 PROCEDURE — 99285 EMERGENCY DEPT VISIT HI MDM: CPT | Performed by: EMERGENCY MEDICINE

## 2025-01-17 PROCEDURE — 84484 ASSAY OF TROPONIN QUANT: CPT

## 2025-01-17 PROCEDURE — 96365 THER/PROPH/DIAG IV INF INIT: CPT

## 2025-01-17 PROCEDURE — 96375 TX/PRO/DX INJ NEW DRUG ADDON: CPT

## 2025-01-17 PROCEDURE — 71045 X-RAY EXAM CHEST 1 VIEW: CPT

## 2025-01-17 PROCEDURE — 85025 COMPLETE CBC W/AUTO DIFF WBC: CPT

## 2025-01-17 PROCEDURE — 99285 EMERGENCY DEPT VISIT HI MDM: CPT

## 2025-01-17 PROCEDURE — 36415 COLL VENOUS BLD VENIPUNCTURE: CPT

## 2025-01-17 PROCEDURE — 93005 ELECTROCARDIOGRAM TRACING: CPT

## 2025-01-17 PROCEDURE — 83690 ASSAY OF LIPASE: CPT

## 2025-01-17 RX ORDER — SODIUM CHLORIDE 9 MG/ML
3 INJECTION INTRAVENOUS
Status: DISCONTINUED | OUTPATIENT
Start: 2025-01-17 | End: 2025-01-18 | Stop reason: HOSPADM

## 2025-01-17 RX ORDER — POTASSIUM CHLORIDE 14.9 MG/ML
20 INJECTION INTRAVENOUS
Status: COMPLETED | OUTPATIENT
Start: 2025-01-17 | End: 2025-01-18

## 2025-01-17 RX ORDER — ONDANSETRON 2 MG/ML
4 INJECTION INTRAMUSCULAR; INTRAVENOUS ONCE
Status: COMPLETED | OUTPATIENT
Start: 2025-01-17 | End: 2025-01-17

## 2025-01-17 RX ADMIN — ONDANSETRON 4 MG: 2 INJECTION INTRAMUSCULAR; INTRAVENOUS at 22:19

## 2025-01-17 RX ADMIN — POTASSIUM CHLORIDE 20 MEQ: 200 INJECTION, SOLUTION INTRAVENOUS at 23:10

## 2025-01-17 RX ADMIN — SODIUM CHLORIDE 1000 ML: 0.9 INJECTION, SOLUTION INTRAVENOUS at 22:33

## 2025-01-18 ENCOUNTER — APPOINTMENT (EMERGENCY)
Dept: CT IMAGING | Facility: HOSPITAL | Age: 50
End: 2025-01-18
Payer: COMMERCIAL

## 2025-01-18 VITALS
HEART RATE: 95 BPM | WEIGHT: 180 LBS | BODY MASS INDEX: 28.25 KG/M2 | TEMPERATURE: 98.4 F | HEIGHT: 67 IN | RESPIRATION RATE: 20 BRPM | SYSTOLIC BLOOD PRESSURE: 125 MMHG | OXYGEN SATURATION: 96 % | DIASTOLIC BLOOD PRESSURE: 73 MMHG

## 2025-01-18 LAB
2HR DELTA HS TROPONIN: 0 NG/L
ATRIAL RATE: 100 BPM
ATRIAL RATE: 96 BPM
CARDIAC TROPONIN I PNL SERPL HS: 3 NG/L (ref ?–50)
P AXIS: 66 DEGREES
P AXIS: 69 DEGREES
PR INTERVAL: 158 MS
PR INTERVAL: 160 MS
QRS AXIS: 72 DEGREES
QRS AXIS: 73 DEGREES
QRSD INTERVAL: 66 MS
QRSD INTERVAL: 68 MS
QT INTERVAL: 300 MS
QT INTERVAL: 344 MS
QTC INTERVAL: 387 MS
QTC INTERVAL: 434 MS
T WAVE AXIS: 12 DEGREES
T WAVE AXIS: 60 DEGREES
VENTRICULAR RATE: 100 BPM
VENTRICULAR RATE: 96 BPM

## 2025-01-18 PROCEDURE — 74177 CT ABD & PELVIS W/CONTRAST: CPT

## 2025-01-18 PROCEDURE — 96366 THER/PROPH/DIAG IV INF ADDON: CPT

## 2025-01-18 PROCEDURE — 96374 THER/PROPH/DIAG INJ IV PUSH: CPT

## 2025-01-18 PROCEDURE — 93010 ELECTROCARDIOGRAM REPORT: CPT | Performed by: INTERNAL MEDICINE

## 2025-01-18 RX ORDER — ONDANSETRON 4 MG/1
4 TABLET, FILM COATED ORAL EVERY 6 HOURS
Qty: 12 TABLET | Refills: 0 | Status: SHIPPED | OUTPATIENT
Start: 2025-01-18

## 2025-01-18 RX ORDER — METOCLOPRAMIDE HYDROCHLORIDE 5 MG/ML
10 INJECTION INTRAMUSCULAR; INTRAVENOUS ONCE
Status: COMPLETED | OUTPATIENT
Start: 2025-01-18 | End: 2025-01-18

## 2025-01-18 RX ADMIN — POTASSIUM CHLORIDE 20 MEQ: 200 INJECTION, SOLUTION INTRAVENOUS at 01:15

## 2025-01-18 RX ADMIN — METOCLOPRAMIDE 10 MG: 5 INJECTION, SOLUTION INTRAMUSCULAR; INTRAVENOUS at 01:25

## 2025-01-18 RX ADMIN — IOHEXOL 100 ML: 350 INJECTION, SOLUTION INTRAVENOUS at 00:22

## 2025-01-18 NOTE — DISCHARGE INSTRUCTIONS
Your liver enzymes were found to be elevated today, you need to follow-up with your primary care physician and gastroenterologist in regards to this finding.    Your CT scan was negative for any acute findings    Recommend Zofran as needed for nausea and vomiting  You may take Tylenol and ibuprofen as needed for pain    Return for any worsening symptoms including worsening pain, uncontrollable vomiting, difficulty breathing, or any other concerning symptoms

## 2025-01-18 NOTE — ED PROVIDER NOTES
Time reflects when diagnosis was documented in both MDM as applicable and the Disposition within this note       Time User Action Codes Description Comment    1/18/2025  2:14 AM Check, Armando Hoang [E87.6] Hypokalemia     1/18/2025  2:14 AM Check, Armando Viktor [R74.01] Transaminitis     1/18/2025  2:15 AM Check, Armando Viktor [R07.9] Chest pain     1/18/2025  2:15 AM Check, Armando Viktor [R11.2] Nausea and vomiting           ED Disposition       ED Disposition   Discharge    Condition   Stable    Date/Time   Sat Jan 18, 2025  2:15 AM    Comment   Ting Vasques discharge to home/self care.                   Assessment & Plan       Medical Decision Making  This patient presents with chest pain that is very unlikely angina or acute coronary syndrome.  Patient was also experiencing vomiting which has resolved medications given in the ED.  Hypokalemia treated with potassium.  The emergency department evaluation has not identified any cause for suspicion that this chest pain has a cardiac etiology. Based on their history, EKG (which showed no evidence of ischemia or infarction) and imaging, in addition to the patient's physical exam, I see no evidence at this time for a malignant etiology for the patient's chest pain. There is no acute evidence for pulmonary embolus, acute myocardial infarction, pneumothorax, Boerhaeve syndrome, cardiac tamponade, thoracic artery dissection, or any other emergent cardiac, pulmonary or aortic pathology. Given the low pre-test probability for cardiac etiology of chest pain and the absence of any sign of ischemia or infarction, discharge for outpatient follow-up and further evaluation is reasonable.    I have explained to the patient that even though a cardiac problem is very unlikely, follow-up and further testing is required to reduce further the already small uncertainty that exists. Other life-threatening diagnoses have been considered. The patient understands the need to return immediately if  their symptoms worsen or they develop any new symptoms, and not to engage in any significant exertional activity until follow-up is obtained.      Amount and/or Complexity of Data Reviewed  External Data Reviewed: notes.  Labs: ordered.  Radiology: ordered.  ECG/medicine tests: ordered and independent interpretation performed.    Risk  OTC drugs.  Prescription drug management.             Medications   ondansetron (ZOFRAN) injection 4 mg (4 mg Intravenous Given 1/17/25 2219)   sodium chloride 0.9 % bolus 1,000 mL (0 mL Intravenous Stopped 1/18/25 0332)   potassium chloride 20 mEq IVPB (premix) (0 mEq Intravenous Stopped 1/18/25 0332)   iohexol (OMNIPAQUE) 350 MG/ML injection (SINGLE-DOSE) 100 mL (100 mL Intravenous Given 1/18/25 0022)   metoclopramide (REGLAN) injection 10 mg (10 mg Intravenous Given 1/18/25 0125)       ED Risk Strat Scores   HEART Risk Score      Flowsheet Row Most Recent Value   Heart Score Risk Calculator    History 0 Filed at: 01/19/2025 1532   ECG 1 Filed at: 01/19/2025 1532   Age 1 Filed at: 01/19/2025 1532   Risk Factors 1 Filed at: 01/19/2025 1532   Troponin 0 Filed at: 01/19/2025 1532   HEART Score 3 Filed at: 01/19/2025 1532          HEART Risk Score      Flowsheet Row Most Recent Value   Heart Score Risk Calculator    History 0 Filed at: 01/19/2025 1532   ECG 1 Filed at: 01/19/2025 1532   Age 1 Filed at: 01/19/2025 1532   Risk Factors 1 Filed at: 01/19/2025 1532   Troponin 0 Filed at: 01/19/2025 1532   HEART Score 3 Filed at: 01/19/2025 1532                            SBIRT 20yo+      Flowsheet Row Most Recent Value   Initial Alcohol Screen: US AUDIT-C     1. How often do you have a drink containing alcohol? 0 Filed at: 01/17/2025 2148   2. How many drinks containing alcohol do you have on a typical day you are drinking?  0 Filed at: 01/17/2025 2148   3b. FEMALE Any Age, or MALE 65+: How often do you have 4 or more drinks on one occassion? 0 Filed at: 01/17/2025 2148   Audit-C Score 0  Filed at: 01/17/2025 2148   KINGS: How many times in the past year have you...    Used an illegal drug or used a prescription medication for non-medical reasons? Never Filed at: 01/17/2025 2148                            History of Present Illness       Chief Complaint   Patient presents with    Vomiting     Vomiting started around 3pm, now having chest pain.       Past Medical History:   Diagnosis Date    Anemia 2011    Chronic back pain     secondary to spinal stenosis    Endometrial thickening on ultrasound     Environmental allergies     GERD (gastroesophageal reflux disease)     Hypertension     IUD (intrauterine device) in place     Mirena in place at this time per pt    Migraine     Obesity 1975    Prediabetes     Spinal stenosis       Past Surgical History:   Procedure Laterality Date    BARIATRIC SURGERY  2011    CHOLECYSTECTOMY      COLONOSCOPY      GASTRIC BYPASS  2011    MASS EXCISION Left 9/1/2023    Procedure: EXCISION BIOPSY TISSUE LESION/MASS LOWER EXTREMITY/thigh;  Surgeon: Quentin Dhaliwal MD;  Location: [facility];  Service: General    CT HYSTEROSCOPY BX ENDOMETRIUM&/POLYPC W/WO D&C N/A 1/18/2024    Procedure: HYSTEROSCOPY, (D&C) , POLYPECTOMY, PSB  MIRENA INSERTION;  Surgeon: Portia Barbosa MD;  Location: [facility];  Service: Gynecology    CT LAPS TOTAL HYSTERECT 250 GM/< W/RMVL TUBE/OVARY N/A 3/22/2024    Procedure: (LTH) W/ ROBOTICS, BILATERAL SALPINGO-OOPHORECTOMY ;  (EUA);  Surgeon: Virginia Rincon MD;  Location: [facility];  Service: Gynecology Oncology    US GUIDED THYROID BIOPSY  5/30/2024      Family History   Problem Relation Age of Onset    Diabetes Mother     Hypertension Mother     Endometrial cancer Mother 74    Cancer Mother         Endometrial    Diabetes Father     Hypertension Father     Heart disease Father     Prostate cancer Father     Cancer Father         Prostate    Hypertension Sister     No Known Problems Sister     Hypertension Maternal Grandmother     Heart  disease Maternal Grandmother     Diabetes Maternal Grandmother     No Known Problems Maternal Grandfather     Breast cancer additional onset Paternal Grandmother 70    Breast cancer Paternal Grandmother 66    Cancer Paternal Grandmother         Laryngeal and breast    Prostate cancer Paternal Grandfather     Bone cancer Paternal Grandfather     Cancer Paternal Grandfather         Prostate    Breast cancer Paternal Aunt 68    Anesthesia problems Neg Hx       Social History     Tobacco Use    Smoking status: Never    Smokeless tobacco: Never    Tobacco comments:     Never a smoker or use of any tobacco products per pt    Vaping Use    Vaping status: Never Used   Substance Use Topics    Alcohol use: Yes     Comment: socially - couple times per year    Drug use: No     Comment: Denies any drug use per pt      E-Cigarette/Vaping    E-Cigarette Use Never User     Comments Denies any use per pt       E-Cigarette/Vaping Substances    Nicotine No     THC No     CBD No     Flavoring No     Other No     Unknown No       I have reviewed and agree with the history as documented.     49-year-old female presents with general torso complaints.  Chest pain, abdominal discomfort, persistent vomiting.  Has been persistent since 3 PM.  Initially started as just the vomiting and the pain has increased since that time.  No sick contacts.  No trauma to the area.  Never had this before.        Review of Systems   Constitutional:  Positive for activity change, chills and fatigue.   HENT:  Negative for congestion.    Eyes:  Negative for visual disturbance.   Cardiovascular:  Positive for chest pain. Negative for palpitations and leg swelling.   Gastrointestinal:  Positive for nausea and vomiting. Negative for diarrhea.   Genitourinary:  Negative for dysuria.   Skin:  Negative for rash.   Neurological:  Negative for dizziness, weakness and numbness.           Objective       ED Triage Vitals [01/17/25 2146]   Temperature Pulse Blood Pressure  Respirations SpO2 Patient Position - Orthostatic VS   98.4 °F (36.9 °C) 100 125/73 18 100 % Lying      Temp Source Heart Rate Source BP Location FiO2 (%) Pain Score    Temporal Monitor Left arm -- 9      Vitals      Date and Time Temp Pulse SpO2 Resp BP Pain Score FACES Pain Rating User   01/18/25 0330 -- 95 96 % 20 -- -- -- TG   01/17/25 2146 98.4 °F (36.9 °C) 100 100 % 18 125/73 9 -- TG            Physical Exam  Constitutional:       General: She is not in acute distress.     Appearance: She is well-developed. She is not ill-appearing, toxic-appearing or diaphoretic.   HENT:      Head: Normocephalic and atraumatic.   Eyes:      Conjunctiva/sclera: Conjunctivae normal.      Pupils: Pupils are equal, round, and reactive to light.   Cardiovascular:      Rate and Rhythm: Normal rate and regular rhythm.      Heart sounds: Normal heart sounds.   Pulmonary:      Effort: Pulmonary effort is normal. No respiratory distress.      Breath sounds: Normal breath sounds.   Abdominal:      General: Bowel sounds are normal.      Palpations: Abdomen is soft.   Musculoskeletal:         General: Normal range of motion.      Cervical back: Normal range of motion and neck supple.   Skin:     General: Skin is warm and dry.      Capillary Refill: Capillary refill takes less than 2 seconds.   Neurological:      Mental Status: She is alert and oriented to person, place, and time.   Psychiatric:         Behavior: Behavior normal.         Results Reviewed       Procedure Component Value Units Date/Time    HS Troponin I 2hr [[id number]]  (Normal) Collected: 01/17/25 2348    Lab Status: Final result Specimen: Blood from Arm, Left Updated: 01/18/25 0100     hs TnI 2hr 3 ng/L      Delta 2hr hsTnI 0 ng/L     FLU/RSV/COVID - if FLU/RSV clinically relevant (2hr TAT) [[id number]]  (Normal) Collected: 01/17/25 2231    Lab Status: Final result Specimen: Nares from Nose Updated: 01/17/25 2317     SARS-CoV-2 Negative     INFLUENZA A PCR Negative      INFLUENZA B PCR Negative     RSV PCR Negative    Narrative:      This test has been performed using the CoV-2/Flu/RSV plus assay on the [employer] Gene[employer]pert platform. This test has been validated by the  and verified by the performing laboratory.     This test is designed to amplify and detect the following: nucleocapsid (N), envelope (E), and RNA-dependent RNA polymerase (RdRP) genes of the SARS-CoV-2 genome; matrix (M), basic polymerase (PB2), and acidic protein (PA) segments of the influenza A genome; matrix (M) and non-structural protein (NS) segments of the influenza B genome, and the nucleocapsid genes of RSV A and RSV B.     Positive results are indicative of the presence of Flu A, Flu B, RSV, and/or SARS-CoV-2 RNA. Positive results for SARS-CoV-2 or suspected novel influenza should be reported to state, local, or federal health departments according to local reporting requirements.      All results should be assessed in conjunction with clinical presentation and other laboratory markers for clinical management.     FOR PEDIATRIC PATIENTS - copy/paste COVID Guidelines URL to browser: [email or web address]       TSH, 3rd generation [[id number]]  (Abnormal) Collected: 01/17/25 2231    Lab Status: Final result Specimen: Blood from Arm, Right Updated: 01/17/25 2312     TSH 3RD GENERATON 0.437 uIU/mL     UA w Reflex to Microscopic w Reflex to Culture [[id number]]  (Abnormal) Collected: 01/17/25 2242    Lab Status: Final result Specimen: Urine, Clean Catch Updated: 01/17/25 2248     Color, UA Orange     Clarity, UA Clear     Specific Gravity, UA 1.020     pH, UA 6.5     Leukocytes, UA Negative     Nitrite, UA Negative     Protein, UA Negative mg/dl      Glucose, UA Negative mg/dl      Ketones, UA 40 (2+) mg/dl      Urobilinogen, UA 2.0 E.U./dl      Bilirubin, UA 1+     Occult Blood, UA Negative    Comprehensive metabolic panel [[id number]]  (Abnormal)  Collected: 01/17/25 2154    Lab Status: Final result Specimen: Blood from Arm, Right Updated: 01/17/25 2234     Sodium 138 mmol/L      Potassium 2.9 mmol/L      Chloride 99 mmol/L      CO2 30 mmol/L      ANION GAP 9 mmol/L      BUN 15 mg/dL      Creatinine 0.54 mg/dL      Glucose 150 mg/dL      Calcium 9.6 mg/dL      AST 1,051 U/L       U/L      Alkaline Phosphatase 120 U/L      Total Protein 6.6 g/dL      Albumin 4.4 g/dL      Total Bilirubin 1.40 mg/dL      eGFR 111 ml/min/1.73sq m     Narrative:      National Kid[employer] Disease Foundation guidelines for Chronic Kidney Disease (CKD):     Stage 1 with normal or high GFR (GFR > 90 mL/min/1.73 square meters)    Stage 2 Mild CKD (GFR = 60-89 mL/min/1.73 square meters)    Stage 3A Moderate CKD (GFR = 45-59 mL/min/1.73 square meters)    Stage 3B Moderate CKD (GFR = 30-44 mL/min/1.73 square meters)    Stage 4 Severe CKD (GFR = 15-29 mL/min/1.73 square meters)    Stage 5 End Stage CKD (GFR <15 mL/min/1.73 square meters)  Note: GFR calculation is accurate only with a steady state creatinine    Lipase [[id number]]  (Normal) Collected: 01/17/25 2154    Lab Status: Final result Specimen: Blood from Arm, Right Updated: 01/17/25 2234     Lipase 34 u/L     HS Troponin 0hr (reflex protocol) [[id number]]  (Normal) Collected: 01/17/25 2154    Lab Status: Final result Specimen: Blood from Arm, Right Updated: 01/17/25 2224     hs TnI 0hr 3 ng/L     CBC and differential [[id number]]  (Abnormal) Collected: 01/17/25 2154    Lab Status: Final result Specimen: Blood from Arm, Right Updated: 01/17/25 2211     WBC 8.08 Thousand/uL      RBC 5.95 Million/uL      Hemoglobin 16.1 g/dL      Hematocrit 48.9 %      MCV 82 fL      MCH 27.1 pg      MCHC 32.9 g/dL      RDW 13.1 %      MPV 9.9 fL      Platelets 217 Thousands/uL      nRBC 0 /100 WBCs      Segmented % 93 %      Immature Grans % 0 %      Lymphocytes % 2 %      Monocytes % 5 %      Eosinophils Relative 0 %      Basophils Relative 0 %       Absolute Neutrophils 7.42 Thousands/µL      Absolute Immature Grans 0.03 Thousand/uL      Absolute Lymphocytes 0.17 Thousands/µL      Absolute Monocytes 0.42 Thousand/µL      Eosinophils Absolute 0.03 Thousand/µL      Basophils Absolute 0.01 Thousands/µL     Narrative:      This is an appended report.  These results have been appended to a previously verified report.            CT abdomen pelvis with contrast   Final Interpretation by Yosi Munoz MD ([id number])      1. Unremarkable appearance of the liver   2. Status post cholecystectomy. No biliary ductal dilatation.   3. No acute inflammatory changes within the abdomen or pelvis   4. 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      Clinical follow-up recommended      Workstation performed: [id number]         XR chest 1 view portable   Final Interpretation by Shawn Fajardo MD ( [id number])      No acute cardiopulmonary disease.            Workstation performed: [id number]             Procedures    ED Medication and Procedure Management   Prior to Admission Medications   Prescriptions Last Dose Informant Patient Reported? Taking?   DULoxetine (CYMBALTA) 60 mg delayed release capsule   No No   Sig: Take 1 capsule (60 mg total) by mouth 2 (two) times a day   Mounjaro 10 MG/0.5ML SOAJ   No No   Sig: Inject 10 mg under the skin once a week   Patient not taking: Reported on 12/10/2024   Syringe 1 ML MISC  Self No No   Sig: Use every 30 (thirty) days   acetaminophen (TYLENOL) 500 mg tablet  Self Yes No   Sig: Take 500 mg by mouth every 6 (six) hours as needed for mild pain   ascorbic acid (VITAMIN C) 250 mg tablet  Self No No   Sig: Take 1 tablet (250 mg total) by mouth daily With FeSO4   azelastine (ASTELIN) 0.1 % nasal spray   No No   Si sprays into  each nostril 2 (two) times a day Use in each nostril as directed   clobetasol (TEMOVATE) 0.05 % cream  Self No No   Sig: Apply topically 2 (two) times a day   clotrimazole-betamethasone (LOTRISONE) 1-0.05 % cream   No No   Sig: Apply topically 2 (two) times a day for 7 days   cyanocobalamin 1,000 mcg/mL   No No   Sig: Inject 1 mL (1,000 mcg total) into a muscle once a week for 4 doses   famotidine (PEPCID) 40 MG tablet  Self No No   Sig: Take 1 tablet (40 mg total) by mouth daily at bedtime as needed for indigestion or heartburn   fluocinonide (LIDEX) 0.05 % cream   No No   Sig: Apply topically 2 (two) times a day for 7 days   folic acid (FOLVITE) 400 mcg tablet  Self No No   Sig: Take 1 tablet (400 mcg total) by mouth daily   hydroCHLOROthiazide 25 mg tablet   No No   Sig: Take 1 tablet (25 mg total) by mouth daily   metoprolol succinate (TOPROL-XL) 50 mg 24 hr tablet   No No   Sig: Take 0.5 tablets (25 mg total) by mouth daily at bedtime   montelukast (SINGULAIR) 10 mg tablet  Self No No   Sig: Take 1 tablet (10 mg total) by mouth daily at bedtime   mupirocin (BACTROBAN) 2 % ointment   No No   Sig: Apply topically 3 (three) times a day   omeprazole (PriLOSEC) 40 MG capsule   No No   Sig: Take 1 capsule (40 mg total) by mouth daily   ondansetron (ZOFRAN) 8 mg tablet  Self No No   Sig: Take 1 tablet (8 mg total) by mouth every 8 (eight) hours as needed for nausea or vomiting   potassium chloride (Klor-Con M20) 20 mEq tablet   No No   Sig: Take 2 tablets (40 mEq total) by mouth 2 (two) times a day for 7 days   tirzepatide (Zepbound) 10 mg/0.5 mL auto-injector   No No   Sig: Inject 0.5 mL (10 mg total) under the skin once a week      Facility-Administered Medications: None     Discharge Medication List as of 1/18/2025  2:16 AM        START taking these medications    Details   !! ondansetron (ZOFRAN) 4 mg tablet Take 1 tablet (4 mg total) by mouth every 6 (six) hours, Starting Sat 1/18/2025, Normal       !! -  Potential duplicate medications found. Please discuss with provider.        CONTINUE these medications which have NOT CHANGED    Details   acetaminophen (TYLENOL) 500 mg tablet Take 500 mg by mouth every 6 (six) hours as needed for mild pain, Historical Med      ascorbic acid (VITAMIN C) 250 mg tablet Take 1 tablet (250 mg total) by mouth daily With FeSO4, Starting Sat 6/6/2020, No Print      azelastine (ASTELIN) 0.1 % nasal spray 2 sprays into each nostril 2 (two) times a day Use in each nostril as directed, Starting Wed 8/7/2024, Normal      clobetasol (TEMOVATE) 0.05 % cream Apply topically 2 (two) times a day, Starting Fri 3/1/2024, Normal      clotrimazole-betamethasone (LOTRISONE) 1-0.05 % cream Apply topically 2 (two) times a day for 7 days, Starting Tue 9/3/2024, Until Tue 12/10/2024, Normal      cyanocobalamin 1,000 mcg/mL Inject 1 mL (1,000 mcg total) into a muscle once a week for 4 doses, Starting Wed 10/9/2024, Until Tue 12/10/2024, Normal      DULoxetine (CYMBALTA) 60 mg delayed release capsule Take 1 capsule (60 mg total) by mouth 2 (two) times a day, Starting Wed 9/25/2024, Normal      famotidine (PEPCID) 40 MG tablet Take 1 tablet (40 mg total) by mouth daily at bedtime as needed for indigestion or heartburn, Starting Tue 5/21/2024, Normal      fluocinonide (LIDEX) 0.05 % cream Apply topically 2 (two) times a day for 7 days, Starting Wed 5/8/2024, Until Tue 12/10/2024, Normal      folic acid (FOLVITE) 400 mcg tablet Take 1 tablet (400 mcg total) by mouth daily, Starting Mon 1/17/2022, Normal      hydroCHLOROthiazide 25 mg tablet Take 1 tablet (25 mg total) by mouth daily, Starting Mon 10/14/2024, Normal      metoprolol succinate (TOPROL-XL) 50 mg 24 hr tablet Take 0.5 tablets (25 mg total) by mouth daily at bedtime, Starting Tue 10/8/2024, No Print      montelukast (SINGULAIR) 10 mg tablet Take 1 tablet (10 mg total) by mouth daily at bedtime, Starting Tue 5/21/2024, Normal      Mounjaro 10 MG/0.5ML  SOAJ Inject 10 mg under the skin once a week, Starting Wed 11/27/2024, Normal      mupirocin (BACTROBAN) 2 % ointment Apply topically 3 (three) times a day, Starting Mon 8/26/2024, Normal      omeprazole (PriLOSEC) 40 MG capsule Take 1 capsule (40 mg total) by mouth daily, Starting Tue 11/19/2024, Normal      !! ondansetron (ZOFRAN) 8 mg tablet Take 1 tablet (8 mg total) by mouth every 8 (eight) hours as needed for nausea or vomiting, Starting Tue 5/21/2024, Normal      potassium chloride (Klor-Con M20) 20 mEq tablet Take 2 tablets (40 mEq total) by mouth 2 (two) times a day for 7 days, Starting Wed 10/9/2024, Until Wed 10/16/2024, Normal      Syringe 1 ML MISC Use every 30 (thirty) days, Starting Wed 5/22/2024, Normal      tirzepatide (Zepbound) 10 mg/0.5 mL auto-injector Inject 0.5 mL (10 mg total) under the skin once a week, Starting Wed 12/18/2024, Normal       !! - Potential duplicate medications found. Please discuss with provider.        No discharge procedures on file.  ED SEPSIS DOCUMENTATION   Time reflects when diagnosis was documented in both MDM as applicable and the Disposition within this note       Time User Action Codes Description Comment    1/18/2025  2:14 AM CheckArmando [E87.6] Hypokalemia     1/18/2025  2:14 AM CheckArmando [R74.01] Transaminitis     1/18/2025  2:15 AM CheckArmando [R07.9] Chest pain     1/18/2025  2:15 AM CheckArmando [R11.2] Nausea and vomiting                  Calvin Mercado MD  01/19/25 2158

## 2025-01-18 NOTE — ED CARE HANDOFF
Emergency Department Sign Out Note        Sign out and transfer of care from Dr. Mercado See Separate Emergency Department note.     The patient, Ting Vasques, was evaluated by the previous provider for vomiting.    Workup Completed:  CBC, CMP, troponin, lipase, TSH, viral swab, urinalysis, delta troponin, CT scan of the abdomen and pelvis.    ED Course / Workup Pending (followup):  Patient signed out to me pending CT results.    CT scan was negative for any acute findings.  Demonstrated stable appearing renal lesion.  Patient was informed of this finding.  Her symptoms improved.  She is stable for discharge with outpatient follow-up.                                  ED Course as of 01/20/25 0640   Sat Jan 18, 2025   0043 Waiting ct   0108 Delta 2hr hsTnI: 0   0214 No acute findings per V rad report   0234 1.3 cm exophytic mixed attenuation partially enhancing right renal lesion without adjacent fat stranding unchanged compared to prior CT scan patient informed of this finding and need to follow-up with PCP for possible further evaluation although it is reassuring that it is unchanged from prior scan     Procedures  Medical Decision Making  Amount and/or Complexity of Data Reviewed  Labs: ordered. Decision-making details documented in ED Course.  Radiology: ordered.    Risk  Prescription drug management.            Disposition  Final diagnoses:   Hypokalemia   Transaminitis   Chest pain   Nausea and vomiting     Time reflects when diagnosis was documented in both MDM as applicable and the Disposition within this note       Time User Action Codes Description Comment    1/18/2025  2:14 AM Check, Armando Hoang [E87.6] Hypokalemia     1/18/2025  2:14 AM Check, Armando Add [R74.01] Transaminitis     1/18/2025  2:15 AM Check, Armando Add [R07.9] Chest pain     1/18/2025  2:15 AM Check, Armando Add [R11.2] Nausea and vomiting           ED Disposition       ED Disposition   Discharge    Condition   Stable    Date/Time   Sat Jan  18, 2025  2:15 AM    Comment   Ting Vasques discharge to home/self care.                   Follow-up Information       Follow up With Specialties Details Why Contact Info Additional Information    [facility] Emergency Medicine  If symptoms worsen [address] [facility] [id number] [phone] [facility], [address] [facility], [place], [place] [address]    Rosa Blankenship MD Internal Medicine Schedule an appointment as soon as possible for a visit in 2 days  [address] [place] [id number]  [phone]       [facility] Schedule an appointment as soon as possible for a visit   [address] [place] [id number]  [phone] [facility] [address], [address], [phone]          Patient's Medications   Discharge Prescriptions    ONDANSETRON (ZOFRAN) 4 MG TABLET    Take 1 tablet (4 mg total) by mouth every 6 (six) hours       Start Date: 1/18/2025 End Date: --       Order Dose: 4 mg       Quantity: 12 tablet    Refills: 0     No discharge procedures on file.       ED Provider  Electronically Signed by     Armando Winston MD  01/20/25 0640

## 2025-01-19 ENCOUNTER — TELEPHONE (OUTPATIENT)
Dept: GYNECOLOGIC ONCOLOGY | Facility: CLINIC | Age: 50
End: 2025-01-19

## 2025-01-20 DIAGNOSIS — E80.6 HYPERBILIRUBINEMIA: ICD-10-CM

## 2025-01-20 DIAGNOSIS — E87.6 HYPOKALEMIA: ICD-10-CM

## 2025-01-20 DIAGNOSIS — R74.8 ELEVATED LIVER ENZYMES: Primary | ICD-10-CM

## 2025-01-20 DIAGNOSIS — R11.14 BILIOUS VOMITING WITH NAUSEA: ICD-10-CM

## 2025-01-20 NOTE — PROGRESS NOTES
Discussed ER visit with pt due to hypokalemia, vomiting, diarrhea with presumed GI bug, which mother recently had and she was caring for her. Liver enzymes significantly elevated with K 2.9. Pt reports taking occasional tylenol for back pain, no alcohol use, now taking compounded Zepbound. CT A/P and RUQ US unremarkable. Pt reports still nauseated but vomiting and diarrhea resolved. No jaundice or scleral icterus but urine is orange colored. Will repeat labs this week. Advised ER if jaundice develops, abdominal pain or recurrent vomiting.

## 2025-01-20 NOTE — TELEPHONE ENCOUNTER
Called and spoke to patient about needing to reschedule her appointment with provider. Patient choose a date, time and location that worked best for her.

## 2025-01-23 ENCOUNTER — APPOINTMENT (OUTPATIENT)
Dept: LAB | Facility: MEDICAL CENTER | Age: 50
End: 2025-01-23
Payer: COMMERCIAL

## 2025-01-23 DIAGNOSIS — R74.8 ELEVATED LIVER ENZYMES: ICD-10-CM

## 2025-01-23 DIAGNOSIS — E80.6 HYPERBILIRUBINEMIA: ICD-10-CM

## 2025-01-23 DIAGNOSIS — E55.9 VITAMIN D DEFICIENCY: ICD-10-CM

## 2025-01-23 DIAGNOSIS — E87.6 HYPOKALEMIA: ICD-10-CM

## 2025-01-23 DIAGNOSIS — E53.8 B12 DEFICIENCY: ICD-10-CM

## 2025-01-23 DIAGNOSIS — R11.14 BILIOUS VOMITING WITH NAUSEA: ICD-10-CM

## 2025-01-23 LAB
25(OH)D3 SERPL-MCNC: 38.1 NG/ML (ref 30–100)
ALBUMIN SERPL BCG-MCNC: 4.1 G/DL (ref 3.5–5)
ALP SERPL-CCNC: 75 U/L (ref 34–104)
ALT SERPL W P-5'-P-CCNC: 83 U/L (ref 7–52)
ANION GAP SERPL CALCULATED.3IONS-SCNC: 9 MMOL/L (ref 4–13)
AST SERPL W P-5'-P-CCNC: 31 U/L (ref 13–39)
BASOPHILS # BLD AUTO: 0.03 THOUSANDS/ΜL (ref 0–0.1)
BASOPHILS NFR BLD AUTO: 1 % (ref 0–1)
BILIRUB DIRECT SERPL-MCNC: 0.13 MG/DL (ref 0–0.2)
BILIRUB SERPL-MCNC: 0.6 MG/DL (ref 0.2–1)
BUN SERPL-MCNC: 7 MG/DL (ref 5–25)
CALCIUM SERPL-MCNC: 9.2 MG/DL (ref 8.4–10.2)
CHLORIDE SERPL-SCNC: 95 MMOL/L (ref 96–108)
CO2 SERPL-SCNC: 36 MMOL/L (ref 21–32)
CREAT SERPL-MCNC: 0.6 MG/DL (ref 0.6–1.3)
EOSINOPHIL # BLD AUTO: 0.18 THOUSAND/ΜL (ref 0–0.61)
EOSINOPHIL NFR BLD AUTO: 3 % (ref 0–6)
ERYTHROCYTE [DISTWIDTH] IN BLOOD BY AUTOMATED COUNT: 12.9 % (ref 11.6–15.1)
GFR SERPL CREATININE-BSD FRML MDRD: 107 ML/MIN/1.73SQ M
GLUCOSE P FAST SERPL-MCNC: 93 MG/DL (ref 65–99)
HAV IGM SER QL: NORMAL
HBV CORE IGM SER QL: NORMAL
HBV SURFACE AG SER QL: NORMAL
HCT VFR BLD AUTO: 42 % (ref 34.8–46.1)
HCV AB SER QL: NORMAL
HGB BLD-MCNC: 13.8 G/DL (ref 11.5–15.4)
IMM GRANULOCYTES # BLD AUTO: 0.02 THOUSAND/UL (ref 0–0.2)
IMM GRANULOCYTES NFR BLD AUTO: 0 % (ref 0–2)
LYMPHOCYTES # BLD AUTO: 2.72 THOUSANDS/ΜL (ref 0.6–4.47)
LYMPHOCYTES NFR BLD AUTO: 46 % (ref 14–44)
MAGNESIUM SERPL-MCNC: 1.8 MG/DL (ref 1.9–2.7)
MCH RBC QN AUTO: 27.2 PG (ref 26.8–34.3)
MCHC RBC AUTO-ENTMCNC: 32.9 G/DL (ref 31.4–37.4)
MCV RBC AUTO: 83 FL (ref 82–98)
MONOCYTES # BLD AUTO: 0.51 THOUSAND/ΜL (ref 0.17–1.22)
MONOCYTES NFR BLD AUTO: 9 % (ref 4–12)
NEUTROPHILS # BLD AUTO: 2.43 THOUSANDS/ΜL (ref 1.85–7.62)
NEUTS SEG NFR BLD AUTO: 41 % (ref 43–75)
NRBC BLD AUTO-RTO: 0 /100 WBCS
PLATELET # BLD AUTO: 224 THOUSANDS/UL (ref 149–390)
PMV BLD AUTO: 10.8 FL (ref 8.9–12.7)
POTASSIUM SERPL-SCNC: 3.3 MMOL/L (ref 3.5–5.3)
PROT SERPL-MCNC: 5.5 G/DL (ref 6.4–8.4)
RBC # BLD AUTO: 5.08 MILLION/UL (ref 3.81–5.12)
SODIUM SERPL-SCNC: 140 MMOL/L (ref 135–147)
VIT B12 SERPL-MCNC: 1260 PG/ML (ref 180–914)
WBC # BLD AUTO: 5.89 THOUSAND/UL (ref 4.31–10.16)

## 2025-01-23 PROCEDURE — 36415 COLL VENOUS BLD VENIPUNCTURE: CPT

## 2025-01-23 PROCEDURE — 80076 HEPATIC FUNCTION PANEL: CPT

## 2025-01-23 PROCEDURE — 82607 VITAMIN B-12: CPT

## 2025-01-23 PROCEDURE — 83735 ASSAY OF MAGNESIUM: CPT

## 2025-01-23 PROCEDURE — 82306 VITAMIN D 25 HYDROXY: CPT

## 2025-01-23 PROCEDURE — 80074 ACUTE HEPATITIS PANEL: CPT

## 2025-01-23 PROCEDURE — 85025 COMPLETE CBC W/AUTO DIFF WBC: CPT

## 2025-01-23 PROCEDURE — 80048 BASIC METABOLIC PNL TOTAL CA: CPT

## 2025-01-24 ENCOUNTER — RESULTS FOLLOW-UP (OUTPATIENT)
Dept: FAMILY MEDICINE CLINIC | Facility: CLINIC | Age: 50
End: 2025-01-24

## 2025-01-28 ENCOUNTER — PATIENT MESSAGE (OUTPATIENT)
Dept: FAMILY MEDICINE CLINIC | Facility: CLINIC | Age: 50
End: 2025-01-28

## 2025-01-28 DIAGNOSIS — I10 ESSENTIAL HYPERTENSION: Primary | ICD-10-CM

## 2025-01-29 RX ORDER — LISINOPRIL 5 MG/1
5 TABLET ORAL DAILY
Qty: 90 TABLET | Refills: 1 | Status: SHIPPED | OUTPATIENT
Start: 2025-01-29

## 2025-01-31 ENCOUNTER — HOSPITAL ENCOUNTER (OUTPATIENT)
Dept: NON INVASIVE DIAGNOSTICS | Facility: HOSPITAL | Age: 50
Discharge: HOME | End: 2025-01-31
Attending: INTERNAL MEDICINE

## 2025-02-05 NOTE — PROGRESS NOTES
Name: Ting Vasques      : 1975      MRN: [id number]  Encounter Provider: Virginia Rincno MD  Encounter Date: 2025   Encounter department: [facility]  :  Assessment & Plan  Encounter for follow-up surveillance of endometrial cancer  48yo withHTN, obesity with h/o elin-en-y and h/o stage IA endometrial adenocarcinoma and stage IA endometrioid ovarian borderline tumor, figo grade 1 presents for follow up.    Biopsy at last visit c/w granulation tissue   Clinically S[facility]  Exam benign    1 year from diagnosis    Continue with [id number] surveillance visits        Weight loss  >80lb weight loss on zepbound.  Interested in plastics evaluation for skin removal.                  History of Present Illness   Reason for Visit / CC: follow up    Ting Vasques is a 49 y.o. female   48yo withHTN, obesity with h/o elin-en-y and h/o stage IA endometrial adenocarcinoma and stage IA endometrioid ovarian borderline tumor, figo grade 1 presents for follow up. Pt denies abdominal bloating/pain/cramping. No vaginal bleeding/discharge. No changes in BM/urination. No early satiety/Appetite adequate. She ahs lost about 80lb on zepbound and reports feeling much better         Pertinent Medical History      Oncology History   Cancer Staging   Endometrial cancer ([facility])  Staging form: Corpus Uteri - Carcinoma, AJCC 8th Edition  - Pathologic stage from 2024: FIGO Stage IA, calculated as Stage Unknown (pT1a, pNX, cM0) - Signed by Virginia Rincon MD on 2024  Stage prefix: Initial diagnosis  Histologic grade (G): G1  Histologic grading system: 3 grade system  Oncology History   Endometrial cancer ([facility])   10/24/2023 Initial Diagnosis    Endometrial cancer ([facility])     2024 -  Cancer Staged    Staging form: Corpus Uteri - Carcinoma, AJCC 8th Edition  - Pathologic stage from 2024: FIGO Stage IA, calculated as Stage Unknown (pT1a, pNX, cM0) - Signed by Virginia Rincon MD on 2024  Stage prefix:  "Initial diagnosis  Histologic grade (G): G1  Histologic grading system: 3 grade system       5/23/2024 Surgery    RA-TLH/BSO    MMRp     6/14/2024 Genetic Testing    FH VUS        Review of Systems   Constitutional: Negative.    HENT: Negative.     Eyes: Negative.    Respiratory: Negative.     Cardiovascular: Negative.    Gastrointestinal: Negative.    Endocrine: Negative.    Genitourinary: Negative.    Musculoskeletal: Negative.    Neurological: Negative.    Psychiatric/Behavioral: Negative.      A complete review of systems is negative other than that noted above in the HPI.       Objective   /82 (BP Location: Left arm, Patient Position: Sitting, Cuff Size: Standard)   Pulse 80   Temp 97.9 °F (36.6 °C) (Temporal)   Wt 77.6 kg (171 lb)   LMP 03/14/2024 Comment: per pt has MIRENA in place  SpO2 100%   BMI 26.78 kg/m²     Body mass index is 26.78 kg/m².  Pain Screening:  Pain Score: 0-No pain  ECOG     Physical Exam  HENT:      Head: Normocephalic and atraumatic.      Nose: Nose normal.   Cardiovascular:      Rate and Rhythm: Normal rate and regular rhythm.   Pulmonary:      Effort: Pulmonary effort is normal.   Abdominal:      General: There is no distension.      Palpations: Abdomen is soft. There is no mass.   Genitourinary:     Comments: defer  Musculoskeletal:         General: No swelling. Normal range of motion.      Cervical back: Normal range of motion.   Skin:     General: Skin is warm and dry.   Neurological:      General: No focal deficit present.      Mental Status: She is alert.   Psychiatric:         Mood and Affect: Mood normal.          Labs: I have reviewed pertinent labs.   No results found for: \"\"  Lab Results   Component Value Date/Time    Potassium 3.3 (L) 01/23/2025 09:56 AM    Chloride 95 (L) 01/23/2025 09:56 AM    CO2 36 (H) 01/23/2025 09:56 AM    BUN 7 01/23/2025 09:56 AM    Creatinine 0.60 01/23/2025 09:56 AM    Glucose, Fasting 93 01/23/2025 09:56 AM    Calcium 9.2 " "01/23/2025 09:56 AM    AST 31 01/23/2025 09:56 AM    ALT 83 (H) 01/23/2025 09:56 AM    Alkaline Phosphatase 75 01/23/2025 09:56 AM    eGFR 107 01/23/2025 09:56 AM     Lab Results   Component Value Date/Time    WBC 5.89 01/23/2025 09:56 AM    Hemoglobin 13.8 01/23/2025 09:56 AM    Hematocrit 42.0 01/23/2025 09:56 AM    MCV 83 01/23/2025 09:56 AM    Platelets 224 01/23/2025 09:56 AM     Lab Results   Component Value Date/Time    Absolute Neutrophils 2.43 01/23/2025 09:56 AM        Trend:  No results found for: \"\"            " 24

## 2025-02-05 NOTE — ASSESSMENT & PLAN NOTE
50yo withHTN, obesity with h/o elin-en-y and h/o stage IA endometrial adenocarcinoma and stage IA endometrioid ovarian borderline tumor, figo grade 1 presents for follow up.    Biopsy at last visit c/w granulation tissue   Clinically [facility]  Exam benign    1 year from diagnosis    Continue with [id number] surveillance visits

## 2025-02-07 ENCOUNTER — OFFICE VISIT (OUTPATIENT)
Dept: GYNECOLOGIC ONCOLOGY | Facility: CLINIC | Age: 50
End: 2025-02-07
Payer: COMMERCIAL

## 2025-02-07 VITALS
BODY MASS INDEX: 26.78 KG/M2 | OXYGEN SATURATION: 100 % | DIASTOLIC BLOOD PRESSURE: 82 MMHG | TEMPERATURE: 97.9 F | WEIGHT: 171 LBS | SYSTOLIC BLOOD PRESSURE: 122 MMHG | HEART RATE: 80 BPM

## 2025-02-07 DIAGNOSIS — Z08 ENCOUNTER FOR FOLLOW-UP SURVEILLANCE OF ENDOMETRIAL CANCER: Primary | ICD-10-CM

## 2025-02-07 DIAGNOSIS — R63.4 WEIGHT LOSS: ICD-10-CM

## 2025-02-07 DIAGNOSIS — Z85.42 ENCOUNTER FOR FOLLOW-UP SURVEILLANCE OF ENDOMETRIAL CANCER: Primary | ICD-10-CM

## 2025-02-07 PROCEDURE — 99213 OFFICE O/P EST LOW 20 MIN: CPT | Performed by: OBSTETRICS & GYNECOLOGY

## 2025-02-07 PROCEDURE — 99459 PELVIC EXAMINATION: CPT | Performed by: OBSTETRICS & GYNECOLOGY

## 2025-02-10 ENCOUNTER — PATIENT MESSAGE (OUTPATIENT)
Dept: FAMILY MEDICINE CLINIC | Facility: CLINIC | Age: 50
End: 2025-02-10

## 2025-02-10 DIAGNOSIS — F41.9 ANXIETY AND DEPRESSION: Primary | ICD-10-CM

## 2025-02-10 DIAGNOSIS — F32.A ANXIETY AND DEPRESSION: Primary | ICD-10-CM

## 2025-02-11 RX ORDER — BUSPIRONE HYDROCHLORIDE 10 MG/1
10 TABLET ORAL 2 TIMES DAILY
Qty: 60 TABLET | Refills: 3 | Status: SHIPPED | OUTPATIENT
Start: 2025-02-11

## 2025-02-16 ENCOUNTER — PATIENT MESSAGE (OUTPATIENT)
Dept: FAMILY MEDICINE CLINIC | Facility: CLINIC | Age: 50
End: 2025-02-16

## 2025-02-16 DIAGNOSIS — I10 ESSENTIAL HYPERTENSION: ICD-10-CM

## 2025-02-16 DIAGNOSIS — R10.12 LEFT UPPER QUADRANT ABDOMINAL PAIN: ICD-10-CM

## 2025-02-16 DIAGNOSIS — K21.9 GASTROESOPHAGEAL REFLUX DISEASE, UNSPECIFIED WHETHER ESOPHAGITIS PRESENT: ICD-10-CM

## 2025-02-16 DIAGNOSIS — E87.6 HYPOKALEMIA: Primary | ICD-10-CM

## 2025-02-17 DIAGNOSIS — I10 ESSENTIAL HYPERTENSION: ICD-10-CM

## 2025-02-17 RX ORDER — METOPROLOL SUCCINATE 50 MG/1
25 TABLET, EXTENDED RELEASE ORAL
Qty: 15 TABLET | Refills: 0 | Status: SHIPPED | OUTPATIENT
Start: 2025-02-17

## 2025-02-17 NOTE — TELEPHONE ENCOUNTER
Reason for call:   [x] Refill   [] Prior Auth  [] Other:     Office:   [x] PCP/Provider - [facility]  Authorized By: Rosa Blankenship MD    [] Specialty/Provider -     Medication: metoprolol succinate (TOPROL-XL) 50 mg 24 hr tablet    Dose/Frequency:  Take 0.5 tablets (25 mg total) by mouth daily at bedtime,    Quantity: 90 tablets     [id number] [place] [facility]NA RAMSEY    Does the patient have enough for 3 days?   [x] Yes   [] No - Send as HP to POD

## 2025-02-18 ENCOUNTER — OFFICE VISIT (OUTPATIENT)
Dept: FAMILY MEDICINE CLINIC | Facility: CLINIC | Age: 50
End: 2025-02-18
Payer: COMMERCIAL

## 2025-02-18 VITALS
OXYGEN SATURATION: 97 % | TEMPERATURE: 98.2 F | HEART RATE: 88 BPM | DIASTOLIC BLOOD PRESSURE: 70 MMHG | WEIGHT: 167 LBS | BODY MASS INDEX: 26.16 KG/M2 | RESPIRATION RATE: 16 BRPM | SYSTOLIC BLOOD PRESSURE: 112 MMHG

## 2025-02-18 DIAGNOSIS — K21.9 GASTROESOPHAGEAL REFLUX DISEASE, UNSPECIFIED WHETHER ESOPHAGITIS PRESENT: ICD-10-CM

## 2025-02-18 DIAGNOSIS — B96.89 BACTERIAL URI: Primary | ICD-10-CM

## 2025-02-18 DIAGNOSIS — J06.9 BACTERIAL URI: Primary | ICD-10-CM

## 2025-02-18 DIAGNOSIS — R05.9 COUGH, UNSPECIFIED TYPE: ICD-10-CM

## 2025-02-18 DIAGNOSIS — R06.7 SNEEZING: ICD-10-CM

## 2025-02-18 DIAGNOSIS — E53.8 B12 DEFICIENCY: ICD-10-CM

## 2025-02-18 DIAGNOSIS — R09.89 CHEST CONGESTION: ICD-10-CM

## 2025-02-18 DIAGNOSIS — R10.12 LEFT UPPER QUADRANT ABDOMINAL PAIN: ICD-10-CM

## 2025-02-18 LAB
SL AMB POCT RAPID FLU A: NEGATIVE
SL AMB POCT RAPID FLU B: NEGATIVE

## 2025-02-18 PROCEDURE — 87804 INFLUENZA ASSAY W/OPTIC: CPT | Performed by: NURSE PRACTITIONER

## 2025-02-18 PROCEDURE — 99213 OFFICE O/P EST LOW 20 MIN: CPT | Performed by: NURSE PRACTITIONER

## 2025-02-18 RX ORDER — PREDNISONE 20 MG/1
20 TABLET ORAL 2 TIMES DAILY WITH MEALS
Qty: 10 TABLET | Refills: 0 | Status: SHIPPED | OUTPATIENT
Start: 2025-02-18 | End: 2025-02-23

## 2025-02-18 RX ORDER — AZITHROMYCIN 250 MG/1
TABLET, FILM COATED ORAL
Qty: 6 TABLET | Refills: 0 | Status: SHIPPED | OUTPATIENT
Start: 2025-02-18 | End: 2025-02-23

## 2025-02-18 RX ORDER — CYANOCOBALAMIN 1000 UG/ML
1000 INJECTION, SOLUTION INTRAMUSCULAR; SUBCUTANEOUS WEEKLY
Qty: 10 ML | Refills: 1 | Status: SHIPPED | OUTPATIENT
Start: 2025-02-18 | End: 2025-03-12

## 2025-02-18 RX ORDER — FAMOTIDINE 40 MG/1
40 TABLET, FILM COATED ORAL
Qty: 90 TABLET | Refills: 1 | Status: CANCELLED | OUTPATIENT
Start: 2025-02-18

## 2025-02-18 RX ORDER — FAMOTIDINE 40 MG/1
40 TABLET, FILM COATED ORAL
Qty: 90 TABLET | Refills: 1 | Status: SHIPPED | OUTPATIENT
Start: 2025-02-18

## 2025-02-18 NOTE — LETTER
February 18, 2025     Patient: Ting Vasques  YOB: 1975  Date of Visit: 2/18/2025      To Whom it May Concern:    Ting Vasques is under my professional care. Ting was seen in my office on 2/18/2025. Ting may return to work on 2/21/25. Please excuse 2/18/25, 2/19/25, and 2/20/25 .    If you have any questions or concerns, please don't hesitate to call.         Sincerely,          SHARMIN Tellez        CC: No Recipients

## 2025-02-18 NOTE — PROGRESS NOTES
Name: Ting Vasques      : 1975      MRN: [id number]  Encounter Provider: SHARMIN Tellez  Encounter Date: 2025   Encounter department: [facility]  :  Assessment & Plan  Chest congestion    Orders:    POCT rapid flu A and B    Sneezing    Orders:    POCT rapid flu A and B    Cough, unspecified type    Orders:    POCT rapid flu A and B    Bacterial URI    Orders:    azithromycin (Zithromax) 250 mg tablet; Take 2 tablets (500 mg total) by mouth daily for 1 day, THEN 1 tablet (250 mg total) daily for 4 days.    predniSONE 20 mg tablet; Take 1 tablet (20 mg total) by mouth 2 (two) times a day with meals for 5 days          Depression Screening and Follow-up Plan: Patient was screened for depression during today's encounter. They screened negative with a PHQ-2 score of 1.        History of Present Illness   Here for 5 days of cold symptoms- does well with Zpak and steroids      Review of Systems   Constitutional:  Positive for activity change, chills and fatigue. Negative for fever.   HENT:  Positive for congestion, ear pain, postnasal drip, rhinorrhea, sinus pressure and sore throat.    Eyes:  Negative for pain, discharge and redness.   Respiratory:  Positive for cough. Negative for wheezing.    Cardiovascular:  Negative for chest pain.   Gastrointestinal:  Positive for constipation (chronic) and nausea (chronic). Negative for diarrhea and vomiting.   Musculoskeletal:  Positive for myalgias.   Skin:  Negative for rash.   Neurological:  Positive for dizziness and headaches.       Objective   /70   Pulse 88   Temp 98.2 °F (36.8 °C)   Resp 16   Wt 75.8 kg (167 lb)   LMP 2024 Comment: per pt has MIRENA in place  SpO2 97%   BMI 26.16 kg/m²      Physical Exam  Vitals and nursing note reviewed.   Constitutional:       General: She is not in acute distress.     Appearance: She is well-developed. She is not diaphoretic.   HENT:      Head: Normocephalic and  atraumatic.      Right Ear: Ear canal and external ear normal. A middle ear effusion is present.      Left Ear: Ear canal and external ear normal. A middle ear effusion is present.      Nose: Congestion and rhinorrhea present.      Mouth/Throat:      Mouth: Mucous membranes are moist.      Pharynx: Uvula midline. Oropharyngeal exudate (clear) present.   Eyes:      General:         Right eye: No discharge.         Left eye: No discharge.      Conjunctiva/sclera: Conjunctivae normal.   Neck:      Thyroid: No thyromegaly.   Cardiovascular:      Rate and Rhythm: Normal rate and regular rhythm.      Heart sounds: Normal heart sounds.      No friction rub. No gallop.   Pulmonary:      Effort: Pulmonary effort is normal. No respiratory distress.      Breath sounds: Normal breath sounds. No wheezing or rales.      Comments: Dry cough noted in room  Musculoskeletal:         General: No tenderness or deformity. Normal range of motion.      Cervical back: Normal range of motion and neck supple.   Lymphadenopathy:      Cervical: No cervical adenopathy.   Skin:     General: Skin is warm and dry.      Findings: No erythema or rash.   Neurological:      Mental Status: She is alert and oriented to person, place, and time.      Cranial Nerves: No cranial nerve deficit.      Coordination: Coordination normal.   Psychiatric:         Mood and Affect: Mood normal.         Behavior: Behavior normal.         Thought Content: Thought content normal.         Judgment: Judgment normal.

## 2025-02-18 NOTE — TELEPHONE ENCOUNTER
Patient requesting refill(s) of: Vit B12 1000mcg/vial    Last filled: 10/9/24  Last appt: 2/18/25  Next appt: 4/1/25  [facility] [place]

## 2025-02-18 NOTE — TELEPHONE ENCOUNTER
Pt sent Youte[employer] message requesting the following:    Medication: Famotidine 40mg tablet    Dose/Frequency: Take 1 tablet by mouth daily at bedtime as needed for indigestion or heartburn    Quantity: 90 Refills 1    [facility]    Office:   [x] PCP/Provider -   [] Speciality/Provider -     Does the patient have enough for 3 days?   [x] Yes   [] No - Send as HP to POD

## 2025-02-19 ENCOUNTER — TELEPHONE (OUTPATIENT)
Age: 50
End: 2025-02-19

## 2025-02-19 NOTE — TELEPHONE ENCOUNTER
Pt called in wishing to speak with the person in charge of the [place] paperwork.    Reached out to Clerical for further assistance, but no one was available at the moment.     Please advise & call pt back with update. Thank you!    Ting: [phone]

## 2025-02-20 NOTE — PATIENT COMMUNICATION
I called and spoke with patient in regards to FMLA PPW, as a message was routed to me about this. Patient is interesting in proceeding with a leave of absence. Her employer will deny as she has not been with her current employer for a year, but is going to go through the ADA. Asking if PCP would be willing to complete if she does file.     Please advise.

## 2025-03-06 ENCOUNTER — APPOINTMENT (OUTPATIENT)
Dept: LAB | Facility: CLINIC | Age: 50
End: 2025-03-06

## 2025-03-06 ENCOUNTER — OCCMED (OUTPATIENT)
Dept: URGENT CARE | Facility: CLINIC | Age: 50
End: 2025-03-06

## 2025-03-06 DIAGNOSIS — Z02.1 PRE-EMPLOYMENT HEALTH SCREENING EXAMINATION: ICD-10-CM

## 2025-03-06 DIAGNOSIS — Z02.1 PRE-EMPLOYMENT HEALTH SCREENING EXAMINATION: Primary | ICD-10-CM

## 2025-03-06 DIAGNOSIS — I10 ESSENTIAL HYPERTENSION: ICD-10-CM

## 2025-03-06 DIAGNOSIS — E87.6 HYPOKALEMIA: ICD-10-CM

## 2025-03-06 PROCEDURE — 36415 COLL VENOUS BLD VENIPUNCTURE: CPT

## 2025-03-06 PROCEDURE — 86480 TB TEST CELL IMMUN MEASURE: CPT

## 2025-03-06 PROCEDURE — 83735 ASSAY OF MAGNESIUM: CPT

## 2025-03-06 PROCEDURE — 80048 BASIC METABOLIC PNL TOTAL CA: CPT

## 2025-03-07 LAB
ANION GAP SERPL CALCULATED.3IONS-SCNC: 8 MMOL/L (ref 4–13)
BUN SERPL-MCNC: 13 MG/DL (ref 5–25)
CALCIUM SERPL-MCNC: 9 MG/DL (ref 8.4–10.2)
CHLORIDE SERPL-SCNC: 102 MMOL/L (ref 96–108)
CO2 SERPL-SCNC: 28 MMOL/L (ref 21–32)
CREAT SERPL-MCNC: 0.86 MG/DL (ref 0.6–1.3)
GAMMA INTERFERON BACKGROUND BLD IA-ACNC: 0.02 IU/ML
GFR SERPL CREATININE-BSD FRML MDRD: 79 ML/MIN/1.73SQ M
GLUCOSE SERPL-MCNC: 97 MG/DL (ref 65–140)
M TB IFN-G BLD-IMP: NEGATIVE
M TB IFN-G CD4+ BCKGRND COR BLD-ACNC: 0.02 IU/ML
M TB IFN-G CD4+ BCKGRND COR BLD-ACNC: 0.04 IU/ML
MAGNESIUM SERPL-MCNC: 2.2 MG/DL (ref 1.9–2.7)
MITOGEN IGNF BCKGRD COR BLD-ACNC: 9.98 IU/ML
POTASSIUM SERPL-SCNC: 4 MMOL/L (ref 3.5–5.3)
SODIUM SERPL-SCNC: 138 MMOL/L (ref 135–147)

## 2025-03-10 ENCOUNTER — RESULTS FOLLOW-UP (OUTPATIENT)
Dept: FAMILY MEDICINE CLINIC | Facility: CLINIC | Age: 50
End: 2025-03-10

## 2025-03-12 ENCOUNTER — OFFICE VISIT (OUTPATIENT)
Dept: FAMILY MEDICINE CLINIC | Facility: CLINIC | Age: 50
End: 2025-03-12
Payer: COMMERCIAL

## 2025-03-12 VITALS
HEART RATE: 74 BPM | OXYGEN SATURATION: 98 % | DIASTOLIC BLOOD PRESSURE: 68 MMHG | RESPIRATION RATE: 18 BRPM | TEMPERATURE: 97.8 F | WEIGHT: 168.3 LBS | SYSTOLIC BLOOD PRESSURE: 126 MMHG | BODY MASS INDEX: 26.42 KG/M2 | HEIGHT: 67 IN

## 2025-03-12 DIAGNOSIS — R21 SKIN RASH: Primary | ICD-10-CM

## 2025-03-12 DIAGNOSIS — I10 ESSENTIAL HYPERTENSION: ICD-10-CM

## 2025-03-12 DIAGNOSIS — M19.90 ARTHRITIS: ICD-10-CM

## 2025-03-12 DIAGNOSIS — C54.1 ENDOMETRIAL CANCER (HCC): ICD-10-CM

## 2025-03-12 PROCEDURE — 99213 OFFICE O/P EST LOW 20 MIN: CPT | Performed by: NURSE PRACTITIONER

## 2025-03-12 RX ORDER — PREDNISONE 10 MG/1
TABLET ORAL
Qty: 30 TABLET | Refills: 0 | Status: SHIPPED | OUTPATIENT
Start: 2025-03-12 | End: 2025-03-24

## 2025-03-12 RX ORDER — TRIAMCINOLONE ACETONIDE 1 MG/G
OINTMENT TOPICAL 2 TIMES DAILY
Qty: 60 G | Refills: 1 | Status: SHIPPED | OUTPATIENT
Start: 2025-03-12

## 2025-03-12 NOTE — TELEPHONE ENCOUNTER
Patient requesting refill(s) of: Metorprolol 50 mg     Last filled: 2/17/25  Last appt: 2/18/25  Next appt:3/12/25  Pharmacy: [facility]

## 2025-03-12 NOTE — PROGRESS NOTES
Name: Ting Vasques      : 1975      MRN: [id number]  Encounter Provider: SHARMIN Lebron  Encounter Date: 3/12/2025   Encounter department: [facility]  :  Assessment & Plan  Skin rash    Orders:    triamcinolone (KENALOG) 0.1 % ointment; Apply topically 2 (two) times a day    predniSONE 10 mg tablet; Take 4 tablets (40 mg total) by mouth daily for 3 days, THEN 3 tablets (30 mg total) daily for 3 days, THEN 2 tablets (20 mg total) daily for 3 days, THEN 1 tablet (10 mg total) daily for 3 days.  Scheduling with Dr. Peterson, dermatology appointment made and patient has been placed on the cancellation list   Endometrial cancer (HCC)  Following with Oncology        Arthritis  Prednisone may aid in the releif until the area is healed to again use the lidoderm patch               History of Present Illness   Patient presents for an acute visit. C/O rash to the right ankle and foot, has been present for the past few days   This is recurrent and we have reviewed previous labs and recommendations. She also states since the rash on the right ankle resurfaced she cannot use her lidoderm patch to aid with arthritic pain.     Rash  Pertinent negatives include no cough, fever, shortness of breath, sore throat or vomiting.     Review of Systems   Constitutional:  Negative for chills and fever.   HENT:  Negative for ear pain and sore throat.    Eyes:  Negative for pain and visual disturbance.   Respiratory:  Negative for cough and shortness of breath.    Cardiovascular:  Negative for chest pain and palpitations.   Gastrointestinal:  Negative for abdominal pain and vomiting.   Genitourinary:  Negative for dysuria and hematuria.   Musculoskeletal:  Positive for arthralgias. Negative for back pain.   Skin:  Positive for rash. Negative for color change.   Neurological:  Negative for seizures and syncope.   All other systems reviewed and are negative.      Objective   /68   Pulse 74    "Temp 97.8 °F (36.6 °C) (Temporal)   Resp 18   Ht 5' 7\" (1.702 m)   Wt 76.3 kg (168 lb 4.8 oz)   LMP 03/14/2024 Comment: per pt has MIRENA in place  SpO2 98%   BMI 26.36 kg/m²      Physical Exam  Vitals and nursing note reviewed.   Constitutional:       General: She is not in acute distress.     Appearance: She is well-developed.   HENT:      Head: Normocephalic and atraumatic.   Eyes:      Conjunctiva/sclera: Conjunctivae normal.   Cardiovascular:      Rate and Rhythm: Normal rate and regular rhythm.      Heart sounds: No murmur heard.  Pulmonary:      Effort: Pulmonary effort is normal. No respiratory distress.      Breath sounds: Normal breath sounds.   Abdominal:      Palpations: Abdomen is soft.      Tenderness: There is no abdominal tenderness.   Musculoskeletal:         General: No swelling.      Cervical back: Neck supple.   Skin:     General: Skin is warm and dry.      Capillary Refill: Capillary refill takes less than 2 seconds.      Findings: Rash present.   Neurological:      Mental Status: She is alert and oriented to person, place, and time.   Psychiatric:         Mood and Affect: Mood normal.         "

## 2025-03-13 RX ORDER — METOPROLOL SUCCINATE 50 MG/1
25 TABLET, EXTENDED RELEASE ORAL
Qty: 30 TABLET | Refills: 3 | Status: SHIPPED | OUTPATIENT
Start: 2025-03-13

## 2025-03-17 DIAGNOSIS — Z98.84 HISTORY OF ROUX-EN-Y GASTRIC BYPASS: ICD-10-CM

## 2025-03-17 DIAGNOSIS — G47.33 OSA (OBSTRUCTIVE SLEEP APNEA): ICD-10-CM

## 2025-03-17 DIAGNOSIS — E66.01 CLASS 3 SEVERE OBESITY DUE TO EXCESS CALORIES WITH SERIOUS COMORBIDITY AND BODY MASS INDEX (BMI) OF 40.0 TO 44.9 IN ADULT (HCC): ICD-10-CM

## 2025-03-17 DIAGNOSIS — E66.813 CLASS 3 SEVERE OBESITY DUE TO EXCESS CALORIES WITH SERIOUS COMORBIDITY AND BODY MASS INDEX (BMI) OF 40.0 TO 44.9 IN ADULT (HCC): ICD-10-CM

## 2025-03-17 DIAGNOSIS — I10 ESSENTIAL HYPERTENSION: ICD-10-CM

## 2025-03-17 DIAGNOSIS — R73.03 PREDIABETES: ICD-10-CM

## 2025-03-17 NOTE — TELEPHONE ENCOUNTER
Patient requesting refill(s) of: Zepbound 10 mg/0.2ml    Last filled: 12/18/24  Last appt: 3/12/25  Next appt:4/1/25  [facility]

## 2025-03-18 DIAGNOSIS — I10 ESSENTIAL HYPERTENSION: ICD-10-CM

## 2025-03-18 RX ORDER — TIRZEPATIDE 10 MG/.5ML
10 INJECTION, SOLUTION SUBCUTANEOUS WEEKLY
Qty: 2 ML | Refills: 3 | Status: SHIPPED | OUTPATIENT
Start: 2025-03-18

## 2025-03-21 DIAGNOSIS — M48.061 SPINAL STENOSIS OF LUMBAR REGION WITHOUT NEUROGENIC CLAUDICATION: ICD-10-CM

## 2025-03-21 NOTE — TELEPHONE ENCOUNTER
Patient requesting refill(s) of: Duloxetine 60 mg     Last filled: 9/25/24  Last appt: 3/12/25  Next appt:4/1/25  Ph[facility]: [facility]

## 2025-03-24 ENCOUNTER — TELEPHONE (OUTPATIENT)
Age: 50
End: 2025-03-24

## 2025-03-24 RX ORDER — DULOXETIN HYDROCHLORIDE 60 MG/1
60 CAPSULE, DELAYED RELEASE ORAL 2 TIMES DAILY
Qty: 180 CAPSULE | Refills: 1 | Status: SHIPPED | OUTPATIENT
Start: 2025-03-24

## 2025-03-24 NOTE — TELEPHONE ENCOUNTER
Patient was last seen regarding weight on 12/10/24. Please schedule a follow up with patient to discuss diet and exercise and updated weight. WE will need patients pharmacy benefits on file as well.

## 2025-03-24 NOTE — TELEPHONE ENCOUNTER
Patient request prior authorization for Zepbound be submitted as Ronnie advised patient that medication would most likely be covered if the provider would include letter showing patient's improvement.

## 2025-03-28 ENCOUNTER — RA CDI HCC (OUTPATIENT)
Dept: OTHER | Facility: HOSPITAL | Age: 50
End: 2025-03-28

## 2025-04-02 PROBLEM — R63.4 WEIGHT LOSS: Status: RESOLVED | Noted: 2025-02-07 | Resolved: 2025-04-02

## 2025-04-03 ENCOUNTER — OFFICE VISIT (OUTPATIENT)
Dept: FAMILY MEDICINE CLINIC | Facility: CLINIC | Age: 50
End: 2025-04-03
Payer: COMMERCIAL

## 2025-04-03 VITALS
OXYGEN SATURATION: 98 % | TEMPERATURE: 97.1 F | WEIGHT: 166 LBS | HEART RATE: 87 BPM | RESPIRATION RATE: 18 BRPM | HEIGHT: 67 IN | BODY MASS INDEX: 26.06 KG/M2 | DIASTOLIC BLOOD PRESSURE: 84 MMHG | SYSTOLIC BLOOD PRESSURE: 122 MMHG

## 2025-04-03 DIAGNOSIS — M25.50 POLYARTHRALGIA: ICD-10-CM

## 2025-04-03 DIAGNOSIS — E53.8 B12 DEFICIENCY: ICD-10-CM

## 2025-04-03 DIAGNOSIS — R21 RASH: ICD-10-CM

## 2025-04-03 DIAGNOSIS — L25.9 CONTACT DERMATITIS, UNSPECIFIED CONTACT DERMATITIS TYPE, UNSPECIFIED TRIGGER: Primary | ICD-10-CM

## 2025-04-03 DIAGNOSIS — E87.6 HYPOKALEMIA: ICD-10-CM

## 2025-04-03 DIAGNOSIS — R73.03 PREDIABETES: ICD-10-CM

## 2025-04-03 DIAGNOSIS — I10 ESSENTIAL HYPERTENSION: ICD-10-CM

## 2025-04-03 DIAGNOSIS — E55.9 VITAMIN D DEFICIENCY: ICD-10-CM

## 2025-04-03 PROCEDURE — 99214 OFFICE O/P EST MOD 30 MIN: CPT | Performed by: INTERNAL MEDICINE

## 2025-04-03 NOTE — PROGRESS NOTES
Name: Ting Vasques      : 1975      MRN: [id number]  Encounter Provider: Rosa Blankenship MD  Encounter Date: 4/3/2025   Encounter department: [facility]  :  Assessment & Plan  Contact dermatitis, unspecified contact dermatitis type, unspecified trigger  -suspect secondary to lidocaine patches. No petechia/purpura to suggest vasculitis. Biopsy scheduled for , will send path report        Essential hypertension  -controlled with HCTZ and metoprolol, check potassium and magnesium levels  Orders:  •  Comprehensive metabolic panel; Future    Prediabetes  Well-controlled, last A1c down to 5.4.        Vitamin D deficiency  25OH-D normal at 38.1       B12 deficiency  -recent level high. Receiving weekly B12        Hypokalemia  -due to thiazide   Orders:  •  Comprehensive metabolic panel; Future  •  Magnesium; Future    Polyarthralgia    Orders:  •  LAUREN Screen w/Reflex Cascade; Future  •  RHEUMATOID FACTOR; Future  •  Cyclic citrul peptide antibody, IgG; Future  •  Sedimentation rate, automated; Future  •  C-reactive protein; Future    Rash    Orders:  •  RHEUMATOID FACTOR; Future  •  Cyclic citrul peptide antibody, IgG; Future  •  Sedimentation rate, automated; Future  •  C-reactive protein; Future           History of Present Illness   50yo female with history of HTN, pre-diabetes, RNY bypass here for follow up.     Ongoing rash on right ankle, where she placed lidoderm patches for ankle arthritis. Petechiae/purpura resolved. Has derm appointment Monday for biopsy. Very itchy and minimal relief with triamcinolone.    Following with Ortho for athritis of right ankle. Also reports some pain and stiffness in hands.     HTN: taking metoprolol and HCTZ, not lisinopril. Supplementing with potassium and magnesium.     On Zepbound compounded through [facility] but having issues with supply.       Review of Systems   Constitutional:  Positive for fatigue. Negative for chills and fever.  "  Respiratory:  Negative for chest tightness and shortness of breath.    Cardiovascular:  Negative for leg swelling.   Musculoskeletal:  Positive for arthralgias and joint swelling.   Skin:  Positive for color change and rash.   Psychiatric/Behavioral:  Positive for dysphoric mood and sleep disturbance. The patient is nervous/anxious.        Objective   /84   Pulse 87   Temp (!) 97.1 °F (36.2 °C)   Resp 18   Ht 5' 7\" (1.702 m)   Wt 75.3 kg (166 lb)   LMP 03/14/2024 Comment: per pt has MIRENA in place  SpO2 98%   BMI 26.00 kg/m²      Physical Exam  Vitals reviewed.   Constitutional:       General: She is not in acute distress.     Appearance: She is normal weight.   Cardiovascular:      Rate and Rhythm: Normal rate and regular rhythm.      Heart sounds: No murmur heard.     No friction rub. No gallop.   Pulmonary:      Effort: Pulmonary effort is normal. No respiratory distress.      Breath sounds: No wheezing, rhonchi or rales.   Skin:     Findings: Rash present. No petechiae. Rash is papular. Rash is not purpuric or urticarial.      Comments: Erythematous patches on lateral right ankle, mehran with pressure   Neurological:      General: No focal deficit present.      Mental Status: She is alert.      Motor: No weakness.      Gait: Gait normal.   Psychiatric:         Mood and Affect: Mood and affect normal.         Behavior: Behavior normal. Behavior is cooperative.         "

## 2025-04-03 NOTE — ASSESSMENT & PLAN NOTE
-controlled with HCTZ and metoprolol, check potassium and magnesium levels  Orders:  •  Comprehensive metabolic panel; Future

## 2025-04-29 ENCOUNTER — PATIENT MESSAGE (OUTPATIENT)
Dept: FAMILY MEDICINE CLINIC | Facility: CLINIC | Age: 50
End: 2025-04-29

## 2025-04-29 DIAGNOSIS — I10 ESSENTIAL HYPERTENSION: ICD-10-CM

## 2025-04-30 RX ORDER — METOPROLOL SUCCINATE 50 MG/1
50 TABLET, EXTENDED RELEASE ORAL
Qty: 90 TABLET | Refills: 3 | Status: SHIPPED | OUTPATIENT
Start: 2025-04-30

## 2025-05-05 ENCOUNTER — APPOINTMENT (OUTPATIENT)
Dept: LAB | Facility: CLINIC | Age: 50
End: 2025-05-05
Payer: COMMERCIAL

## 2025-05-05 ENCOUNTER — APPOINTMENT (OUTPATIENT)
Dept: RADIOLOGY | Facility: CLINIC | Age: 50
End: 2025-05-05
Attending: PODIATRIST
Payer: COMMERCIAL

## 2025-05-05 DIAGNOSIS — Z01.810 PRE-OPERATIVE CARDIOVASCULAR EXAMINATION: ICD-10-CM

## 2025-05-05 LAB
ALBUMIN SERPL BCG-MCNC: 4.2 G/DL (ref 3.5–5)
ALP SERPL-CCNC: 85 U/L (ref 34–104)
ALT SERPL W P-5'-P-CCNC: 44 U/L (ref 7–52)
ANION GAP SERPL CALCULATED.3IONS-SCNC: 9 MMOL/L (ref 4–13)
AST SERPL W P-5'-P-CCNC: 25 U/L (ref 13–39)
BILIRUB SERPL-MCNC: 0.5 MG/DL (ref 0.2–1)
BUN SERPL-MCNC: 9 MG/DL (ref 5–25)
CALCIUM SERPL-MCNC: 9.5 MG/DL (ref 8.4–10.2)
CHLORIDE SERPL-SCNC: 101 MMOL/L (ref 96–108)
CO2 SERPL-SCNC: 31 MMOL/L (ref 21–32)
CREAT SERPL-MCNC: 0.61 MG/DL (ref 0.6–1.3)
GFR SERPL CREATININE-BSD FRML MDRD: 106 ML/MIN/1.73SQ M
GLUCOSE P FAST SERPL-MCNC: 80 MG/DL (ref 65–99)
POTASSIUM SERPL-SCNC: 3.8 MMOL/L (ref 3.5–5.3)
PROT SERPL-MCNC: 6.4 G/DL (ref 6.4–8.4)
SODIUM SERPL-SCNC: 141 MMOL/L (ref 135–147)

## 2025-05-05 PROCEDURE — 36415 COLL VENOUS BLD VENIPUNCTURE: CPT

## 2025-05-05 PROCEDURE — 71046 X-RAY EXAM CHEST 2 VIEWS: CPT

## 2025-05-05 PROCEDURE — 80053 COMPREHEN METABOLIC PANEL: CPT

## 2025-05-09 ENCOUNTER — OFFICE VISIT (OUTPATIENT)
Dept: LAB | Facility: HOSPITAL | Age: 50
End: 2025-05-09
Payer: COMMERCIAL

## 2025-05-09 DIAGNOSIS — Z01.810 PRE-OPERATIVE CARDIOVASCULAR EXAMINATION: ICD-10-CM

## 2025-05-09 LAB
ATRIAL RATE: 0 BPM
P AXIS: 0 DEGREES
QRS AXIS: 0 DEGREES
QRSD INTERVAL: 0 MS
QT INTERVAL: 0 MS
QTC INTERVAL: 0 MS
T WAVE AXIS: 0 DEGREES
VENTRICULAR RATE: 0 BPM

## 2025-05-09 PROCEDURE — 93005 ELECTROCARDIOGRAM TRACING: CPT

## 2025-05-09 PROCEDURE — 93010 ELECTROCARDIOGRAM REPORT: CPT | Performed by: INTERNAL MEDICINE

## 2025-05-12 ENCOUNTER — TELEPHONE (OUTPATIENT)
Age: 50
End: 2025-05-12

## 2025-05-12 NOTE — TELEPHONE ENCOUNTER
I thought she stopped it due to low potassium and has been taking metoprolol instead. Can you please confirm with pt?

## 2025-05-12 NOTE — TELEPHONE ENCOUNTER
Patient called stating she currently takes Hydrochlorothiazide 25 mg daily but was told at the pharmacy that the prescription has been cancelled. Pt states she is completely out of the medication and asking for a refill to [facility] [place].  Medication not on med list.   Please advise

## 2025-05-13 LAB
ATRIAL RATE: 61 BPM
P AXIS: 52 DEGREES
PR INTERVAL: 154 MS
QRS AXIS: 62 DEGREES
QRSD INTERVAL: 78 MS
QT INTERVAL: 426 MS
QTC INTERVAL: 430 MS
T WAVE AXIS: 56 DEGREES
VENTRICULAR RATE: 61 BPM

## 2025-05-13 PROCEDURE — 93010 ELECTROCARDIOGRAM REPORT: CPT | Performed by: INTERNAL MEDICINE

## 2025-05-13 NOTE — TELEPHONE ENCOUNTER
Patient calling up follow up on medication request of Hydrochlorothiazide. Patient was relayed with provider's response to confirm. Patient states she has been taking both, the metoprolol and the hydrochlorothiazide because her blood pressure is better control with both along with a potassium supplement. Please review.

## 2025-05-14 ENCOUNTER — CONSULT (OUTPATIENT)
Dept: FAMILY MEDICINE CLINIC | Facility: CLINIC | Age: 50
End: 2025-05-14
Payer: COMMERCIAL

## 2025-05-14 VITALS
SYSTOLIC BLOOD PRESSURE: 126 MMHG | DIASTOLIC BLOOD PRESSURE: 70 MMHG | TEMPERATURE: 98.3 F | BODY MASS INDEX: 27 KG/M2 | HEIGHT: 67 IN | WEIGHT: 172 LBS | HEART RATE: 78 BPM | RESPIRATION RATE: 18 BRPM | OXYGEN SATURATION: 99 %

## 2025-05-14 DIAGNOSIS — I10 ESSENTIAL HYPERTENSION: ICD-10-CM

## 2025-05-14 DIAGNOSIS — E53.8 B12 DEFICIENCY: ICD-10-CM

## 2025-05-14 DIAGNOSIS — Z98.84 HISTORY OF ROUX-EN-Y GASTRIC BYPASS: ICD-10-CM

## 2025-05-14 DIAGNOSIS — R73.03 PREDIABETES: ICD-10-CM

## 2025-05-14 DIAGNOSIS — G47.33 OSA (OBSTRUCTIVE SLEEP APNEA): ICD-10-CM

## 2025-05-14 DIAGNOSIS — Z01.818 PRE-OP EVALUATION: Primary | ICD-10-CM

## 2025-05-14 DIAGNOSIS — E66.813 CLASS 3 SEVERE OBESITY DUE TO EXCESS CALORIES WITH SERIOUS COMORBIDITY AND BODY MASS INDEX (BMI) OF 40.0 TO 44.9 IN ADULT: ICD-10-CM

## 2025-05-14 PROCEDURE — 99214 OFFICE O/P EST MOD 30 MIN: CPT | Performed by: INTERNAL MEDICINE

## 2025-05-14 RX ORDER — HYDROCHLOROTHIAZIDE 25 MG/1
25 TABLET ORAL DAILY
Qty: 90 TABLET | Refills: 1 | Status: SHIPPED | OUTPATIENT
Start: 2025-05-14

## 2025-05-14 NOTE — PROGRESS NOTES
Pre-operative Clearance  Name: Ting Vasques      : 1975      MRN: [id number]  Encounter Provider: Rosa Blankenship MD  Encounter Date: 2025   Encounter department: [facility] PRIMARY [facility]ARE    :  Assessment & Plan  Pre-op evaluation  Low risk for complication, okay to proceed as planned. Recommend holding Zepbound for 1 week, HCTZ day of surgery        Essential hypertension    Orders:  •  hydroCHLOROthiazide 25 mg tablet; Take 1 tablet (25 mg total) by mouth daily    History of Toni-en-Y gastric bypass    Orders:  •  Tirzepatide-Weight Management (Zepbound) 10 mg/0.5 mL subcutaneous solution (vial); Inject 0.5 mL (10 mg total) under the skin once a week    Prediabetes    Orders:  •  Tirzepatide-Weight Management (Zepbound) 10 mg/0.5 mL subcutaneous solution (vial); Inject 0.5 mL (10 mg total) under the skin once a week    B12 deficiency         Class 3 severe obesity due to excess calories with serious comorbidity and body mass index (BMI) of 40.0 to 44.9 in adult      Orders:  •  Tirzepatide-Weight Management (Zepbound) 10 mg/0.5 mL subcutaneous solution (vial); Inject 0.5 mL (10 mg total) under the skin once a week    CHRISTIANO (obstructive sleep apnea)    Orders:  •  Tirzepatide-Weight Management (Zepbound) 10 mg/0.5 mL subcutaneous solution (vial); Inject 0.5 mL (10 mg total) under the skin once a week        Pre-operative Clearance:     Clearance:  Patient is medically optimized (CLEARED) for proposed surgery without any additional cardiac testing.      Medication Instructions:   - Avoid herbs or non-directed vitamins one week prior to surgery    - Avoid aspirin containing medications or non-steroidal anti-inflammatory drugs one week preceding surgery    - 5HT3 Antagonist (ie, zofran):  Continue to take this medication on your normal schedule.   - Antidepressants: Continue to take this medication on your normal schedule.  - Beta blockers:  Continue to take this medication on your normal  schedule.  - Buspirone: Continue to take this medication on your normal schedule.  - Diuretics: Continue taking this medication up to the evening before surgery/procedure, but do not take in the morning of the day of surgery/procedure.  - GLP weight loss meds: Stop medication 1 week prior to procedure/surgery. If undergoing bariatric surgery, then stop medication 4 weeks prior.  - H2 Blocker: Continue to take this medication on your normal schedule.  - Leukotriene Inhibitor (ie, montelukast, zafirlucast): Continue to take this medication on your normal schedule.       History of Present Illness     Pre-Op Examination       Pre-operative Risk Factors:    - History of cerebrovascular disease: No    - History of ischemic heart disease: No    - History of congestive heart failure: No    - Pre-operative treatment with insulin: No    - Pre-operative creatinine >2 mg/dL: No      Review of Systems   Constitutional:  Negative for appetite change, chills, fatigue and fever.   Respiratory:  Negative for cough, chest tightness and shortness of breath.    Cardiovascular:  Positive for leg swelling. Negative for chest pain.   Gastrointestinal:  Negative for abdominal pain, constipation, diarrhea, nausea and vomiting.   Musculoskeletal:  Positive for arthralgias.   Skin:  Negative for rash.   Neurological:  Negative for dizziness, light-headedness and headaches.     Past Medical History   Past Medical History:   Diagnosis Date   • Anemia 2011   • Chronic back pain     secondary to spinal stenosis   • Endometrial thickening on ultrasound    • Environmental allergies    • GERD (gastroesophageal reflux disease)    • Hypertension    • IUD (intrauterine device) in place     Mirena in place at this time per pt   • Migraine    • Obesity 1975   • Prediabetes    • Spinal stenosis      Past Surgical History:   Procedure Laterality Date   • BARIATRIC SURGERY  2011   • CHOLECYSTECTOMY     • COLONOSCOPY     • GASTRIC BYPASS  2011   • MASS  EXCISION Left 9/1/2023    Procedure: EXCISION BIOPSY TISSUE LESION/MASS LOWER EXTREMITY/thigh;  Surgeon: Quentin Dhaliwal MD;  Location: [facility];  Service: General   • HI HYSTEROSCOPY BX ENDOMETRIUM&/POLYPC W/WO D&C N/A 1/18/2024    Procedure: HYSTEROSCOPY, (D&C) , POLYPECTOMY, PSB  MIRENA INSERTION;  Surgeon: Portia Barbosa MD;  Location: [facility];  Service: Gynecology   • HI LAPS TOTAL HYSTERECT 250 GM/< W/RMVL TUBE/OVARY N/A 3/22/2024    Procedure: (LTH) W/ ROBOTICS, BILATERAL SALPINGO-OOPHORECTOMY ;  (EUA);  Surgeon: Virginia Rincon MD;  Location: [facility];  Service: Gynecology Oncology   • US GUIDED THYROID BIOPSY  5/30/2024     Family History   Problem Relation Age of Onset   • Diabetes Mother    • Hypertension Mother    • Endometrial cancer Mother 74   • Cancer Mother         Endometrial   • Diabetes Father    • Hypertension Father    • Heart disease Father    • Prostate cancer Father    • Cancer Father         Prostate   • Hypertension Sister    • No Known Problems Sister    • Hypertension Maternal Grandmother    • Heart disease Maternal Grandmother    • Diabetes Maternal Grandmother    • No Known Problems Maternal Grandfather    • Breast cancer additional onset Paternal Grandmother 70   • Breast cancer Paternal Grandmother 66   • Cancer Paternal Grandmother         Laryngeal and breast   • Prostate cancer Paternal Grandfather    • Bone cancer Paternal Grandfather    • Cancer Paternal Grandfather         Prostate   • Breast cancer Paternal Aunt 68   • Anesthesia problems Neg Hx      Social History     Tobacco Use   • Smoking status: Never   • Smokeless tobacco: Never   • Tobacco comments:     Never a smoker or use of any tobacco products per pt    Vaping Use   • Vaping status: Never Used   Substance and Sexual Activity   • Alcohol use: Yes     Comment: socially - couple times per year   • Drug use: No     Comment: Denies any drug use per pt   • Sexual activity: Not Currently     Birth  "control/protection: Abstinence     Comment: Not active at this time per pt     Medications[1]  Allergies   Allergen Reactions   • Bee Venom Hives   • Pollen Extract Allergic Rhinitis     Objective   /70   Pulse 78   Temp 98.3 °F (36.8 °C) (Temporal)   Resp 18   Ht 5' 7\" (1.702 m)   Wt 78 kg (172 lb)   LMP 03/14/2024 Comment: per pt has MIRENA in place  SpO2 99%   BMI 26.94 kg/m²     Physical Exam  Vitals reviewed.   Constitutional:       General: She is not in acute distress.     Appearance: She is normal weight.     Cardiovascular:      Rate and Rhythm: Normal rate and regular rhythm.      Heart sounds: No murmur heard.     No friction rub. No gallop.   Pulmonary:      Effort: Pulmonary effort is normal. No respiratory distress.      Breath sounds: No wheezing, rhonchi or rales.   Abdominal:      General: Abdomen is flat. There is no distension.      Palpations: Abdomen is soft.      Tenderness: There is no abdominal tenderness. There is no guarding.     Neurological:      General: No focal deficit present.      Mental Status: She is alert.     Psychiatric:         Mood and Affect: Mood normal.         Behavior: Behavior normal.           Rosa Blankenship MD         [1]  Current Outpatient Medications on File Prior to Visit   Medication Sig   • acetaminophen (TYLENOL) 500 mg tablet Take 500 mg by mouth every 6 (six) hours as needed for mild pain   • ascorbic acid (VITAMIN C) 250 mg tablet Take 1 tablet (250 mg total) by mouth daily With FeSO4   • azelastine (ASTELIN) 0.1 % nasal spray 2 sprays into each nostril 2 (two) times a day Use in each nostril as directed   • busPIRone (BUSPAR) 10 mg tablet Take 1 tablet (10 mg total) by mouth 2 (two) times a day   • clobetasol (TEMOVATE) 0.05 % cream Apply topically 2 (two) times a day   • DULoxetine (CYMBALTA) 60 mg delayed release capsule Take 1 capsule (60 mg total) by mouth 2 (two) times a day   • famotidine (PEPCID) 40 MG tablet Take 1 tablet (40 mg " total) by mouth daily at bedtime as needed for indigestion or heartburn   • folic acid (FOLVITE) 400 mcg tablet Take 1 tablet (400 mcg total) by mouth daily   • metoprolol succinate (TOPROL-XL) 50 mg 24 hr tablet Take 1 tablet (50 mg total) by mouth daily at bedtime   • mupirocin (BACTROBAN) 2 % ointment Apply topically 3 (three) times a day   • omeprazole (PriLOSEC) 40 MG capsule Take 1 capsule (40 mg total) by mouth daily   • ondansetron (ZOFRAN) 8 mg tablet Take 1 tablet (8 mg total) by mouth every 8 (eight) hours as needed for nausea or vomiting   • potassium chloride (Klor-Con M20) 20 mEq tablet Take 2 tablets (40 mEq total) by mouth 2 (two) times a day for 7 days   • [DISCONTINUED] tirzepatide (Zepbound) 10 mg/0.5 mL auto-injector Inject 0.5 mL (10 mg total) under the skin once a week   • clotrimazole-betamethasone (LOTRISONE) 1-0.05 % cream Apply topically 2 (two) times a day for 7 days   • cyanocobalamin 1,000 mcg/mL Inject 1 mL (1,000 mcg total) into a muscle once a week for 4 doses (Patient not taking: Reported on 3/12/2025)   • montelukast (SINGULAIR) 10 mg tablet Take 1 tablet (10 mg total) by mouth daily at bedtime (Patient not taking: Reported on 3/12/2025)   • Syringe 1 ML MISC Use every 30 (thirty) days (Patient not taking: Reported on 3/12/2025)   • triamcinolone (KENALOG) 0.1 % ointment Apply topically 2 (two) times a day (Patient not taking: Reported on 5/14/2025)   • [DISCONTINUED] metFORMIN (GLUCOPHAGE) 500 mg tablet Take 1 tablet (500 mg total) by mouth daily with dinner

## 2025-05-14 NOTE — ASSESSMENT & PLAN NOTE
Orders:  •  Tirzepatide-Weight Management (Zepbound) 10 mg/0.5 mL subcutaneous solution (vial); Inject 0.5 mL (10 mg total) under the skin once a week

## 2025-05-15 RX ORDER — TIRZEPATIDE 10 MG/.5ML
10 INJECTION, SOLUTION SUBCUTANEOUS WEEKLY
Qty: 2 ML | Refills: 0 | Status: SHIPPED | OUTPATIENT
Start: 2025-05-15

## 2025-06-03 DIAGNOSIS — F41.9 ANXIETY AND DEPRESSION: ICD-10-CM

## 2025-06-03 DIAGNOSIS — F32.A ANXIETY AND DEPRESSION: ICD-10-CM

## 2025-06-03 RX ORDER — BUSPIRONE HYDROCHLORIDE 10 MG/1
10 TABLET ORAL 2 TIMES DAILY
Qty: 60 TABLET | Refills: 3 | Status: SHIPPED | OUTPATIENT
Start: 2025-06-03

## 2025-06-03 NOTE — TELEPHONE ENCOUNTER
Patient requesting refill(s) of: Buspirone 10 mg     Last filled: 2/11/25  Last appt: 4/3/25  Next appt: 10/6/25  Pharmacy: [facility] [place]

## 2025-07-08 ENCOUNTER — PATIENT MESSAGE (OUTPATIENT)
Dept: FAMILY MEDICINE CLINIC | Facility: CLINIC | Age: 50
End: 2025-07-08

## 2025-07-09 ENCOUNTER — NURSE TRIAGE (OUTPATIENT)
Age: 50
End: 2025-07-09

## 2025-07-09 NOTE — TELEPHONE ENCOUNTER
Patient returned call, she stated she has not had any recent tick bite or rash that she is aware of also denies any fever. She did mention she has a stiff neck occasionally. She would like the script for the Imitrex sent to the [facility] in [place].     Also attempted to schedule appt for the patient she asked if she could be a virtual visit for the follow up, she is in training for a new job and is unable to come in to the office.     Please advise, thank you

## 2025-07-09 NOTE — TELEPHONE ENCOUNTER
You can offer my same-day Monday or Tuesday next week. I can send some Imitrex to try in the interim if it feels like a migraine. Any recent tick bite, fever, rash or neck stiffness?

## 2025-07-09 NOTE — TELEPHONE ENCOUNTER
Regarding: Headaches  ----- Message from Chana RUELAS sent at 7/9/2025 12:03 PM EDT -----  Ting Vasques to  Primary Care [facility] (supporting Rosa Blankenship MD) (Selected Message)        7/8/25 11:17 AM  Hi,  I am getting a lot of headaches. They are lasting almost all day despite taking advil, Tylenol, excedrin and do cause nausea at times. Lately they have been daily.  I used to get migraines and took meds but It was a long time ago.  Is there something else I can take? The headaches are generally in the top and front and sometimes in the back.      Also I was told I had Chiari malformation. Could that be causing headaches?      Thanks,  Ting

## 2025-07-09 NOTE — TELEPHONE ENCOUNTER
"REASON FOR CONVERSATION: Headache    SYMPTOMS: intermittent on going headache for the past month. Generally in the top, front and sometimes in the back of the head. Nausea when headache is stronger. States that it goes away when she sleeps but then it starts in the morning and the severity decreases a bit with otc tylenol, advil or excedrin.    OTHER HEALTH INFORMATION: patient was contacted due to [employer] message. Patient states getting seasonal allergies and take daily zyrtec and xyzal nose spray. Patient states that she used to get migraines and took Imitrex but It was a long time ago like 10 years ago.  she was also told that she had Chiari malformation.     PROTOCOL DISPOSITION: Discuss With PCP and Callback by Nurse Today (overriding See Within 3 Days in Office)    CARE ADVICE PROVIDED: based on symptoms CTS tried to get patient for an appointment but next available with Dr. Blankenship was next Wednesday and patient stated that she just started a new job and is currently in orientation.  States that it would have to be a later appointment or a virtual if possible. Pt advised to increase her water intake to make sure she is well hydrated, to apply cold pack when headache starts, to continue with otc tylenol, excedrin as directed and to call back with worsening symptoms.    PRACTICE FOLLOW-UP: please review and follow up with further advise.           Reason for Disposition   MILD - MODERATE headache present > 3 days (72 hours)    Answer Assessment - Initial Assessment Questions  1. LOCATION: \"Where does it hurt?\"       The headaches are generally in the top, front and sometimes in the back.   2. ONSET: \"When did the headache start?\" (e.g., minutes, hours, days)       1 month ago   3. PATTERN: \"Does the pain come and go, or has it been constant since it started?\"      Come and go. States that they go away when she sleeps  4. SEVERITY: \"How bad is the pain?\" and \"What does it keep you from doing?\"  (e.g., Scale 1-10; " "mild, moderate, or severe)      Headache can vary but it can be 5-6/10   5. RECURRENT SYMPTOM: \"Have you ever had headaches before?\" If Yes, ask: \"When was the last time?\" and \"What happened that time?\"       Per pt she used to get migraines and took medication but It was a long time ago.  6. CAUSE: \"What do you think is causing the headache?\"      Unsure   7. MIGRAINE: \"Have you been diagnosed with migraine headaches?\" If Yes, ask: \"Is this headache similar?\"       Around 10 years ago she used to get migraines and she believes she took Imitrex   8. HEAD INJURY: \"Has there been any recent injury to the head?\"       Denies   9. OTHER SYMPTOMS: \"Do you have any other symptoms?\" (e.g., fever, stiff neck, eye pain, sore throat, cold symptoms)      Sometimes nausea with the severity of the headache    Protocols used: Headache-Adult-[place]    "

## 2025-07-14 ENCOUNTER — TELEMEDICINE (OUTPATIENT)
Dept: FAMILY MEDICINE CLINIC | Facility: CLINIC | Age: 50
End: 2025-07-14
Payer: COMMERCIAL

## 2025-07-14 DIAGNOSIS — R51.9 ACUTE NONINTRACTABLE HEADACHE, UNSPECIFIED HEADACHE TYPE: Primary | ICD-10-CM

## 2025-07-14 DIAGNOSIS — E53.8 B12 DEFICIENCY: ICD-10-CM

## 2025-07-14 DIAGNOSIS — G93.5 CHIARI I MALFORMATION (HCC): ICD-10-CM

## 2025-07-14 DIAGNOSIS — Z86.69 HISTORY OF MIGRAINE: ICD-10-CM

## 2025-07-14 DIAGNOSIS — G43.909 MIGRAINE WITHOUT STATUS MIGRAINOSUS, NOT INTRACTABLE, UNSPECIFIED MIGRAINE TYPE: Primary | ICD-10-CM

## 2025-07-14 PROCEDURE — 99214 OFFICE O/P EST MOD 30 MIN: CPT | Performed by: INTERNAL MEDICINE

## 2025-07-14 RX ORDER — SUMATRIPTAN 50 MG/1
50 TABLET, FILM COATED ORAL ONCE AS NEEDED
Qty: 10 TABLET | Refills: 0 | Status: SHIPPED | OUTPATIENT
Start: 2025-07-14

## 2025-07-14 NOTE — PATIENT INSTRUCTIONS
Please get blood work done and brain MRI  Try adding magnesium 200mg daily, along with B complex vitamin

## 2025-07-14 NOTE — PROGRESS NOTES
Virtual Regular Visit  Name: Ting Vasques      : 1975      MRN: [id number]  Encounter Provider: Rosa Blankenship MD  Encounter Date: 2025   Encounter department: [facility]  :  Assessment & Plan  Acute nonintractable headache, unspecified headache type  New onset headaches, associated vision changes and nausea. Atypical from prior migraines. Discussed lyme Ab, and brain MRI given history of Chiari malformation     Orders:  •  Lyme Total AB W Reflex to IGM/IGG; Future  •  MRI brain wo contrast; Future    Chiari I malformation (HCC)    Orders:  •  Lyme Total AB W Reflex to IGM/IGG; Future  •  MRI brain wo contrast; Future    History of migraine    Orders:  •  Lyme Total AB W Reflex to IGM/IGG; Future  •  MRI brain wo contrast; Future    B12 deficiency    Orders:  •  Vitamin B12; Future        History of Present Illness     48yo female with history of HTN, pre-diabetes, RNY bypass here for acute visit due to headaches.     Started about 2 months ago, but becoming more frequent. Mostly frontal and parietal and bilateral. Using Excedrin migraine.     Using zyrtec, azelastine, sudafed prn. Denies sinus pain, pressure.     Associated with nausea, photophobia and dropping things (both hands). Denies diplopia, aura.     History of migraines 15 years ago. Previously had photophobia and nausea with those.       Review of Systems   Constitutional:  Negative for appetite change, chills, fatigue and fever.   HENT:  Negative for congestion, postnasal drip, rhinorrhea and sinus pressure.    Eyes:  Positive for visual disturbance.   Musculoskeletal:  Positive for neck pain. Negative for arthralgias.   Skin:  Negative for rash.   Neurological:  Positive for weakness and headaches. Negative for dizziness and light-headedness.       Objective   LMP 2024 Comment: per pt has MIRENA in place    Physical Exam  Vitals reviewed.   Constitutional:       General: She is not in acute distress.      Appearance: She is normal weight.   Pulmonary:      Effort: No respiratory distress.     Neurological:      General: No focal deficit present.      Mental Status: She is alert.      Cranial Nerves: No cranial nerve deficit.     Psychiatric:         Mood and Affect: Mood normal.         Behavior: Behavior normal.         Administrative Statements   Encounter provider Rosa Blankenship MD    The Patient is located at Other and in the following state in which I hold an active license [place].    The patient was identified by name and date of birth. Ting Vasques was informed that this is a telemedicine visit and that the visit is being conducted through the Epic Embedded platform. She agrees to proceed..  My office door was closed. No one else was in the room.  She acknowledged consent and understanding of privacy and security of the video platform. The patient has agreed to participate and understands they can discontinue the visit at any time.    I have spent a total time of 25 minutes in caring for this patient on the day of the visit/encounter including Risks and benefits of tx options, Instructions for management, Impressions, Documenting in the medical record, and Obtaining or reviewing history  , not including the time spent for establishing the audio/video connection.

## 2025-07-15 ENCOUNTER — VBI (OUTPATIENT)
Dept: ADMINISTRATIVE | Facility: OTHER | Age: 50
End: 2025-07-15

## 2025-07-16 ENCOUNTER — TELEPHONE (OUTPATIENT)
Dept: GYNECOLOGIC ONCOLOGY | Facility: CLINIC | Age: 50
End: 2025-07-16

## 2025-07-16 NOTE — TELEPHONE ENCOUNTER
Called and left a message that appointment was rescheduled.  Appointment was rescheduled to 9/5 at 2:30 pm in [place] with Dr. Rincon.  Patient needs to confirm.

## 2025-07-25 ENCOUNTER — NURSE TRIAGE (OUTPATIENT)
Age: 50
End: 2025-07-25

## 2025-07-25 ENCOUNTER — TELEPHONE (OUTPATIENT)
Age: 50
End: 2025-07-25
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
## 2025-07-25 DIAGNOSIS — S30.860A TICK BITE OF PELVIC REGION, INITIAL ENCOUNTER: Primary | ICD-10-CM

## 2025-07-25 RX ORDER — DOXYCYCLINE 100 MG/1
200 CAPSULE ORAL ONCE
Qty: 2 CAPSULE | Refills: 0 | Status: SHIPPED | OUTPATIENT
Start: 2025-07-25 | End: 2025-07-25

## 2025-07-25 NOTE — TELEPHONE ENCOUNTER
Please see [employer]ivo[employer] message with attached picture. Unable to reach pt    Reason for Disposition   Third attempt to contact caller AND no contact made. Phone number verified.    Protocols used: No Contact or Duplicate Contact Call-Adult-[place]

## 2025-07-25 NOTE — TELEPHONE ENCOUNTER
Patient called back and agrees to 1x dose of Doxycycline to be sent to [facility] in [place].  Patient states that she doesn't have any known allergies to meds.     Patient states she removed the tick Wednesday night and unsure how long it was attached for and that it was engorged. Patient has been having headaches and joint pain and it seems like it has worsen. Pt had virtual visit with Dr. Blankenship on 7/14 and Lyme test order. A Warm transfer to the office was made to clarify if she should wait 2-3 weeks as lyme test was order on 7/14 due to headaches. Rita advise to wait at least 2 weeks. Advised to call back with further questions or worsening symptoms.

## 2025-07-25 NOTE — TELEPHONE ENCOUNTER
Regarding: tick bite  ----- Message from Cristel MILLER sent at 7/25/2025  9:18 AM EDT -----  My Chart message   Hi Dr. Blankenship,     I found a tick in a weird spot, but now it has a ring around it. I am attaching a picture.   Attachments

## 2025-07-25 NOTE — TELEPHONE ENCOUNTER
Patient called,    Returning call    Relayed message per providers message below:    SHARMIN Tellez to ROSARIO Gordillo Counts include 234 beds at the [facility]       7/25/25 10:46 AM  Note      Lyme won't be positive for 2-3 weeks. She can have 200mg Doxycycline 1 time dose if she thinks it was a deer tick- also ask about allergies        Patient verbalized understanding  Patient is in agreeable with one dose of doxycycline  No allergies.    Warm transfer to nurse for more information.    Message complete    Please advise, thank you.

## 2025-07-25 NOTE — TELEPHONE ENCOUNTER
Lyme won't be positive for 2-3 weeks. She can have 200mg Doxycycline 1 time dose if she thinks it was a deer tick- also ask about allergies

## 2025-08-01 DIAGNOSIS — R10.12 LEFT UPPER QUADRANT ABDOMINAL PAIN: ICD-10-CM

## 2025-08-01 RX ORDER — ONDANSETRON 8 MG/1
8 TABLET, FILM COATED ORAL EVERY 8 HOURS PRN
Qty: 20 TABLET | Refills: 3 | Status: SHIPPED | OUTPATIENT
Start: 2025-08-01

## 2025-08-04 DIAGNOSIS — E66.813 CLASS 3 SEVERE OBESITY DUE TO EXCESS CALORIES WITH SERIOUS COMORBIDITY AND BODY MASS INDEX (BMI) OF 40.0 TO 44.9 IN ADULT: Primary | ICD-10-CM

## 2025-08-04 RX ORDER — TIRZEPATIDE 12.5 MG/.5ML
12.5 INJECTION, SOLUTION SUBCUTANEOUS WEEKLY
Qty: 2 ML | Refills: 0 | Status: SHIPPED | OUTPATIENT
Start: 2025-08-04

## 2025-08-06 ENCOUNTER — TELEPHONE (OUTPATIENT)
Age: 50
End: 2025-08-06

## 2025-08-06 ENCOUNTER — PATIENT MESSAGE (OUTPATIENT)
Dept: FAMILY MEDICINE CLINIC | Facility: CLINIC | Age: 50
End: 2025-08-06

## 2025-08-07 ENCOUNTER — TELEPHONE (OUTPATIENT)
Dept: GYNECOLOGIC ONCOLOGY | Facility: CLINIC | Age: 50
End: 2025-08-07

## 2025-08-11 ENCOUNTER — CLINICAL SUPPORT (OUTPATIENT)
Dept: FAMILY MEDICINE CLINIC | Facility: CLINIC | Age: 50
End: 2025-08-11
Payer: COMMERCIAL

## (undated) DEVICE — TISSUE REMOVAL SYSTEM FLUID MANAGEMENT ACCESSORIES: Brand: SYMPHION

## (undated) DEVICE — TISSUE REMOVAL SYSTEM RESECTING DEVICE: Brand: SYMPHION

## (undated) DEVICE — KIT, BETHLEHEM ROBOTIC PROST: Brand: CARDINAL HEALTH

## (undated) DEVICE — PREMIUM DRY TRAY LF: Brand: MEDLINE INDUSTRIES, INC.

## (undated) DEVICE — SUT ETHILON 3-0 FS-1 18 IN 663G

## (undated) DEVICE — TIP COVER ACCESSORY

## (undated) DEVICE — CYSTO TUBING SINGLE IRRIGATION

## (undated) DEVICE — 40595 XL TRENDELENBURG POSITIONING KIT: Brand: 40595 XL TRENDELENBURG POSITIONING KIT

## (undated) DEVICE — SYNCHROSEAL: Brand: DA VINCI ENERGY

## (undated) DEVICE — COLUMN DRAPE

## (undated) DEVICE — ENDOPATH PNEUMONEEDLE INSUFFLATION NEEDLES WITH LUER LOCK CONNECTORS 120MM: Brand: ENDOPATH

## (undated) DEVICE — GLOVE SRG LF STRL BGL SKNSNS 6.5 PF

## (undated) DEVICE — ELECTRO LUBE IS A SINGLE PATIENT USE DEVICE THAT IS INTENDED TO BE USED ON ELECTROSURGICAL ELECTRODES TO REDUCE STICKING.: Brand: KEY SURGICAL ELECTRO LUBE

## (undated) DEVICE — IV EXTENSION TUBING 33 IN

## (undated) DEVICE — SUT MONOCRYL 4-0 PS-2 27 IN Y426H

## (undated) DEVICE — SYRINGE 50ML LL

## (undated) DEVICE — GLOVE INDICATOR PI UNDERGLOVE SZ 6.5 BLUE

## (undated) DEVICE — SCD SEQUENTIAL COMPRESSION COMFORT SLEEVE MEDIUM KNEE LENGTH: Brand: KENDALL SCD

## (undated) DEVICE — SUT STRATAFIX SPIRAL 2-0 CT-1 30 CM SXPP1B410

## (undated) DEVICE — CHLORAPREP HI-LITE 26ML ORANGE

## (undated) DEVICE — LARGE SUTURE CUT NEEDLE DRIVER: Brand: ENDOWRIST

## (undated) DEVICE — DRAPE EQUIPMENT RF WAND

## (undated) DEVICE — PENROSE DRAIN, 18 X 3 8: Brand: CARDINAL HEALTH

## (undated) DEVICE — PENCIL ELECTROSURG E-Z CLEAN -0035H

## (undated) DEVICE — GLOVE PI ULTRA TOUCH SZ.6.5

## (undated) DEVICE — TROCAR: Brand: KII SLEEVE

## (undated) DEVICE — NEEDLE 25G X 1 1/2

## (undated) DEVICE — AIRSEAL TUBE SMOKE EVAC LUMENX3 FILTERED

## (undated) DEVICE — INTENDED FOR TISSUE SEPARATION, AND OTHER PROCEDURES THAT REQUIRE A SHARP SURGICAL BLADE TO PUNCTURE OR CUT.: Brand: BARD-PARKER SAFETY BLADES SIZE 11, STERILE

## (undated) DEVICE — SYRINGE 30ML LL

## (undated) DEVICE — 2000CC GUARDIAN II: Brand: GUARDIAN

## (undated) DEVICE — NEEDLE BLUNT 18 G X 1 1/2IN

## (undated) DEVICE — UNDER BUTTOCKS DRAPE W/FLUID CONTROL POUCH: Brand: CONVERTORS

## (undated) DEVICE — SUT VICRYL 0 UR-6 27 IN J603H

## (undated) DEVICE — 3000CC GUARDIAN II: Brand: GUARDIAN

## (undated) DEVICE — MONOPOLAR CURVED SCISSORS: Brand: ENDOWRIST

## (undated) DEVICE — TRAY FOLEY 16FR URIMETER SILICONE SURESTEP

## (undated) DEVICE — METZENBAUM ADTEC SINGLE USE DISSECTING SCISSORS, SHAFT ONLY, MONOPOLAR, CURVED TO LEFT, WORKING LENGTH: 12 1/4", (310 MM), DIAM. 5 MM, INSULATED, DOUBLE ACTION, STERILE, DISPOSABLE, PACKAGE OF 10 PIECES: Brand: AESCULAP

## (undated) DEVICE — CANNULA SEAL

## (undated) DEVICE — STRL ALLENTOWN HYSTEROSCOPY PK: Brand: CARDINAL HEALTH

## (undated) DEVICE — TROCAR PORT ACCESS 8 X100MML W BLDLS OPTICAL TIP AIRSEAL

## (undated) DEVICE — VISUALIZATION SYSTEM: Brand: CLEARIFY

## (undated) DEVICE — BETHLEHEM UNIVERSAL  MIONR EXT: Brand: CARDINAL HEALTH

## (undated) DEVICE — TROCAR: Brand: KII FIOS FIRST ENTRY

## (undated) DEVICE — OCCLUDER COLPO-PNEUMO

## (undated) DEVICE — PROGRASP FORCEPS: Brand: ENDOWRIST

## (undated) DEVICE — PVC URETHRAL CATHETER: Brand: DOVER

## (undated) DEVICE — UTERINE MANIPULATOR RUMI 6.7 X 8 CM

## (undated) DEVICE — ABDOMINAL PAD: Brand: DERMACEA

## (undated) DEVICE — INTENDED FOR TISSUE SEPARATION, AND OTHER PROCEDURES THAT REQUIRE A SHARP SURGICAL BLADE TO PUNCTURE OR CUT.: Brand: BARD-PARKER ® SAFETYLOCK CARBON RIB-BACK BLADES

## (undated) DEVICE — BLADELESS OBTURATOR: Brand: WECK VISTA

## (undated) DEVICE — NEEDLE COUNTER LG W/RULER

## (undated) DEVICE — KERLIX BANDAGE ROLL: Brand: KERLIX

## (undated) DEVICE — Device: Brand: TISSUE RETRIEVAL SYSTEM

## (undated) DEVICE — ARM DRAPE

## (undated) DEVICE — ADHESIVE SKIN HIGH VISCOSITY EXOFIN 1ML